# Patient Record
Sex: MALE | Race: WHITE | NOT HISPANIC OR LATINO | Employment: PART TIME | URBAN - NONMETROPOLITAN AREA
[De-identification: names, ages, dates, MRNs, and addresses within clinical notes are randomized per-mention and may not be internally consistent; named-entity substitution may affect disease eponyms.]

---

## 2021-11-28 ENCOUNTER — APPOINTMENT (OUTPATIENT)
Dept: GENERAL RADIOLOGY | Facility: HOSPITAL | Age: 41
End: 2021-11-28

## 2021-11-28 ENCOUNTER — HOSPITAL ENCOUNTER (EMERGENCY)
Facility: HOSPITAL | Age: 41
Discharge: HOME | End: 2021-11-28
Attending: EMERGENCY MEDICINE | Admitting: EMERGENCY MEDICINE

## 2021-11-28 VITALS
SYSTOLIC BLOOD PRESSURE: 134 MMHG | OXYGEN SATURATION: 97 % | TEMPERATURE: 98.1 F | HEART RATE: 78 BPM | RESPIRATION RATE: 20 BRPM | DIASTOLIC BLOOD PRESSURE: 66 MMHG | WEIGHT: 294 LBS | HEIGHT: 75 IN | BODY MASS INDEX: 36.56 KG/M2

## 2021-11-28 DIAGNOSIS — R05.9 COUGH: Primary | ICD-10-CM

## 2021-11-28 DIAGNOSIS — J40 BRONCHITIS: ICD-10-CM

## 2021-11-28 LAB
ANION GAP SERPL CALCULATED.3IONS-SCNC: 13 MMOL/L (ref 5–15)
ARTERIAL PATENCY WRIST A: POSITIVE
ATMOSPHERIC PRESS: 750 MMHG
BASE EXCESS BLDA CALC-SCNC: 2.3 MMOL/L (ref 0–2)
BASOPHILS # BLD AUTO: 0.04 10*3/MM3 (ref 0–0.2)
BASOPHILS NFR BLD AUTO: 0.3 % (ref 0–1.5)
BDY SITE: ABNORMAL
BODY TEMPERATURE: 37 C
BUN SERPL-MCNC: 17 MG/DL (ref 6–20)
BUN/CREAT SERPL: 23 (ref 7–25)
CALCIUM SPEC-SCNC: 9 MG/DL (ref 8.6–10.5)
CHLORIDE SERPL-SCNC: 105 MMOL/L (ref 98–107)
CO2 SERPL-SCNC: 23 MMOL/L (ref 22–29)
CREAT SERPL-MCNC: 0.74 MG/DL (ref 0.76–1.27)
D DIMER PPP FEU-MCNC: <0.22 MG/L (FEU) (ref 0–0.5)
DEPRECATED RDW RBC AUTO: 43.1 FL (ref 37–54)
EOSINOPHIL # BLD AUTO: 0.24 10*3/MM3 (ref 0–0.4)
EOSINOPHIL NFR BLD AUTO: 1.8 % (ref 0.3–6.2)
ERYTHROCYTE [DISTWIDTH] IN BLOOD BY AUTOMATED COUNT: 13.5 % (ref 12.3–15.4)
GFR SERPL CREATININE-BSD FRML MDRD: 117 ML/MIN/1.73
GLUCOSE SERPL-MCNC: 132 MG/DL (ref 65–99)
HCO3 BLDA-SCNC: 27 MMOL/L (ref 20–26)
HCT VFR BLD AUTO: 48.5 % (ref 37.5–51)
HGB BLD-MCNC: 16.2 G/DL (ref 13–17.7)
IMM GRANULOCYTES # BLD AUTO: 0.06 10*3/MM3 (ref 0–0.05)
IMM GRANULOCYTES NFR BLD AUTO: 0.5 % (ref 0–0.5)
LYMPHOCYTES # BLD AUTO: 3.4 10*3/MM3 (ref 0.7–3.1)
LYMPHOCYTES NFR BLD AUTO: 25.6 % (ref 19.6–45.3)
Lab: ABNORMAL
MCH RBC QN AUTO: 29.2 PG (ref 26.6–33)
MCHC RBC AUTO-ENTMCNC: 33.4 G/DL (ref 31.5–35.7)
MCV RBC AUTO: 87.5 FL (ref 79–97)
MODALITY: ABNORMAL
MONOCYTES # BLD AUTO: 1.2 10*3/MM3 (ref 0.1–0.9)
MONOCYTES NFR BLD AUTO: 9 % (ref 5–12)
NEUTROPHILS NFR BLD AUTO: 62.8 % (ref 42.7–76)
NEUTROPHILS NFR BLD AUTO: 8.33 10*3/MM3 (ref 1.7–7)
NRBC BLD AUTO-RTO: 0 /100 WBC (ref 0–0.2)
NT-PROBNP SERPL-MCNC: 22.3 PG/ML (ref 0–450)
PCO2 BLDA: 41 MM HG (ref 35–45)
PCO2 TEMP ADJ BLD: 41 MM HG (ref 35–45)
PH BLDA: 7.43 PH UNITS (ref 7.35–7.45)
PH, TEMP CORRECTED: 7.43 PH UNITS (ref 7.35–7.45)
PLATELET # BLD AUTO: 168 10*3/MM3 (ref 140–450)
PMV BLD AUTO: 13.3 FL (ref 6–12)
PO2 BLDA: 86.4 MM HG (ref 83–108)
PO2 TEMP ADJ BLD: 86.4 MM HG (ref 83–108)
POTASSIUM SERPL-SCNC: 4.2 MMOL/L (ref 3.5–5.2)
RBC # BLD AUTO: 5.54 10*6/MM3 (ref 4.14–5.8)
SAO2 % BLDCOA: 97.7 % (ref 94–99)
SARS-COV-2 RNA PNL SPEC NAA+PROBE: NOT DETECTED
SODIUM SERPL-SCNC: 141 MMOL/L (ref 136–145)
TROPONIN T SERPL-MCNC: <0.01 NG/ML (ref 0–0.03)
VENTILATOR MODE: ABNORMAL
WBC NRBC COR # BLD: 13.27 10*3/MM3 (ref 3.4–10.8)

## 2021-11-28 PROCEDURE — 93010 ELECTROCARDIOGRAM REPORT: CPT | Performed by: INTERNAL MEDICINE

## 2021-11-28 PROCEDURE — 83880 ASSAY OF NATRIURETIC PEPTIDE: CPT | Performed by: EMERGENCY MEDICINE

## 2021-11-28 PROCEDURE — 80048 BASIC METABOLIC PNL TOTAL CA: CPT | Performed by: EMERGENCY MEDICINE

## 2021-11-28 PROCEDURE — 82803 BLOOD GASES ANY COMBINATION: CPT

## 2021-11-28 PROCEDURE — 84484 ASSAY OF TROPONIN QUANT: CPT | Performed by: EMERGENCY MEDICINE

## 2021-11-28 PROCEDURE — 71045 X-RAY EXAM CHEST 1 VIEW: CPT

## 2021-11-28 PROCEDURE — 25010000002 METHYLPREDNISOLONE PER 125 MG: Performed by: EMERGENCY MEDICINE

## 2021-11-28 PROCEDURE — 94640 AIRWAY INHALATION TREATMENT: CPT

## 2021-11-28 PROCEDURE — 93005 ELECTROCARDIOGRAM TRACING: CPT | Performed by: EMERGENCY MEDICINE

## 2021-11-28 PROCEDURE — 99283 EMERGENCY DEPT VISIT LOW MDM: CPT

## 2021-11-28 PROCEDURE — 36600 WITHDRAWAL OF ARTERIAL BLOOD: CPT

## 2021-11-28 PROCEDURE — 96374 THER/PROPH/DIAG INJ IV PUSH: CPT

## 2021-11-28 PROCEDURE — 94799 UNLISTED PULMONARY SVC/PX: CPT

## 2021-11-28 PROCEDURE — 87635 SARS-COV-2 COVID-19 AMP PRB: CPT | Performed by: EMERGENCY MEDICINE

## 2021-11-28 PROCEDURE — 85025 COMPLETE CBC W/AUTO DIFF WBC: CPT | Performed by: EMERGENCY MEDICINE

## 2021-11-28 PROCEDURE — 85379 FIBRIN DEGRADATION QUANT: CPT | Performed by: EMERGENCY MEDICINE

## 2021-11-28 RX ORDER — PSEUDOEPHEDRINE HCL 120 MG/1
120 TABLET, FILM COATED, EXTENDED RELEASE ORAL EVERY 12 HOURS
Qty: 14 TABLET | Refills: 0 | Status: ON HOLD | OUTPATIENT
Start: 2021-11-28 | End: 2022-01-15

## 2021-11-28 RX ORDER — METHYLPREDNISOLONE SODIUM SUCCINATE 125 MG/2ML
125 INJECTION, POWDER, LYOPHILIZED, FOR SOLUTION INTRAMUSCULAR; INTRAVENOUS ONCE
Status: COMPLETED | OUTPATIENT
Start: 2021-11-28 | End: 2021-11-28

## 2021-11-28 RX ORDER — PREDNISONE 50 MG/1
50 TABLET ORAL DAILY
Qty: 5 TABLET | Refills: 0 | Status: ON HOLD | OUTPATIENT
Start: 2021-11-28 | End: 2022-01-15

## 2021-11-28 RX ORDER — BENZONATATE 200 MG/1
200 CAPSULE ORAL 3 TIMES DAILY PRN
Qty: 15 CAPSULE | Refills: 0 | Status: ON HOLD | OUTPATIENT
Start: 2021-11-28 | End: 2022-01-15

## 2021-11-28 RX ORDER — IPRATROPIUM BROMIDE AND ALBUTEROL SULFATE 2.5; .5 MG/3ML; MG/3ML
3 SOLUTION RESPIRATORY (INHALATION) ONCE
Status: COMPLETED | OUTPATIENT
Start: 2021-11-28 | End: 2021-11-28

## 2021-11-28 RX ORDER — ALBUTEROL SULFATE 90 UG/1
2 AEROSOL, METERED RESPIRATORY (INHALATION) EVERY 4 HOURS PRN
Qty: 6.7 G | Refills: 0 | Status: ON HOLD | OUTPATIENT
Start: 2021-11-28 | End: 2022-01-15

## 2021-11-28 RX ADMIN — IPRATROPIUM BROMIDE AND ALBUTEROL SULFATE 3 ML: 2.5; .5 SOLUTION RESPIRATORY (INHALATION) at 01:01

## 2021-11-28 RX ADMIN — METHYLPREDNISOLONE SODIUM SUCCINATE 125 MG: 125 INJECTION, POWDER, FOR SOLUTION INTRAMUSCULAR; INTRAVENOUS at 00:55

## 2021-11-29 LAB
QT INTERVAL: 344 MS
QTC INTERVAL: 416 MS

## 2022-01-14 ENCOUNTER — APPOINTMENT (OUTPATIENT)
Dept: GENERAL RADIOLOGY | Facility: HOSPITAL | Age: 42
End: 2022-01-14

## 2022-01-14 ENCOUNTER — HOSPITAL ENCOUNTER (INPATIENT)
Facility: HOSPITAL | Age: 42
LOS: 1 days | Discharge: HOME | End: 2022-01-16
Attending: INTERNAL MEDICINE

## 2022-01-14 DIAGNOSIS — R09.02 HYPOXIA: ICD-10-CM

## 2022-01-14 DIAGNOSIS — U07.1 COVID-19: Primary | ICD-10-CM

## 2022-01-14 LAB
ALBUMIN SERPL-MCNC: 4.4 G/DL (ref 3.5–5.2)
ALBUMIN/GLOB SERPL: 1.6 G/DL
ALP SERPL-CCNC: 49 U/L (ref 39–117)
ALT SERPL W P-5'-P-CCNC: 39 U/L (ref 1–41)
ANION GAP SERPL CALCULATED.3IONS-SCNC: 11 MMOL/L (ref 5–15)
ARTERIAL PATENCY WRIST A: ABNORMAL
AST SERPL-CCNC: 33 U/L (ref 1–40)
ATMOSPHERIC PRESS: 752 MMHG
BASE EXCESS BLDA CALC-SCNC: 1.9 MMOL/L (ref 0–2)
BASOPHILS # BLD AUTO: 0.03 10*3/MM3 (ref 0–0.2)
BASOPHILS NFR BLD AUTO: 0.3 % (ref 0–1.5)
BDY SITE: ABNORMAL
BILIRUB SERPL-MCNC: 0.5 MG/DL (ref 0–1.2)
BODY TEMPERATURE: 37 C
BUN SERPL-MCNC: 18 MG/DL (ref 6–20)
BUN/CREAT SERPL: 16.4 (ref 7–25)
CALCIUM SPEC-SCNC: 8.6 MG/DL (ref 8.6–10.5)
CHLORIDE SERPL-SCNC: 103 MMOL/L (ref 98–107)
CO2 SERPL-SCNC: 24 MMOL/L (ref 22–29)
CREAT SERPL-MCNC: 1.1 MG/DL (ref 0.76–1.27)
CRP SERPL-MCNC: 0.54 MG/DL (ref 0–0.5)
D DIMER PPP FEU-MCNC: 0.32 MG/L (FEU) (ref 0–0.5)
D-LACTATE SERPL-SCNC: 1.7 MMOL/L (ref 0.5–2)
DEPRECATED RDW RBC AUTO: 43 FL (ref 37–54)
EOSINOPHIL # BLD AUTO: 0.04 10*3/MM3 (ref 0–0.4)
EOSINOPHIL NFR BLD AUTO: 0.4 % (ref 0.3–6.2)
ERYTHROCYTE [DISTWIDTH] IN BLOOD BY AUTOMATED COUNT: 13.3 % (ref 12.3–15.4)
GFR SERPL CREATININE-BSD FRML MDRD: 74 ML/MIN/1.73
GLOBULIN UR ELPH-MCNC: 2.7 GM/DL
GLUCOSE SERPL-MCNC: 117 MG/DL (ref 65–99)
HCO3 BLDA-SCNC: 25.2 MMOL/L (ref 20–26)
HCT VFR BLD AUTO: 47.3 % (ref 37.5–51)
HGB BLD-MCNC: 15.6 G/DL (ref 13–17.7)
IMM GRANULOCYTES # BLD AUTO: 0.08 10*3/MM3 (ref 0–0.05)
IMM GRANULOCYTES NFR BLD AUTO: 0.7 % (ref 0–0.5)
LDH SERPL-CCNC: 273 U/L (ref 135–225)
LYMPHOCYTES # BLD AUTO: 0.32 10*3/MM3 (ref 0.7–3.1)
LYMPHOCYTES NFR BLD AUTO: 2.8 % (ref 19.6–45.3)
Lab: ABNORMAL
MCH RBC QN AUTO: 28.9 PG (ref 26.6–33)
MCHC RBC AUTO-ENTMCNC: 33 G/DL (ref 31.5–35.7)
MCV RBC AUTO: 87.8 FL (ref 79–97)
MODALITY: ABNORMAL
MONOCYTES # BLD AUTO: 1.61 10*3/MM3 (ref 0.1–0.9)
MONOCYTES NFR BLD AUTO: 14.2 % (ref 5–12)
NEUTROPHILS NFR BLD AUTO: 81.6 % (ref 42.7–76)
NEUTROPHILS NFR BLD AUTO: 9.23 10*3/MM3 (ref 1.7–7)
NRBC BLD AUTO-RTO: 0 /100 WBC (ref 0–0.2)
PCO2 BLDA: 35.1 MM HG (ref 35–45)
PCO2 TEMP ADJ BLD: 35.1 MM HG (ref 35–45)
PH BLDA: 7.46 PH UNITS (ref 7.35–7.45)
PH, TEMP CORRECTED: 7.46 PH UNITS (ref 7.35–7.45)
PLATELET # BLD AUTO: 140 10*3/MM3 (ref 140–450)
PMV BLD AUTO: 13.1 FL (ref 6–12)
PO2 BLDA: 63.3 MM HG (ref 83–108)
PO2 TEMP ADJ BLD: 63.3 MM HG (ref 83–108)
POTASSIUM SERPL-SCNC: 4.3 MMOL/L (ref 3.5–5.2)
PROCALCITONIN SERPL-MCNC: 0.14 NG/ML (ref 0–0.25)
PROT SERPL-MCNC: 7.1 G/DL (ref 6–8.5)
RBC # BLD AUTO: 5.39 10*6/MM3 (ref 4.14–5.8)
SAO2 % BLDCOA: 94.8 % (ref 94–99)
SARS-COV-2 RNA PNL SPEC NAA+PROBE: DETECTED
SODIUM SERPL-SCNC: 138 MMOL/L (ref 136–145)
VENTILATOR MODE: ABNORMAL
WBC NRBC COR # BLD: 11.31 10*3/MM3 (ref 3.4–10.8)

## 2022-01-14 PROCEDURE — 87635 SARS-COV-2 COVID-19 AMP PRB: CPT | Performed by: EMERGENCY MEDICINE

## 2022-01-14 PROCEDURE — 36600 WITHDRAWAL OF ARTERIAL BLOOD: CPT

## 2022-01-14 PROCEDURE — 86140 C-REACTIVE PROTEIN: CPT | Performed by: PHYSICIAN ASSISTANT

## 2022-01-14 PROCEDURE — 85025 COMPLETE CBC W/AUTO DIFF WBC: CPT | Performed by: PHYSICIAN ASSISTANT

## 2022-01-14 PROCEDURE — 93005 ELECTROCARDIOGRAM TRACING: CPT | Performed by: INTERNAL MEDICINE

## 2022-01-14 PROCEDURE — 84145 PROCALCITONIN (PCT): CPT | Performed by: PHYSICIAN ASSISTANT

## 2022-01-14 PROCEDURE — 25010000002 ONDANSETRON PER 1 MG: Performed by: PHYSICIAN ASSISTANT

## 2022-01-14 PROCEDURE — 85379 FIBRIN DEGRADATION QUANT: CPT | Performed by: PHYSICIAN ASSISTANT

## 2022-01-14 PROCEDURE — 83615 LACTATE (LD) (LDH) ENZYME: CPT | Performed by: PHYSICIAN ASSISTANT

## 2022-01-14 PROCEDURE — 82803 BLOOD GASES ANY COMBINATION: CPT

## 2022-01-14 PROCEDURE — 80053 COMPREHEN METABOLIC PANEL: CPT | Performed by: PHYSICIAN ASSISTANT

## 2022-01-14 PROCEDURE — 83605 ASSAY OF LACTIC ACID: CPT | Performed by: PHYSICIAN ASSISTANT

## 2022-01-14 PROCEDURE — 71045 X-RAY EXAM CHEST 1 VIEW: CPT

## 2022-01-14 PROCEDURE — 99284 EMERGENCY DEPT VISIT MOD MDM: CPT

## 2022-01-14 PROCEDURE — 25010000002 MAGNESIUM SULFATE 2 GM/50ML SOLUTION: Performed by: PHYSICIAN ASSISTANT

## 2022-01-14 PROCEDURE — 93010 ELECTROCARDIOGRAM REPORT: CPT | Performed by: INTERNAL MEDICINE

## 2022-01-14 PROCEDURE — 25010000002 DIPHENHYDRAMINE PER 50 MG: Performed by: PHYSICIAN ASSISTANT

## 2022-01-14 PROCEDURE — 93005 ELECTROCARDIOGRAM TRACING: CPT

## 2022-01-14 RX ORDER — SODIUM CHLORIDE 0.9 % (FLUSH) 0.9 %
10 SYRINGE (ML) INJECTION AS NEEDED
Status: DISCONTINUED | OUTPATIENT
Start: 2022-01-14 | End: 2022-01-16 | Stop reason: HOSPADM

## 2022-01-14 RX ORDER — MAGNESIUM SULFATE HEPTAHYDRATE 40 MG/ML
2 INJECTION, SOLUTION INTRAVENOUS ONCE
Status: COMPLETED | OUTPATIENT
Start: 2022-01-14 | End: 2022-01-15

## 2022-01-14 RX ORDER — ACETAMINOPHEN 500 MG
1000 TABLET ORAL ONCE
Status: COMPLETED | OUTPATIENT
Start: 2022-01-14 | End: 2022-01-14

## 2022-01-14 RX ORDER — DIPHENHYDRAMINE HYDROCHLORIDE 50 MG/ML
25 INJECTION INTRAMUSCULAR; INTRAVENOUS ONCE
Status: COMPLETED | OUTPATIENT
Start: 2022-01-14 | End: 2022-01-14

## 2022-01-14 RX ORDER — ONDANSETRON 2 MG/ML
4 INJECTION INTRAMUSCULAR; INTRAVENOUS ONCE
Status: COMPLETED | OUTPATIENT
Start: 2022-01-14 | End: 2022-01-14

## 2022-01-14 RX ADMIN — DIPHENHYDRAMINE HYDROCHLORIDE 25 MG: 50 INJECTION, SOLUTION INTRAMUSCULAR; INTRAVENOUS at 21:58

## 2022-01-14 RX ADMIN — MAGNESIUM SULFATE HEPTAHYDRATE 2 G: 2 INJECTION, SOLUTION INTRAVENOUS at 21:58

## 2022-01-14 RX ADMIN — ACETAMINOPHEN 1000 MG: 500 TABLET, FILM COATED ORAL at 23:12

## 2022-01-14 RX ADMIN — ONDANSETRON 4 MG: 2 INJECTION INTRAMUSCULAR; INTRAVENOUS at 21:58

## 2022-01-14 RX ADMIN — SODIUM CHLORIDE, POTASSIUM CHLORIDE, SODIUM LACTATE AND CALCIUM CHLORIDE 1000 ML: 600; 310; 30; 20 INJECTION, SOLUTION INTRAVENOUS at 21:56

## 2022-01-15 PROBLEM — E66.9 OBESITY (BMI 30-39.9): Status: ACTIVE | Noted: 2022-01-15

## 2022-01-15 PROBLEM — R09.02 HYPOXEMIA: Status: ACTIVE | Noted: 2022-01-15

## 2022-01-15 PROBLEM — F17.200 TOBACCO DEPENDENCE: Status: ACTIVE | Noted: 2022-01-15

## 2022-01-15 LAB
ALBUMIN SERPL-MCNC: 4.2 G/DL (ref 3.5–5.2)
ALBUMIN/GLOB SERPL: 1.8 G/DL
ALP SERPL-CCNC: 46 U/L (ref 39–117)
ALT SERPL W P-5'-P-CCNC: 35 U/L (ref 1–41)
ANION GAP SERPL CALCULATED.3IONS-SCNC: 8 MMOL/L (ref 5–15)
AST SERPL-CCNC: 29 U/L (ref 1–40)
BASOPHILS # BLD AUTO: 0.02 10*3/MM3 (ref 0–0.2)
BASOPHILS NFR BLD AUTO: 0.3 % (ref 0–1.5)
BILIRUB SERPL-MCNC: 0.5 MG/DL (ref 0–1.2)
BUN SERPL-MCNC: 17 MG/DL (ref 6–20)
BUN/CREAT SERPL: 17.5 (ref 7–25)
CALCIUM SPEC-SCNC: 8.3 MG/DL (ref 8.6–10.5)
CHLORIDE SERPL-SCNC: 103 MMOL/L (ref 98–107)
CO2 SERPL-SCNC: 27 MMOL/L (ref 22–29)
CREAT SERPL-MCNC: 0.97 MG/DL (ref 0.76–1.27)
DEPRECATED RDW RBC AUTO: 43.5 FL (ref 37–54)
EOSINOPHIL # BLD AUTO: 0.02 10*3/MM3 (ref 0–0.4)
EOSINOPHIL NFR BLD AUTO: 0.3 % (ref 0.3–6.2)
ERYTHROCYTE [DISTWIDTH] IN BLOOD BY AUTOMATED COUNT: 13.3 % (ref 12.3–15.4)
FERRITIN SERPL-MCNC: 199.5 NG/ML (ref 30–400)
GFR SERPL CREATININE-BSD FRML MDRD: 85 ML/MIN/1.73
GLOBULIN UR ELPH-MCNC: 2.4 GM/DL
GLUCOSE SERPL-MCNC: 99 MG/DL (ref 65–99)
HCT VFR BLD AUTO: 45.3 % (ref 37.5–51)
HGB BLD-MCNC: 15.3 G/DL (ref 13–17.7)
IMM GRANULOCYTES # BLD AUTO: 0.04 10*3/MM3 (ref 0–0.05)
IMM GRANULOCYTES NFR BLD AUTO: 0.5 % (ref 0–0.5)
LYMPHOCYTES # BLD AUTO: 0.5 10*3/MM3 (ref 0.7–3.1)
LYMPHOCYTES NFR BLD AUTO: 6.6 % (ref 19.6–45.3)
MCH RBC QN AUTO: 29.8 PG (ref 26.6–33)
MCHC RBC AUTO-ENTMCNC: 33.8 G/DL (ref 31.5–35.7)
MCV RBC AUTO: 88.3 FL (ref 79–97)
MONOCYTES # BLD AUTO: 1.45 10*3/MM3 (ref 0.1–0.9)
MONOCYTES NFR BLD AUTO: 19.1 % (ref 5–12)
NEUTROPHILS NFR BLD AUTO: 5.57 10*3/MM3 (ref 1.7–7)
NEUTROPHILS NFR BLD AUTO: 73.2 % (ref 42.7–76)
NRBC BLD AUTO-RTO: 0 /100 WBC (ref 0–0.2)
PLATELET # BLD AUTO: 125 10*3/MM3 (ref 140–450)
PMV BLD AUTO: 13.3 FL (ref 6–12)
POTASSIUM SERPL-SCNC: 4.2 MMOL/L (ref 3.5–5.2)
PROT SERPL-MCNC: 6.6 G/DL (ref 6–8.5)
RBC # BLD AUTO: 5.13 10*6/MM3 (ref 4.14–5.8)
SODIUM SERPL-SCNC: 138 MMOL/L (ref 136–145)
TROPONIN T SERPL-MCNC: <0.01 NG/ML (ref 0–0.03)
WBC NRBC COR # BLD: 7.6 10*3/MM3 (ref 3.4–10.8)

## 2022-01-15 PROCEDURE — 84484 ASSAY OF TROPONIN QUANT: CPT | Performed by: INTERNAL MEDICINE

## 2022-01-15 PROCEDURE — 25010000002 ENOXAPARIN PER 10 MG: Performed by: INTERNAL MEDICINE

## 2022-01-15 PROCEDURE — 85025 COMPLETE CBC W/AUTO DIFF WBC: CPT | Performed by: INTERNAL MEDICINE

## 2022-01-15 PROCEDURE — 82728 ASSAY OF FERRITIN: CPT | Performed by: INTERNAL MEDICINE

## 2022-01-15 PROCEDURE — 94799 UNLISTED PULMONARY SVC/PX: CPT

## 2022-01-15 PROCEDURE — 25010000002 HYDROMORPHONE PER 4 MG: Performed by: INTERNAL MEDICINE

## 2022-01-15 PROCEDURE — 63710000001 DEXAMETHASONE PER 0.25 MG: Performed by: INTERNAL MEDICINE

## 2022-01-15 PROCEDURE — 94640 AIRWAY INHALATION TREATMENT: CPT

## 2022-01-15 PROCEDURE — 80053 COMPREHEN METABOLIC PANEL: CPT | Performed by: INTERNAL MEDICINE

## 2022-01-15 RX ORDER — HYDROMORPHONE HYDROCHLORIDE 1 MG/ML
0.5 INJECTION, SOLUTION INTRAMUSCULAR; INTRAVENOUS; SUBCUTANEOUS EVERY 4 HOURS PRN
Status: DISCONTINUED | OUTPATIENT
Start: 2022-01-15 | End: 2022-01-16 | Stop reason: HOSPADM

## 2022-01-15 RX ORDER — ALBUTEROL SULFATE 90 UG/1
2 AEROSOL, METERED RESPIRATORY (INHALATION)
Status: DISCONTINUED | OUTPATIENT
Start: 2022-01-15 | End: 2022-01-16 | Stop reason: HOSPADM

## 2022-01-15 RX ORDER — ZINC SULFATE 50(220)MG
220 CAPSULE ORAL DAILY
Status: DISCONTINUED | OUTPATIENT
Start: 2022-01-15 | End: 2022-01-16 | Stop reason: HOSPADM

## 2022-01-15 RX ORDER — ASCORBIC ACID 500 MG
500 TABLET ORAL DAILY
Status: DISCONTINUED | OUTPATIENT
Start: 2022-01-15 | End: 2022-01-16 | Stop reason: HOSPADM

## 2022-01-15 RX ORDER — ACETAMINOPHEN 325 MG/1
650 TABLET ORAL EVERY 6 HOURS PRN
Status: DISCONTINUED | OUTPATIENT
Start: 2022-01-15 | End: 2022-01-15

## 2022-01-15 RX ORDER — FAMOTIDINE 20 MG/1
20 TABLET, FILM COATED ORAL
Status: DISCONTINUED | OUTPATIENT
Start: 2022-01-15 | End: 2022-01-16 | Stop reason: HOSPADM

## 2022-01-15 RX ORDER — ONDANSETRON 2 MG/ML
4 INJECTION INTRAMUSCULAR; INTRAVENOUS EVERY 6 HOURS PRN
Status: DISCONTINUED | OUTPATIENT
Start: 2022-01-15 | End: 2022-01-16 | Stop reason: HOSPADM

## 2022-01-15 RX ORDER — TRAZODONE HYDROCHLORIDE 100 MG/1
100 TABLET ORAL NIGHTLY
Status: DISCONTINUED | OUTPATIENT
Start: 2022-01-15 | End: 2022-01-16 | Stop reason: HOSPADM

## 2022-01-15 RX ORDER — FAMOTIDINE 10 MG/ML
20 INJECTION, SOLUTION INTRAVENOUS EVERY 12 HOURS SCHEDULED
Status: DISCONTINUED | OUTPATIENT
Start: 2022-01-15 | End: 2022-01-15

## 2022-01-15 RX ORDER — SODIUM CHLORIDE 0.9 % (FLUSH) 0.9 %
10 SYRINGE (ML) INJECTION EVERY 12 HOURS SCHEDULED
Status: DISCONTINUED | OUTPATIENT
Start: 2022-01-15 | End: 2022-01-16 | Stop reason: HOSPADM

## 2022-01-15 RX ORDER — ACETAMINOPHEN 325 MG/1
650 TABLET ORAL EVERY 6 HOURS PRN
Status: DISCONTINUED | OUTPATIENT
Start: 2022-01-15 | End: 2022-01-16 | Stop reason: HOSPADM

## 2022-01-15 RX ORDER — ACETAMINOPHEN 160 MG/5ML
650 SOLUTION ORAL EVERY 4 HOURS PRN
Status: DISCONTINUED | OUTPATIENT
Start: 2022-01-15 | End: 2022-01-16 | Stop reason: HOSPADM

## 2022-01-15 RX ORDER — DICLOFENAC SODIUM 75 MG/1
75 TABLET, DELAYED RELEASE ORAL 2 TIMES DAILY
COMMUNITY

## 2022-01-15 RX ORDER — TIZANIDINE 4 MG/1
4 TABLET ORAL 3 TIMES DAILY PRN
COMMUNITY
End: 2022-08-31

## 2022-01-15 RX ORDER — TRAZODONE HYDROCHLORIDE 100 MG/1
100 TABLET ORAL NIGHTLY
COMMUNITY

## 2022-01-15 RX ORDER — SODIUM CHLORIDE 0.9 % (FLUSH) 0.9 %
10 SYRINGE (ML) INJECTION AS NEEDED
Status: DISCONTINUED | OUTPATIENT
Start: 2022-01-15 | End: 2022-01-16 | Stop reason: HOSPADM

## 2022-01-15 RX ADMIN — FAMOTIDINE 20 MG: 20 TABLET, FILM COATED ORAL at 19:28

## 2022-01-15 RX ADMIN — ACETAMINOPHEN 650 MG: 325 TABLET ORAL at 06:23

## 2022-01-15 RX ADMIN — SODIUM CHLORIDE, PRESERVATIVE FREE 10 ML: 5 INJECTION INTRAVENOUS at 02:15

## 2022-01-15 RX ADMIN — THIAMINE HCL TAB 100 MG 100 MG: 100 TAB at 19:28

## 2022-01-15 RX ADMIN — SODIUM CHLORIDE, PRESERVATIVE FREE 10 ML: 5 INJECTION INTRAVENOUS at 09:51

## 2022-01-15 RX ADMIN — ACETAMINOPHEN 650 MG: 160 SOLUTION ORAL at 14:06

## 2022-01-15 RX ADMIN — ALBUTEROL SULFATE 2 PUFF: 90 AEROSOL, METERED RESPIRATORY (INHALATION) at 07:13

## 2022-01-15 RX ADMIN — ENOXAPARIN SODIUM 40 MG: 40 INJECTION SUBCUTANEOUS at 09:51

## 2022-01-15 RX ADMIN — HYDROMORPHONE HYDROCHLORIDE 0.5 MG: 1 INJECTION, SOLUTION INTRAMUSCULAR; INTRAVENOUS; SUBCUTANEOUS at 05:53

## 2022-01-15 RX ADMIN — ZINC SULFATE 220 MG (50 MG) CAPSULE 220 MG: CAPSULE at 09:52

## 2022-01-15 RX ADMIN — SODIUM CHLORIDE, PRESERVATIVE FREE 10 ML: 5 INJECTION INTRAVENOUS at 19:29

## 2022-01-15 RX ADMIN — OXYCODONE HYDROCHLORIDE AND ACETAMINOPHEN 500 MG: 500 TABLET ORAL at 09:51

## 2022-01-15 RX ADMIN — ALBUTEROL SULFATE 2 PUFF: 90 AEROSOL, METERED RESPIRATORY (INHALATION) at 20:35

## 2022-01-15 RX ADMIN — ALBUTEROL SULFATE 2 PUFF: 90 AEROSOL, METERED RESPIRATORY (INHALATION) at 15:00

## 2022-01-15 RX ADMIN — HYDROMORPHONE HYDROCHLORIDE 0.5 MG: 1 INJECTION, SOLUTION INTRAMUSCULAR; INTRAVENOUS; SUBCUTANEOUS at 19:29

## 2022-01-15 RX ADMIN — TRAZODONE HYDROCHLORIDE 100 MG: 100 TABLET ORAL at 19:28

## 2022-01-15 RX ADMIN — FAMOTIDINE 20 MG: 10 INJECTION INTRAVENOUS at 09:58

## 2022-01-15 RX ADMIN — DEXAMETHASONE 6 MG: 4 TABLET ORAL at 09:51

## 2022-01-15 NOTE — PROGRESS NOTES
"Pharmacy Dosing Service  Anticoagulant  Enoxaparin    Assessment/Action/Plan:  Lovenox 30 mg SQ every 24 hours for VTE prophylaxis has been adjusted to 40 mg SQ every 24 hours for patient with CrCl > 30 ml/min. Pharmacy will continue to monitor and adjust dose accordingly.       Subjective:  Miguel Willis is a 41 y.o. male on Enoxaparin 40 mg SQ every 24 hours for indication of VTE prophylaxis.  Objective:  [Ht: 193 cm (76\"); Wt: (!) 139 kg (306 lb); BMI: Body mass index is 37.25 kg/m².]  Estimated Creatinine Clearance: 135 mL/min (by C-G formula based on SCr of 1.1 mg/dL).   Lab Results   Component Value Date    DDIMER 0.32 01/14/2022    No results found for: INR, PROTIME   Lab Results   Component Value Date    HGB 15.6 01/14/2022      Lab Results   Component Value Date     01/14/2022       Aman Akins, PharmD  01/15/22 01:53 CST     "

## 2022-01-15 NOTE — PLAN OF CARE
Problem: Adult Inpatient Plan of Care  Goal: Plan of Care Review  Outcome: Ongoing, Progressing  Flowsheets (Taken 1/15/2022 0644)  Progress: no change  Plan of Care Reviewed With: patient  Outcome Summary: A&Ox4. Temp of 100.9 this am, Tylenol given. Tachypneic. Tachycardic. MD notified. C/o severe headache pain, prn dilaudid given with some relief. No c/o SOB. Covid precautions initiated. Pt expresses concern over how long he will be here. 2L NC, sats running low to mid 90s. Tachy on tele. Call light in reach. Safety maintained.

## 2022-01-15 NOTE — ED PROVIDER NOTES
Subjective   History of Present Illness    Patient is an otherwise healthy 41-year-old male presenting to ED with concern for COVID.  Patient states that at 11 AM he had a sudden onset of a severe generalized headache, severe body aching, as well as generalized weakness and a nonproductive cough.  Patient reports throughout the day has had subjective fevers where he will break out in a sweat and be followed by chills.  Patient reports he has tried no medications and his symptoms have continued to worsen which time he presents for further evaluation.  Patient states that 2 weeks ago his stepdaughter did have COVID-19 however he denies any other known recent sick contact.  Patient does work with the public.  Patient describes some nausea but denies any emesis, abdominal pain, or diarrhea.    Patient received his COVID vaccinations but not a booster.    Records reviewed show patient last seen in the ED on 11/28/2021 for cough and bronchitis.    Review of Systems   Constitutional: Positive for chills, diaphoresis and fever (Subjective).   HENT: Positive for congestion. Negative for sore throat.    Eyes: Negative.    Respiratory: Positive for cough. Negative for chest tightness and shortness of breath.    Cardiovascular: Negative.    Gastrointestinal: Positive for nausea. Negative for abdominal pain, diarrhea and vomiting.   Genitourinary: Negative.    Musculoskeletal: Positive for myalgias.   Skin: Negative.    Neurological: Positive for headaches.   Psychiatric/Behavioral: Negative.    All other systems reviewed and are negative.      History reviewed. No pertinent past medical history.    No Known Allergies    Past Surgical History:   Procedure Laterality Date   • BACK SURGERY         History reviewed. No pertinent family history.    Social History     Socioeconomic History   • Marital status: Legally    Tobacco Use   • Smoking status: Current Every Day Smoker     Packs/day: 1.00     Types: Cigarettes   •  Smokeless tobacco: Never Used           Objective   Physical Exam  Vitals and nursing note reviewed.   Constitutional:       General: He is in acute distress (appears uncomfortable).      Appearance: Normal appearance. He is well-developed, well-groomed and overweight. He is ill-appearing. He is not toxic-appearing or diaphoretic.   HENT:      Head: Normocephalic.      Mouth/Throat:      Mouth: Mucous membranes are moist.      Pharynx: Oropharynx is clear. Posterior oropharyngeal erythema present.   Eyes:      Extraocular Movements: Extraocular movements intact.      Conjunctiva/sclera: Conjunctivae normal.      Pupils: Pupils are equal, round, and reactive to light.   Cardiovascular:      Rate and Rhythm: Tachycardia present.   Pulmonary:      Effort: Pulmonary effort is normal.      Breath sounds: Normal breath sounds.   Abdominal:      General: Bowel sounds are normal.      Palpations: Abdomen is soft.      Tenderness: There is no abdominal tenderness.   Musculoskeletal:         General: Normal range of motion.      Cervical back: Normal range of motion and neck supple.      Right lower leg: No edema.      Left lower leg: No edema.   Skin:     General: Skin is warm and dry.      Findings: No rash.   Neurological:      Mental Status: He is alert and oriented to person, place, and time.      Gait: Gait normal.   Psychiatric:         Mood and Affect: Mood normal.         Behavior: Behavior normal. Behavior is cooperative.         Procedures           ED Course                                                 MDM  Number of Diagnoses or Management Options     Amount and/or Complexity of Data Reviewed  Clinical lab tests: ordered and reviewed  Tests in the radiology section of CPT®: reviewed and ordered  Tests in the medicine section of CPT®: reviewed and ordered  Decide to obtain previous medical records or to obtain history from someone other than the patient: yes  Review and summarize past medical records:  yes  Discuss the patient with other providers: yes (Dr. Aj De Jesus (attending)  Dr. Landrum (hospitalist))    Patient Progress  Patient progress: stable      Patient is an otherwise healthy 41-year-old male presenting to ED with concern for COVID.  CBC with leukocytosis 11.31, elevated relative neutrophils 81.6%, decreased lymphocytes 2.8%.  CMP unremarkable.  Lactic acid, procalcitonin WNL.  LDH elevated at 273, CRP elevated at 0.54.  D-dimer within normal limits at 0.32.  ABG with alkalotic pH 7.465 and decreased PO2 of 63.3.  Patient is COVID-positive.  Patient's initial hypertensive state and tachycardia were improved with a fluid bolus.  Patient was given Tylenol, Benadryl, Zofran, magnesium and had improvement of his headache.  Patient became tachypneic and began satting at 84% on room air for which she was started on 3 to 4 L of nasal cannula oxygen.  Discussed with patient need for admission for further evaluation and monitoring of his symptoms for which he is amenable with no further questions, concerns, or needs.  Case was discussed with Dr. Figueroa, hospitalist, who will kindly accept patient for admission under her services at this time with no further recommendations.    Final diagnoses:   COVID-19   Hypoxia       ED Disposition  ED Disposition     ED Disposition Condition Comment    Decision to Admit  Level of Care: Med/Surg [1]   Diagnosis: COVID-19 [[id number]]   Admitting Physician: JAZZMINE LANDRUM [[id number]]            No follow-up provider specified.       Medication List      No changes were made to your prescriptions during this visit.          Alberto Dalal PA-C  01/15/22 0009

## 2022-01-15 NOTE — PLAN OF CARE
Goal Outcome Evaluation:  Plan of Care Reviewed With: patient      Patient is alert and oriented x4. VSS, O2 @ 2/m. Infrequent pain. Mild cough. Up ad jose. Tele continue. . Continent Bowel and bladder. YO. Continue to monitor.

## 2022-01-15 NOTE — PROGRESS NOTES
[facility] Medicine Services  INPATIENT PROGRESS NOTE    Patient Name: Miguel Willis  Date of Admission: 1/14/2022  Today's Date: 01/15/22  Length of Stay: 0  Primary Care Physician: Provider, No Known    Subjective   Chief Complaint: follow-up COVID  HPI   Patient states that he is doing okay.  He reports that he began having symptoms on Wednesday night (1/12).  He reports that he got the M[employer] vaccine a number of months ago.  He does smoke about a pack of cigarettes per day.  He reports having aches and pains, particularly in his hips.  His headache is getting better.  He has not noticed any significant dyspnea with exertion today, and states that he can get up and go to the bathroom without any shortness of breath.  No significant cough.  He has had ongoing fever and flulike symptoms.  He states that his primary doctor has arranged for him to get a sleep study on an outpatient basis here in the near future for suspected sleep apnea.    Review of Systems     All pertinent negatives and positives are as above. All other systems have been reviewed and are negative unless otherwise stated.     Objective    Temp:  [99.2 °F (37.3 °C)-100.9 °F (38.3 °C)] 100.7 °F (38.2 °C)  Heart Rate:  [] 91  Resp:  [19-30] 21  BP: (109-154)/() 146/83  Physical Exam  Vitals reviewed.   Constitutional:       Appearance: He is not toxic-appearing.   HENT:      Head: Normocephalic.      Mouth/Throat:      Pharynx: No oropharyngeal exudate.   Eyes:      Pupils: Pupils are equal, round, and reactive to light.   Cardiovascular:      Rate and Rhythm: Normal rate.   Pulmonary:      Effort: Pulmonary effort is normal.      Breath sounds: Normal breath sounds.      Comments: Currently on 2L; I turned him down to 1LNC during my assessment  Abdominal:      Palpations: Abdomen is soft.   Musculoskeletal:         General: No deformity.      Cervical back: Neck supple.   Skin:     General:  Skin is warm.      Capillary Refill: Capillary refill takes less than 2 seconds.   Neurological:      General: No focal deficit present.      Mental Status: He is alert.   Psychiatric:         Mood and Affect: Mood normal.         Results Review:  I have reviewed the labs, radiology results, and diagnostic studies.    Laboratory Data:   Results from last 7 days   Lab Units 01/15/22  0441 01/14/22  2155   WBC 10*3/mm3 7.60 11.31*   HEMOGLOBIN g/dL 15.3 15.6   HEMATOCRIT % 45.3 47.3   PLATELETS 10*3/mm3 125* 140        Results from last 7 days   Lab Units 01/15/22  0441 01/14/22  2155   SODIUM mmol/L 138 138   POTASSIUM mmol/L 4.2 4.3   CHLORIDE mmol/L 103 103   CO2 mmol/L 27.0 24.0   BUN mg/dL 17 18   CREATININE mg/dL 0.97 1.10   CALCIUM mg/dL 8.3* 8.6   BILIRUBIN mg/dL 0.5 0.5   ALK PHOS U/L 46 49   ALT (SGPT) U/L 35 39   AST (SGOT) U/L 29 33   GLUCOSE mg/dL 99 117*       Culture Data:   No results found for: BLOODCX, URINECX, WOUNDCX, MRSACX, RESPCX, STOOLCX    Radiology Data:   Imaging Results (Last 24 Hours)     Procedure Component Value Units Date/Time    XR Chest AP [[id number]] Collected: 01/15/22 0741     Updated: 01/15/22 0748    Narrative:      EXAMINATION: Chest 1 view 1/14/2022     HISTORY: Cough and fever. Covid evaluation.     FINDINGS: Upright frontal projection of the chest is compared to prior  exam 11/28/2021. Lungs are hypoventilated causing accentuation of the  bronchovascular markings and cardiac silhouette. There is a granuloma  within the left lung apex. There is no evidence of lobar pneumonia or  effusion.       Impression:      1.. Expiratory chest. No acute disease.  This report was finalized on 01/15/2022 07:45 by Dr. Jovan Braden MD.          I have reviewed the patient's current medications.     Assessment/Plan     Active Hospital Problems    Diagnosis    • Hypoxemia    • Obesity (BMI 30-39.9)    • Tobacco dependence    • COVID-19      Plan:  1.  Continue treatment with  dexamethasone  2.  I turned patient down from 2 L by nasal cannula to 1 L by nasal cannula during my assessment, and he maintained O2 saturations between 94-98%.  We will see if we can continue to wean further to room air.  3.  Add incentive spirometry  4.  We discussed remdesivir; plan to continue to monitor for any evolving symptoms before instituting this treatment at this time.  I did tell patient that if his symptoms began only a few days ago that the earlier we begin treatment the better.  5.  Albuterol HFA  6.  Vitamin C, Pepcid, zinc sulfate, and thiamine  7.  Lovenox prophylaxis  8.  CRP, procalcitonin, ferritin are currently stable.  Other labs reviewed and also stable.  Chest x-ray reviewed with no acute infiltrates.  9.  Patient states that he received the Mata [employer] Mata vaccine a number of months ago.        Electronically signed by Deshaun Paige MD, 01/15/22, 18:02 CST.

## 2022-01-15 NOTE — ED NOTES
Patient desating to 85% on room air, patient placed on 4L nasal cannula. Alberto henson notified.     Cyndi Bunch RN  01/14/22 7347

## 2022-01-15 NOTE — H&P
[facility] Medicine Services  HISTORY AND PHYSICAL    Date of Admission: 1/14/2022  Primary Care Physician: Provider, No Known    Subjective     Chief Complaint: Headache    History of Present Illness  41-year-old male who presents to the emergency department for headache and cough.  The patient's symptoms developed today.  He complains of sore throat, pounding type of migraine that is diffuse, bilateral ear pain, body aches, and cough.  His cough has been nonproductive.  He has no diarrhea.  He has no abdominal pain.  He has no chest pain.  He did not get COVID vaccinations.        Review of Systems   Headache  ST  Ear pain  Cough    Otherwise complete ROS reviewed and negative except as mentioned in the HPI.    Past Medical History: History reviewed. No pertinent past medical history.  Past Surgical History:  Past Surgical History:   Procedure Laterality Date   • BACK SURGERY       Social History:  reports that he has been smoking cigarettes. He has been smoking about 1.00 pack per day. He has never used smokeless tobacco.    Family History: family history is not on file.      Allergies:  No Known Allergies    Medications:  Prior to Admission medications    Medication Sig Start Date End Date Taking? Authorizing Provider   albuterol sulfate  (90 Base) MCG/ACT inhaler Inhale 2 puffs Every 4 (Four) Hours As Needed for Wheezing or Shortness of Air. 11/28/21   Louis Ramirez MD   benzonatate (TESSALON) 200 MG capsule Take 1 capsule by mouth 3 (Three) Times a Day As Needed for Cough. 11/28/21   Louis Ramirez MD   predniSONE (DELTASONE) 50 MG tablet Take 1 tablet by mouth Daily. 11/28/21   Louis Ramirez MD   pseudoephedrine (Sudafed 12 Hour) 120 MG 12 hr tablet Take 1 tablet by mouth Every 12 (Twelve) Hours. 11/28/21   Louis Ramirez MD     I have utilized all available immediate resources to obtain, update, and review the patient's current  "medications.    Objective     Vital Signs: /92   Pulse 109   Temp 100.1 °F (37.8 °C) (Oral)   Resp (!) 30   Ht 193 cm (76\")   Wt (!) 139 kg (306 lb)   SpO2 95%   BMI 37.25 kg/m²   Physical Exam  Vitals reviewed.   Constitutional:       Appearance: He is ill-appearing.   HENT:      Head: Normocephalic and atraumatic.      Nose: Nose normal.   Eyes:      General: No scleral icterus.     Conjunctiva/sclera: Conjunctivae normal.   Cardiovascular:      Rate and Rhythm: Regular rhythm. Tachycardia present.      Heart sounds: Normal heart sounds.   Pulmonary:      Effort: Pulmonary effort is normal. No respiratory distress.   Abdominal:      General: There is no distension.      Palpations: Abdomen is soft.   Musculoskeletal:         General: No swelling or tenderness.      Cervical back: Normal range of motion and neck supple.   Skin:     General: Skin is warm and dry.   Neurological:      General: No focal deficit present.      Mental Status: He is alert.      Cranial Nerves: No cranial nerve deficit.   Psychiatric:         Mood and Affect: Mood normal.         Behavior: Behavior normal.        Results Reviewed:  Lab Results (last 24 hours)     Procedure Component Value Units Date/Time    Comprehensive Metabolic Panel [[id number]]  (Abnormal) Collected: 01/14/22 2155    Specimen: Blood Updated: 01/14/22 2254     Glucose 117 mg/dL      BUN 18 mg/dL      Creatinine 1.10 mg/dL      Sodium 138 mmol/L      Potassium 4.3 mmol/L      Chloride 103 mmol/L      CO2 24.0 mmol/L      Calcium 8.6 mg/dL      Total Protein 7.1 g/dL      Albumin 4.40 g/dL      ALT (SGPT) 39 U/L      AST (SGOT) 33 U/L      Alkaline Phosphatase 49 U/L      Total Bilirubin 0.5 mg/dL      eGFR Non African Amer 74 mL/min/1.73      Globulin 2.7 gm/dL      A/G Ratio 1.6 g/dL      BUN/Creatinine Ratio 16.4     Anion Gap 11.0 mmol/L     Narrative:      GFR Normal >60  Chronic Kidney Disease <60  Kidney Failure <15      C-reactive Protein [[id number]]  " "(Abnormal) Collected: 01/14/22 2155    Specimen: Blood Updated: 01/14/22 2253     C-Reactive Protein 0.54 mg/dL     Lactate Dehydrogenase [[id number]]  (Abnormal) Collected: 01/14/22 2155    Specimen: Blood Updated: 01/14/22 2252      U/L      Comment: Specimen hemolyzed.  Results may be affected.       Procalcitonin [[id number]]  (Normal) Collected: 01/14/22 2155    Specimen: Blood Updated: 01/14/22 2241     Procalcitonin 0.14 ng/mL     Narrative:      As a Marker for Sepsis (Non-Neonates):     1. <0.5 ng/mL represents a low risk of severe sepsis and/or septic shock.  2. >2 ng/mL represents a high risk of severe sepsis and/or septic shock.    As a Marker for Lower Respiratory Tract Infections that require antibiotic therapy:  PCT on Admission     Antibiotic Therapy             6-12 Hrs later  >0.5                          Strongly Recommended            >0.25 - <0.5             Recommended  0.1 - 0.25                  Discouraged                       Remeasure/reassess PCT  <0.1                         Strongly Discouraged         Remeasure/reassess PCT      As 28 day mortality risk marker: \"Change in Procalcitonin Result\" (>80% or <=80%) if Day 0 (or Day 1) and Day 4 values are available. Refer to http://www.Ezetaps-pct-calculator.com/    Change in PCT <=80 %   A decrease of PCT levels below or equal to 80% defines a positive change in PCT test result representing a higher risk for 28-day all-cause mortality of patients diagnosed with severe sepsis or septic shock.    Change in PCT >80 %   A decrease of PCT levels of more than 80% defines a negative change in PCT result representing a lower risk for 28-day all-cause mortality of patients diagnosed with severe sepsis or septic shock.                Lactic Acid, Plasma [[id number]]  (Normal) Collected: 01/14/22 2155    Specimen: Blood Updated: 01/14/22 2233     Lactate 1.7 mmol/L     D-dimer, Quantitative [[id number]]  (Normal) Collected: 01/14/22 2155    " Specimen: Blood Updated: 01/14/22 2225     D-Dimer, Quantitative 0.32 mg/L (FEU)     Narrative:      Reference Range is 0-0.50 mg/L FEU. However, results <0.50 mg/L FEU tends to rule out DVT or PE. Results >0.50 mg/L FEU are not useful in predicting absence or presence of DVT or PE.      CBC & Differential [[id number]]  (Abnormal) Collected: 01/14/22 2155    Specimen: Blood Updated: 01/14/22 2220    Narrative:      The following orders were created for panel order CBC & Differential.  Procedure                               Abnormality         Status                     ---------                               -----------         ------                     CBC Auto Differential[[id number]]        Abnormal            Final result                 Please view results for these tests on the individual orders.    CBC Auto Differential [[id number]]  (Abnormal) Collected: 01/14/22 2155    Specimen: Blood Updated: 01/14/22 2220     WBC 11.31 10*3/mm3      RBC 5.39 10*6/mm3      Hemoglobin 15.6 g/dL      Hematocrit 47.3 %      MCV 87.8 fL      MCH 28.9 pg      MCHC 33.0 g/dL      RDW 13.3 %      RDW-SD 43.0 fl      MPV 13.1 fL      Platelets 140 10*3/mm3      Neutrophil % 81.6 %      Lymphocyte % 2.8 %      Monocyte % 14.2 %      Eosinophil % 0.4 %      Basophil % 0.3 %      Immature Grans % 0.7 %      Neutrophils, Absolute 9.23 10*3/mm3      Lymphocytes, Absolute 0.32 10*3/mm3      Monocytes, Absolute 1.61 10*3/mm3      Eosinophils, Absolute 0.04 10*3/mm3      Basophils, Absolute 0.03 10*3/mm3      Immature Grans, Absolute 0.08 10*3/mm3      nRBC 0.0 /100 WBC     Blood Gas, Arterial - [[id number]]  (Abnormal) Collected: 01/14/22 2150    Specimen: Arterial Blood Updated: 01/14/22 2158     Site Right Brachial     Carlos's Test N/A     pH, Arterial 7.465 pH units      Comment: 83 Value above reference range        pCO2, Arterial 35.1 mm Hg      pO2, Arterial 63.3 mm Hg      Comment: 84 Value below reference range        HCO3,  Arterial 25.2 mmol/L      Base Excess, Arterial 1.9 mmol/L      O2 Saturation, Arterial 94.8 %      Temperature 37.0 C      Barometric Pressure for Blood Gas 752 mmHg      Modality Room Air     Ventilator Mode NA     Collected by [id number]     Comment: Meter: [id number]     :  [email or web address]        pCO2, Temperature Corrected 35.1 mm Hg      pH, Temp Corrected 7.465 pH Units      pO2, Temperature Corrected 63.3 mm Hg     COVID PRE-OP / PRE-PROCEDURE SCREENING ORDER (NO ISOLATION) - Swab, Nasal Cavity [[id number]]  (Abnormal) Collected: 01/14/22 1824    Specimen: Swab from Nasal Cavity Updated: 01/14/22 1924    Narrative:      The following orders were created for panel order COVID PRE-OP / PRE-PROCEDURE SCREENING ORDER (NO ISOLATION) - Swab, Nasal Cavity.  Procedure                               Abnormality         Status                     ---------                               -----------         ------                     COVID-19,[facility]...[[id number]]  Abnormal            Final result                 Please view results for these tests on the individual orders.    COVID-19,[facility] IN-HOUSE,Nasal Swab No Transport Media 3-4 HR TAT - Swab, Nasal Cavity [[id number]]  (Abnormal) Collected: 01/14/22 1824    Specimen: Swab from Nasal Cavity Updated: 01/14/22 1924     COVID19 Detected    Narrative:      Fact sheet for providers: https://www.fda.gov/media/738437/download     Fact sheet for patients: https://www.fda.gov/media/054215/download    Test performed by PCR.    Consider negative results in combination with clinical observations, patient history, and epidemiological information.        Imaging Results (Last 24 Hours)     Procedure Component Value Units Date/Time    XR Chest AP [[id number]] Resulted: 01/14/22 2201     Updated: 01/14/22 2202        I have personally reviewed and interpreted the radiology studies and ECG obtained at time of admission.     Assessment / Plan     Assessment:   Active  Hospital Problems    Diagnosis    • COVID-19      Impression:  1. COVID-19  2. Hypoxia  3. Headache   4. Mild hyperglycemia    Plan:   1. 02 support  2. Zinc and Vitamin C  3. Pepcid and Decadron  4. Labs in the am  5. PRN albuterol MDI  6. Dilaudid for HA PRN       Code Status/Advanced Care Plan: Full full    The patient's surrogate decision maker is family.     I discussed my findings and recommendations with the patient.    Estimated length of stay is 1 to 2 days.     The patient was seen and examined by me on 1/14/2022 at seen before midnight.    Electronically signed by Jacqueline Landrum DO, 01/15/22, 00:16 CST.

## 2022-01-16 ENCOUNTER — READMISSION MANAGEMENT (OUTPATIENT)
Dept: CALL CENTER | Facility: HOSPITAL | Age: 42
End: 2022-01-16

## 2022-01-16 VITALS
TEMPERATURE: 97.2 F | SYSTOLIC BLOOD PRESSURE: 136 MMHG | OXYGEN SATURATION: 94 % | HEIGHT: 75 IN | BODY MASS INDEX: 38.07 KG/M2 | DIASTOLIC BLOOD PRESSURE: 76 MMHG | WEIGHT: 306.2 LBS | RESPIRATION RATE: 16 BRPM | HEART RATE: 72 BPM

## 2022-01-16 PROCEDURE — 25010000002 HYDROMORPHONE PER 4 MG: Performed by: INTERNAL MEDICINE

## 2022-01-16 PROCEDURE — 94799 UNLISTED PULMONARY SVC/PX: CPT

## 2022-01-16 PROCEDURE — 63710000001 DEXAMETHASONE PER 0.25 MG: Performed by: INTERNAL MEDICINE

## 2022-01-16 PROCEDURE — 25010000002 ENOXAPARIN PER 10 MG: Performed by: INTERNAL MEDICINE

## 2022-01-16 PROCEDURE — 94664 DEMO&/EVAL PT USE INHALER: CPT

## 2022-01-16 RX ORDER — ZINC SULFATE 50(220)MG
220 CAPSULE ORAL DAILY
Qty: 15 CAPSULE | Refills: 0 | Status: SHIPPED | OUTPATIENT
Start: 2022-01-17 | End: 2022-08-31

## 2022-01-16 RX ORDER — ALBUTEROL SULFATE 90 UG/1
2 AEROSOL, METERED RESPIRATORY (INHALATION)
Qty: 6.7 G | Refills: 1 | Status: SHIPPED | OUTPATIENT
Start: 2022-01-16 | End: 2022-08-31

## 2022-01-16 RX ORDER — FAMOTIDINE 20 MG/1
20 TABLET, FILM COATED ORAL
Qty: 14 TABLET | Refills: 0 | Status: SHIPPED | OUTPATIENT
Start: 2022-01-16 | End: 2022-01-23

## 2022-01-16 RX ORDER — DEXAMETHASONE 6 MG/1
6 TABLET ORAL
Qty: 7 TABLET | Refills: 0 | Status: SHIPPED | OUTPATIENT
Start: 2022-01-17 | End: 2022-01-24

## 2022-01-16 RX ORDER — ASCORBIC ACID 500 MG
500 TABLET ORAL DAILY
Qty: 15 TABLET | Refills: 0 | Status: SHIPPED | OUTPATIENT
Start: 2022-01-17 | End: 2022-08-31

## 2022-01-16 RX ADMIN — FAMOTIDINE 20 MG: 20 TABLET, FILM COATED ORAL at 17:12

## 2022-01-16 RX ADMIN — ALBUTEROL SULFATE 2 PUFF: 90 AEROSOL, METERED RESPIRATORY (INHALATION) at 11:06

## 2022-01-16 RX ADMIN — THIAMINE HCL TAB 100 MG 100 MG: 100 TAB at 09:56

## 2022-01-16 RX ADMIN — SODIUM CHLORIDE, PRESERVATIVE FREE 10 ML: 5 INJECTION INTRAVENOUS at 09:55

## 2022-01-16 RX ADMIN — OXYCODONE HYDROCHLORIDE AND ACETAMINOPHEN 500 MG: 500 TABLET ORAL at 09:55

## 2022-01-16 RX ADMIN — ALBUTEROL SULFATE 2 PUFF: 90 AEROSOL, METERED RESPIRATORY (INHALATION) at 06:58

## 2022-01-16 RX ADMIN — ENOXAPARIN SODIUM 40 MG: 40 INJECTION SUBCUTANEOUS at 09:55

## 2022-01-16 RX ADMIN — HYDROMORPHONE HYDROCHLORIDE 0.5 MG: 1 INJECTION, SOLUTION INTRAMUSCULAR; INTRAVENOUS; SUBCUTANEOUS at 10:05

## 2022-01-16 RX ADMIN — DEXAMETHASONE 6 MG: 4 TABLET ORAL at 09:56

## 2022-01-16 RX ADMIN — ALBUTEROL SULFATE 2 PUFF: 90 AEROSOL, METERED RESPIRATORY (INHALATION) at 14:42

## 2022-01-16 RX ADMIN — ZINC SULFATE 220 MG (50 MG) CAPSULE 220 MG: CAPSULE at 09:56

## 2022-01-16 RX ADMIN — FAMOTIDINE 20 MG: 20 TABLET, FILM COATED ORAL at 09:55

## 2022-01-16 NOTE — DISCHARGE SUMMARY
[facility] Medicine Services  DISCHARGE SUMMARY       Date of Admission: 1/14/2022  Date of Discharge:  1/16/2022  Primary Care Physician: Provider, No Known    Presenting Problem/History of Present Illness:  COVID-19 [U07.1]     Final Discharge Diagnoses:  Active Hospital Problems    Diagnosis    • Hypoxemia    • Obesity (BMI 30-39.9)    • Tobacco dependence    • COVID-19        Procedures Performed:   Imaging Results (Last 7 Days)     Procedure Component Value Units Date/Time    XR Chest AP [[id number]] Collected: 01/15/22 0741     Updated: 01/15/22 0748    Narrative:      EXAMINATION: Chest 1 view 1/14/2022     HISTORY: Cough and fever. Covid evaluation.     FINDINGS: Upright frontal projection of the chest is compared to prior  exam 11/28/2021. Lungs are hypoventilated causing accentuation of the  bronchovascular markings and cardiac silhouette. There is a granuloma  within the left lung apex. There is no evidence of lobar pneumonia or  effusion.       Impression:      1.. Expiratory chest. No acute disease.  This report was finalized on 01/15/2022 07:45 by Dr. Jovan Braden MD.          Pertinent Test Results:   Lab Results (last 72 hours)     Procedure Component Value Units Date/Time    Comprehensive Metabolic Panel [[id number]]  (Abnormal) Collected: 01/15/22 0441    Specimen: Blood Updated: 01/15/22 0541     Glucose 99 mg/dL      BUN 17 mg/dL      Creatinine 0.97 mg/dL      Sodium 138 mmol/L      Potassium 4.2 mmol/L      Chloride 103 mmol/L      CO2 27.0 mmol/L      Calcium 8.3 mg/dL      Total Protein 6.6 g/dL      Albumin 4.20 g/dL      ALT (SGPT) 35 U/L      AST (SGOT) 29 U/L      Alkaline Phosphatase 46 U/L      Total Bilirubin 0.5 mg/dL      eGFR Non African Amer 85 mL/min/1.73      Globulin 2.4 gm/dL      A/G Ratio 1.8 g/dL      BUN/Creatinine Ratio 17.5     Anion Gap 8.0 mmol/L     Narrative:      GFR Normal >60  Chronic Kidney Disease <60  Kidney Failure <15       Troponin [[id number]]  (Normal) Collected: 01/15/22 0441    Specimen: Blood Updated: 01/15/22 0541     Troponin T <0.010 ng/mL     Narrative:      Troponin T Reference Range:  <= 0.03 ng/mL-   Negative for AMI  >0.03 ng/mL-     Abnormal for myocardial necrosis.  Clinicians would have to utilize clinical acumen, EKG, Troponin and serial changes to determine if it is an Acute Myocardial Infarction or myocardial injury due to an underlying chronic condition.       Results may be falsely decreased if patient taking Biotin.      Ferritin [[id number]]  (Normal) Collected: 01/15/22 0441    Specimen: Blood Updated: 01/15/22 0541     Ferritin 199.50 ng/mL     Narrative:      Results may be falsely decreased if patient taking Biotin.      CBC Auto Differential [[id number]]  (Abnormal) Collected: 01/15/22 0441    Specimen: Blood Updated: 01/15/22 0519     WBC 7.60 10*3/mm3      RBC 5.13 10*6/mm3      Hemoglobin 15.3 g/dL      Hematocrit 45.3 %      MCV 88.3 fL      MCH 29.8 pg      MCHC 33.8 g/dL      RDW 13.3 %      RDW-SD 43.5 fl      MPV 13.3 fL      Platelets 125 10*3/mm3      Neutrophil % 73.2 %      Lymphocyte % 6.6 %      Monocyte % 19.1 %      Eosinophil % 0.3 %      Basophil % 0.3 %      Immature Grans % 0.5 %      Neutrophils, Absolute 5.57 10*3/mm3      Lymphocytes, Absolute 0.50 10*3/mm3      Monocytes, Absolute 1.45 10*3/mm3      Eosinophils, Absolute 0.02 10*3/mm3      Basophils, Absolute 0.02 10*3/mm3      Immature Grans, Absolute 0.04 10*3/mm3      nRBC 0.0 /100 WBC     Comprehensive Metabolic Panel [[id number]]  (Abnormal) Collected: 01/14/22 2155    Specimen: Blood Updated: 01/14/22 2254     Glucose 117 mg/dL      BUN 18 mg/dL      Creatinine 1.10 mg/dL      Sodium 138 mmol/L      Potassium 4.3 mmol/L      Chloride 103 mmol/L      CO2 24.0 mmol/L      Calcium 8.6 mg/dL      Total Protein 7.1 g/dL      Albumin 4.40 g/dL      ALT (SGPT) 39 U/L      AST (SGOT) 33 U/L      Alkaline Phosphatase 49 U/L      Total  "Bilirubin 0.5 mg/dL      eGFR Non African Amer 74 mL/min/1.73      Globulin 2.7 gm/dL      A/G Ratio 1.6 g/dL      BUN/Creatinine Ratio 16.4     Anion Gap 11.0 mmol/L     Narrative:      GFR Normal >60  Chronic Kidney Disease <60  Kidney Failure <15      C-reactive Protein [[id number]]  (Abnormal) Collected: 01/14/22 2155    Specimen: Blood Updated: 01/14/22 2253     C-Reactive Protein 0.54 mg/dL     Lactate Dehydrogenase [[id number]]  (Abnormal) Collected: 01/14/22 2155    Specimen: Blood Updated: 01/14/22 2252      U/L      Comment: Specimen hemolyzed.  Results may be affected.       Procalcitonin [[id number]]  (Normal) Collected: 01/14/22 2155    Specimen: Blood Updated: 01/14/22 2241     Procalcitonin 0.14 ng/mL     Narrative:      As a Marker for Sepsis (Non-Neonates):     1. <0.5 ng/mL represents a low risk of severe sepsis and/or septic shock.  2. >2 ng/mL represents a high risk of severe sepsis and/or septic shock.    As a Marker for Lower Respiratory Tract Infections that require antibiotic therapy:  PCT on Admission     Antibiotic Therapy             6-12 Hrs later  >0.5                          Strongly Recommended            >0.25 - <0.5             Recommended  0.1 - 0.25                  Discouraged                       Remeasure/reassess PCT  <0.1                         Strongly Discouraged         Remeasure/reassess PCT      As 28 day mortality risk marker: \"Change in Procalcitonin Result\" (>80% or <=80%) if Day 0 (or Day 1) and Day 4 values are available. Refer to http://www.[employer]-pct-calculator.com/    Change in PCT <=80 %   A decrease of PCT levels below or equal to 80% defines a positive change in PCT test result representing a higher risk for 28-day all-cause mortality of patients diagnosed with severe sepsis or septic shock.    Change in PCT >80 %   A decrease of PCT levels of more than 80% defines a negative change in PCT result representing a lower risk for 28-day all-cause mortality " of patients diagnosed with severe sepsis or septic shock.                Lactic Acid, Plasma [[id number]]  (Normal) Collected: 01/14/22 2155    Specimen: Blood Updated: 01/14/22 2233     Lactate 1.7 mmol/L     D-dimer, Quantitative [[id number]]  (Normal) Collected: 01/14/22 2155    Specimen: Blood Updated: 01/14/22 2225     D-Dimer, Quantitative 0.32 mg/L (FEU)     Narrative:      Reference Range is 0-0.50 mg/L FEU. However, results <0.50 mg/L FEU tends to rule out DVT or PE. Results >0.50 mg/L FEU are not useful in predicting absence or presence of DVT or PE.      CBC & Differential [[id number]]  (Abnormal) Collected: 01/14/22 2155    Specimen: Blood Updated: 01/14/22 2220    Narrative:      The following orders were created for panel order CBC & Differential.  Procedure                               Abnormality         Status                     ---------                               -----------         ------                     CBC Auto Differential[[id number]]        Abnormal            Final result                 Please view results for these tests on the individual orders.    CBC Auto Differential [[id number]]  (Abnormal) Collected: 01/14/22 2155    Specimen: Blood Updated: 01/14/22 2220     WBC 11.31 10*3/mm3      RBC 5.39 10*6/mm3      Hemoglobin 15.6 g/dL      Hematocrit 47.3 %      MCV 87.8 fL      MCH 28.9 pg      MCHC 33.0 g/dL      RDW 13.3 %      RDW-SD 43.0 fl      MPV 13.1 fL      Platelets 140 10*3/mm3      Neutrophil % 81.6 %      Lymphocyte % 2.8 %      Monocyte % 14.2 %      Eosinophil % 0.4 %      Basophil % 0.3 %      Immature Grans % 0.7 %      Neutrophils, Absolute 9.23 10*3/mm3      Lymphocytes, Absolute 0.32 10*3/mm3      Monocytes, Absolute 1.61 10*3/mm3      Eosinophils, Absolute 0.04 10*3/mm3      Basophils, Absolute 0.03 10*3/mm3      Immature Grans, Absolute 0.08 10*3/mm3      nRBC 0.0 /100 WBC     Blood Gas, Arterial - [[id number]]  (Abnormal) Collected: 01/14/22 2150    Specimen:  Arterial Blood Updated: 01/14/22 2158     Site Right Brachial     Carlos's Test N/A     pH, Arterial 7.465 pH units      Comment: 83 Value above reference range        pCO2, Arterial 35.1 mm Hg      pO2, Arterial 63.3 mm Hg      Comment: 84 Value below reference range        HCO3, Arterial 25.2 mmol/L      Base Excess, Arterial 1.9 mmol/L      O2 Saturation, Arterial 94.8 %      Temperature 37.0 C      Barometric Pressure for Blood Gas 752 mmHg      Modality Room Air     Ventilator Mode NA     Collected by [id number]     Comment: Meter: [id number]     :  [email or web address]        pCO2, Temperature Corrected 35.1 mm Hg      pH, Temp Corrected 7.465 pH Units      pO2, Temperature Corrected 63.3 mm Hg     COVID PRE-OP / PRE-PROCEDURE SCREENING ORDER (NO ISOLATION) - Swab, Nasal Cavity [[id number]]  (Abnormal) Collected: 01/14/22 1824    Specimen: Swab from Nasal Cavity Updated: 01/14/22 1924    Narrative:      The following orders were created for panel order COVID PRE-OP / PRE-PROCEDURE SCREENING ORDER (NO ISOLATION) - Swab, Nasal Cavity.  Procedure                               Abnormality         Status                     ---------                               -----------         ------                     COVID-19,[facility]...[[id number]]  Abnormal            Final result                 Please view results for these tests on the individual orders.    COVID-19,[facility] IN-HOUSE,Nasal Swab No Transport Media 3-4 HR TAT - Swab, Nasal Cavity [[id number]]  (Abnormal) Collected: 01/14/22 1824    Specimen: Swab from Nasal Cavity Updated: 01/14/22 1924     COVID19 Detected    Narrative:      Fact sheet for providers: https://www.fda.gov/media/768074/download     Fact sheet for patients: https://www.fda.gov/media/510152/download    Test performed by PCR.    Consider negative results in combination with clinical observations, patient history, and epidemiological information.            Chief Complaint on Day of  "Discharge: \"I'm ready to go home\" nursing messaged me this afternoon stating the patient was very eager to get home.    Hospital Course:  The patient is a 41 y.o. male who presented to [facility] with COVID symptoms.  Patient stated that he began having symptoms of COVID-19 on Wednesday night (1/12).  His primary system was generalized aches and pains, particularly in his lower extremities and hips, in addition to an ongoing headache.  Patient informed me that he had not noticed any significant dyspnea symptoms, in addition to not having a significant cough.  He did report having fever and flulike symptoms.  In the emergency department he was found to have O2 saturations of 84% on room air.  He was admitted to the hospitalist service for further work-up and management.    Patient informed me that he did receive the [employer] vaccine a number of months ago- he could not give me a specific month.  He also reported to me that he has a sleep study arranged on an outpatient basis in the near future for suspected sleep apnea.    He was started on treatment with dexamethasone.  We discussed treatment with remdesivir however patient wanted to continue to monitor for any evolving symptoms before instituting this treatment.  He was provided an albuterol HFA, incentive spirometer, and started on adjunctive treatments including vitamin C, zinc sulfate, and thiamine.    His CRP, procalcitonin, and ferritin levels have been stable.  His chest x-ray did not reveal any acute infiltrates.    Patient was initially on 2 L by nasal cannula, and when I saw him yesterday I turned him down to 1 L by nasal cannula.  Patient reports this morning he took himself off of oxygen, and states that he has been maintaining O2 saturations of 94% or above throughout the day, and is reporting eagerness and readiness for discharge home.  I am going to prescribe him 7 days of dexamethasone.  I will give him a prescription for an " "albuterol inhaler, in addition to vitamin C and zinc sulfate.    I did recommend that he purchase a pulse oximeter at a local pharmacy to monitor his O2 saturations closely, especially as he states that his symptoms only began approximately 4-5 days ago.  I informed him that if he begins having hypoxemia with O2 saturations of less than 90% to return to our facility for further assessment.  Patient states that he feels much better, denies any new shortness of breath or respiratory symptoms, O2 saturations remained stable on room air throughout the day, and plan will be for discharge home this afternoon and transition to the outpatient setting.    Condition on Discharge:  Medically stable    Physical Exam on Discharge:  /76 (BP Location: Right arm, Patient Position: Sitting)   Pulse 72   Temp 97.2 °F (36.2 °C) (Oral)   Resp 16   Ht 190.5 cm (75\")   Wt (!) 139 kg (306 lb 3.2 oz)   SpO2 94%   BMI 38.27 kg/m²   Physical Exam  Vitals reviewed.   Constitutional:       Appearance: He is not ill-appearing or toxic-appearing.   HENT:      Head: Normocephalic.      Mouth/Throat:      Pharynx: No oropharyngeal exudate.   Eyes:      Pupils: Pupils are equal, round, and reactive to light.   Cardiovascular:      Rate and Rhythm: Normal rate.   Pulmonary:      Effort: Pulmonary effort is normal. No respiratory distress.      Breath sounds: No wheezing or rales.      Comments: On room air; no work of breathing.  No accessory muscle use.  Musculoskeletal:         General: No swelling.      Cervical back: Neck supple.   Neurological:      General: No focal deficit present.      Mental Status: He is alert.   Psychiatric:         Mood and Affect: Mood normal.       Discharge Disposition:  Home or Self Care    Discharge Medications:     Discharge Medications      New Medications      Instructions Start Date   albuterol sulfate  (90 Base) MCG/ACT inhaler  Commonly known as: PROVENTIL HFA;VENTOLIN HFA;PROAIR HFA   2 " puffs, Inhalation, 4 Times Daily - RT      ascorbic acid 500 MG tablet  Commonly known as: VITAMIN C   500 mg, Oral, Daily   Start Date: January 17, 2022     dexamethasone 6 MG tablet  Commonly known as: DECADRON   6 mg, Oral, Daily With Breakfast   Start Date: January 17, 2022     famotidine 20 MG tablet  Commonly known as: PEPCID   20 mg, Oral, 2 Times Daily Before Meals      zinc sulfate 220 (50 Zn) MG capsule  Commonly known as: ZINCATE   220 mg, Oral, Daily   Start Date: January 17, 2022        Continue These Medications      Instructions Start Date   diclofenac 75 MG EC tablet  Commonly known as: VOLTAREN   75 mg, Oral, 2 Times Daily      tiZANidine 4 MG tablet  Commonly known as: ZANAFLEX   4 mg, Oral, 3 Times Daily PRN, Take 1 - 2 tablets       traZODone 100 MG tablet  Commonly known as: DESYREL   100 mg, Oral, Nightly             Discharge Diet: as tolerated    Activity at Discharge: as tolerated    Discharge Care Plan/Instructions:   We discussed obtaining a home pulse oximeter to monitor his O2 saturations on an outpatient basis.  He will take the incentive spirometer home for continued use.  He needs to stop smoking!    Follow-up Appointments:   1 week follow-up with primary care provider, telehealth if possible.    Test Results Pending at Discharge: none    Electronically signed by Deshaun Paige MD, 01/16/22, 16:24 CST.    Time: 25 min

## 2022-01-16 NOTE — PLAN OF CARE
Goal Outcome Evaluation:  Plan of Care Reviewed With: patient         Patient alert and oriented x4. RA. VSS. Up ad jose. Denies pain and discomfort. Discharge instruction reviewed with patient. Verbalized understanding. Wife to . IV removed. Patient was assisted in wheel chair to his ride.

## 2022-01-16 NOTE — PLAN OF CARE
Problem: Adult Inpatient Plan of Care  Goal: Plan of Care Review  Outcome: Ongoing, Progressing  Flowsheets (Taken 1/16/2022 8235)  Progress: improving  Plan of Care Reviewed With: patient  Outcome Summary: A&Ox4. VSS. Up ad jose. C/o bilateral hip pain, PRN dilaudid given as ordered with good relief. Covid precautions maintained. 1L NC, sats running mid 90s. No c/o SOB. Pt took O2 off while sleeping mid shift, sats dropped to 87-89%. 1L was reapplied, sats went back up to 93%. Sinus antonia on tele. Call light in reach. Safety maintained.

## 2022-01-17 ENCOUNTER — READMISSION MANAGEMENT (OUTPATIENT)
Dept: CALL CENTER | Facility: HOSPITAL | Age: 42
End: 2022-01-17

## 2022-01-17 LAB
QT INTERVAL: 290 MS
QTC INTERVAL: 394 MS

## 2022-01-17 NOTE — PAYOR COMM NOTE
"FROM: SHAWNEE SAWYER  PHONE: [phone]  FAX: [phone]    ID: [id number]    Miguel Willis (41 y.o. Male)             Date of Birth Social Security Number Address Home Phone MRN    [id number]  [id number] [phone] [id number]    Yazidi Marital Status             L[employer] Legally        Admission Date Admission Type Admitting Provider Attending Provider Department, Room/Bed    1/14/22 Emergency   [facility] 3A, 352/1    Discharge Date Discharge Disposition Discharge Destination          1/16/2022 Home or Self Care              Attending Provider: (none)   Allergies: No Known Allergies    Isolation: None   Infection: COVID (confirmed) (01/14/22)   Code Status: Prior   Advance Care Planning Activity    Ht: 190.5 cm (75\")   Wt: 139 kg (306 lb 3.2 oz)    Admission Cmt: None   Principal Problem: None                Active Insurance as of 1/14/2022     Primary Coverage     Payor Plan Insurance Group Employer/Plan Group    [facility] [id number]     Payor Plan Address Payor Plan Phone Number Payor Plan Fax Number Effective Dates    [address]   1/1/2021 - None Entered    [facility] [id number]       Subscriber Name Subscriber Birth Date Member ID       MIGUEL WILLIS 1980 [id number]                 Emergency Contacts      (Rel.) Home Phone Work Phone Mobile Phone    Carey Melgar (Friend) [phone] -- [phone]               History & Physical      Jacqueline Landrum DO at 01/15/22 0016              [facility] Medicine Services  HISTORY AND PHYSICAL    Date of Admission: 1/14/2022  Primary Care Physician: Provider, No Known    Subjective     Chief Complaint: Headache    History of Present Illness  41-year-old male who presents to the emergency department for headache and cough.  The patient's symptoms developed today.  He complains of sore throat, pounding type of migraine that is " "diffuse, bilateral ear pain, body aches, and cough.  His cough has been nonproductive.  He has no diarrhea.  He has no abdominal pain.  He has no chest pain.  He did not get COVID vaccinations.        Review of Systems   Headache  ST  Ear pain  Cough    Otherwise complete ROS reviewed and negative except as mentioned in the HPI.    Past Medical History: History reviewed. No pertinent past medical history.  Past Surgical History:  Past Surgical History:   Procedure Laterality Date   • BACK SURGERY       Social History:  reports that he has been smoking cigarettes. He has been smoking about 1.00 pack per day. He has never used smokeless tobacco.    Family History: family history is not on file.      Allergies:  No Known Allergies    Medications:  Prior to Admission medications    Medication Sig Start Date End Date Taking? Authorizing Provider   albuterol sulfate  (90 Base) MCG/ACT inhaler Inhale 2 puffs Every 4 (Four) Hours As Needed for Wheezing or Shortness of Air. 11/28/21   Louis Ramirez MD   benzonatate (TESSALON) 200 MG capsule Take 1 capsule by mouth 3 (Three) Times a Day As Needed for Cough. 11/28/21   Louis Ramirez MD   predniSONE (DELTASONE) 50 MG tablet Take 1 tablet by mouth Daily. 11/28/21   Louis Ramirez MD   pseudoephedrine (Sudafed 12 Hour) 120 MG 12 hr tablet Take 1 tablet by mouth Every 12 (Twelve) Hours. 11/28/21   Louis Ramirez MD     I have utilized all available immediate resources to obtain, update, and review the patient's current medications.    Objective     Vital Signs: /92   Pulse 109   Temp 100.1 °F (37.8 °C) (Oral)   Resp (!) 30   Ht 193 cm (76\")   Wt (!) 139 kg (306 lb)   SpO2 95%   BMI 37.25 kg/m²   Physical Exam  Vitals reviewed.   Constitutional:       Appearance: He is ill-appearing.   HENT:      Head: Normocephalic and atraumatic.      Nose: Nose normal.   Eyes:      General: No scleral icterus.     Conjunctiva/sclera: Conjunctivae " normal.   Cardiovascular:      Rate and Rhythm: Regular rhythm. Tachycardia present.      Heart sounds: Normal heart sounds.   Pulmonary:      Effort: Pulmonary effort is normal. No respiratory distress.   Abdominal:      General: There is no distension.      Palpations: Abdomen is soft.   Musculoskeletal:         General: No swelling or tenderness.      Cervical back: Normal range of motion and neck supple.   Skin:     General: Skin is warm and dry.   Neurological:      General: No focal deficit present.      Mental Status: He is alert.      Cranial Nerves: No cranial nerve deficit.   Psychiatric:         Mood and Affect: Mood normal.         Behavior: Behavior normal.        Results Reviewed:  Lab Results (last 24 hours)     Procedure Component Value Units Date/Time    Comprehensive Metabolic Panel [[id number]]  (Abnormal) Collected: 01/14/22 2155    Specimen: Blood Updated: 01/14/22 2254     Glucose 117 mg/dL      BUN 18 mg/dL      Creatinine 1.10 mg/dL      Sodium 138 mmol/L      Potassium 4.3 mmol/L      Chloride 103 mmol/L      CO2 24.0 mmol/L      Calcium 8.6 mg/dL      Total Protein 7.1 g/dL      Albumin 4.40 g/dL      ALT (SGPT) 39 U/L      AST (SGOT) 33 U/L      Alkaline Phosphatase 49 U/L      Total Bilirubin 0.5 mg/dL      eGFR Non African Amer 74 mL/min/1.73      Globulin 2.7 gm/dL      A/G Ratio 1.6 g/dL      BUN/Creatinine Ratio 16.4     Anion Gap 11.0 mmol/L     Narrative:      GFR Normal >60  Chronic Kidney Disease <60  Kidney Failure <15      C-reactive Protein [[id number]]  (Abnormal) Collected: 01/14/22 2155    Specimen: Blood Updated: 01/14/22 2253     C-Reactive Protein 0.54 mg/dL     Lactate Dehydrogenase [[id number]]  (Abnormal) Collected: 01/14/22 2155    Specimen: Blood Updated: 01/14/22 2252      U/L      Comment: Specimen hemolyzed.  Results may be affected.       Procalcitonin [[id number]]  (Normal) Collected: 01/14/22 2155    Specimen: Blood Updated: 01/14/22 2241     Procalcitonin  "0.14 ng/mL     Narrative:      As a Marker for Sepsis (Non-Neonates):     1. <0.5 ng/mL represents a low risk of severe sepsis and/or septic shock.  2. >2 ng/mL represents a high risk of severe sepsis and/or septic shock.    As a Marker for Lower Respiratory Tract Infections that require antibiotic therapy:  PCT on Admission     Antibiotic Therapy             6-12 Hrs later  >0.5                          Strongly Recommended            >0.25 - <0.5             Recommended  0.1 - 0.25                  Discouraged                       Remeasure/reassess PCT  <0.1                         Strongly Discouraged         Remeasure/reassess PCT      As 28 day mortality risk marker: \"Change in Procalcitonin Result\" (>80% or <=80%) if Day 0 (or Day 1) and Day 4 values are available. Refer to http://www.Taboolapct-calculator.com/    Change in PCT <=80 %   A decrease of PCT levels below or equal to 80% defines a positive change in PCT test result representing a higher risk for 28-day all-cause mortality of patients diagnosed with severe sepsis or septic shock.    Change in PCT >80 %   A decrease of PCT levels of more than 80% defines a negative change in PCT result representing a lower risk for 28-day all-cause mortality of patients diagnosed with severe sepsis or septic shock.                Lactic Acid, Plasma [[id number]]  (Normal) Collected: 01/14/22 2155    Specimen: Blood Updated: 01/14/22 2233     Lactate 1.7 mmol/L     D-dimer, Quantitative [[id number]]  (Normal) Collected: 01/14/22 2155    Specimen: Blood Updated: 01/14/22 2225     D-Dimer, Quantitative 0.32 mg/L (FEU)     Narrative:      Reference Range is 0-0.50 mg/L FEU. However, results <0.50 mg/L FEU tends to rule out DVT or PE. Results >0.50 mg/L FEU are not useful in predicting absence or presence of DVT or PE.      CBC & Differential [[id number]]  (Abnormal) Collected: 01/14/22 2155    Specimen: Blood Updated: 01/14/22 2220    Narrative:      The following orders " were created for panel order CBC & Differential.  Procedure                               Abnormality         Status                     ---------                               -----------         ------                     CBC Auto Differential[[id number]]        Abnormal            Final result                 Please view results for these tests on the individual orders.    CBC Auto Differential [[id number]]  (Abnormal) Collected: 01/14/22 2155    Specimen: Blood Updated: 01/14/22 2220     WBC 11.31 10*3/mm3      RBC 5.39 10*6/mm3      Hemoglobin 15.6 g/dL      Hematocrit 47.3 %      MCV 87.8 fL      MCH 28.9 pg      MCHC 33.0 g/dL      RDW 13.3 %      RDW-SD 43.0 fl      MPV 13.1 fL      Platelets 140 10*3/mm3      Neutrophil % 81.6 %      Lymphocyte % 2.8 %      Monocyte % 14.2 %      Eosinophil % 0.4 %      Basophil % 0.3 %      Immature Grans % 0.7 %      Neutrophils, Absolute 9.23 10*3/mm3      Lymphocytes, Absolute 0.32 10*3/mm3      Monocytes, Absolute 1.61 10*3/mm3      Eosinophils, Absolute 0.04 10*3/mm3      Basophils, Absolute 0.03 10*3/mm3      Immature Grans, Absolute 0.08 10*3/mm3      nRBC 0.0 /100 WBC     Blood Gas, Arterial - [[id number]]  (Abnormal) Collected: 01/14/22 2150    Specimen: Arterial Blood Updated: 01/14/22 2158     Site Right Brachial     Carlos's Test N/A     pH, Arterial 7.465 pH units      Comment: 83 Value above reference range        pCO2, Arterial 35.1 mm Hg      pO2, Arterial 63.3 mm Hg      Comment: 84 Value below reference range        HCO3, Arterial 25.2 mmol/L      Base Excess, Arterial 1.9 mmol/L      O2 Saturation, Arterial 94.8 %      Temperature 37.0 C      Barometric Pressure for Blood Gas 752 mmHg      Modality Room Air     Ventilator Mode NA     Collected by [id number]     Comment: Meter: [id number]     :  [email or web address]        pCO2, Temperature Corrected 35.1 mm Hg      pH, Temp Corrected 7.465 pH Units      pO2, Temperature Corrected 63.3 mm Hg     COVID PRE-OP  / PRE-PROCEDURE SCREENING ORDER (NO ISOLATION) - Swab, Nasal Cavity [[id number]]  (Abnormal) Collected: 01/14/22 1824    Specimen: Swab from Nasal Cavity Updated: 01/14/22 1924    Narrative:      The following orders were created for panel order COVID PRE-OP / PRE-PROCEDURE SCREENING ORDER (NO ISOLATION) - Swab, Nasal Cavity.  Procedure                               Abnormality         Status                     ---------                               -----------         ------                     COVID-19,[facility]...[[id number]]  Abnormal            Final result                 Please view results for these tests on the individual orders.    COVID-19,[facility] IN-HOUSE,Nasal Swab No Transport Media 3-4 HR TAT - Swab, Nasal Cavity [[id number]]  (Abnormal) Collected: 01/14/22 1824    Specimen: Swab from Nasal Cavity Updated: 01/14/22 1924     COVID19 Detected    Narrative:      Fact sheet for providers: https://www.fda.gov/media/487125/download     Fact sheet for patients: https://www.fda.gov/media/486066/download    Test performed by PCR.    Consider negative results in combination with clinical observations, patient history, and epidemiological information.        Imaging Results (Last 24 Hours)     Procedure Component Value Units Date/Time    XR Chest AP [[id number]] Resulted: 01/14/22 2201     Updated: 01/14/22 2202        I have personally reviewed and interpreted the radiology studies and ECG obtained at time of admission.     Assessment / Plan     Assessment:   Active Hospital Problems    Diagnosis    • COVID-19      Impression:  1. COVID-19  2. Hypoxia  3. Headache   4. Mild hyperglycemia    Plan:   1. 02 support  2. Zinc and Vitamin C  3. Pepcid and Decadron  4. Labs in the am  5. PRN albuterol MDI  6. Dilaudid for HA PRN       Code Status/Advanced Care Plan: Full full    The patient's surrogate decision maker is family.     I discussed my findings and recommendations with the patient.    Estimated length  of stay is 1 to 2 days.     The patient was seen and examined by me on 1/14/2022 at seen before midnight.    Electronically signed by Jacqueline Landrum DO, 01/15/22, 00:16 CST.                Electronically signed by Jacqueline Landrum DO at 01/15/22 0025          Emergency Department Notes      Alberto Dalal PA-C at 01/14/22 2120     Attestation signed by Dave De Jesus Jr., MD at 01/15/22 0102          For this patient encounter, I reviewed the NP or PA documentation, treatment plan, and medical decision making. Dave De Jesus Jr, MD 1/15/2022 01:02 CST                  Subjective   History of Present Illness    Patient is an otherwise healthy 41-year-old male presenting to ED with concern for COVID.  Patient states that at 11 AM he had a sudden onset of a severe generalized headache, severe body aching, as well as generalized weakness and a nonproductive cough.  Patient reports throughout the day has had subjective fevers where he will break out in a sweat and be followed by chills.  Patient reports he has tried no medications and his symptoms have continued to worsen which time he presents for further evaluation.  Patient states that 2 weeks ago his stepdaughter did have COVID-19 however he denies any other known recent sick contact.  Patient does work with the public.  Patient describes some nausea but denies any emesis, abdominal pain, or diarrhea.    Patient received his COVID vaccinations but not a booster.    Records reviewed show patient last seen in the ED on 11/28/2021 for cough and bronchitis.    Review of Systems   Constitutional: Positive for chills, diaphoresis and fever (Subjective).   HENT: Positive for congestion. Negative for sore throat.    Eyes: Negative.    Respiratory: Positive for cough. Negative for chest tightness and shortness of breath.    Cardiovascular: Negative.    Gastrointestinal: Positive for nausea. Negative for abdominal pain, diarrhea and vomiting.   Genitourinary:  Negative.    Musculoskeletal: Positive for myalgias.   Skin: Negative.    Neurological: Positive for headaches.   Psychiatric/Behavioral: Negative.    All other systems reviewed and are negative.      History reviewed. No pertinent past medical history.    No Known Allergies    Past Surgical History:   Procedure Laterality Date   • BACK SURGERY         History reviewed. No pertinent family history.    Social History     Socioeconomic History   • Marital status: Legally    Tobacco Use   • Smoking status: Current Every Day Smoker     Packs/day: 1.00     Types: Cigarettes   • Smokeless tobacco: Never Used           Objective   Physical Exam  Vitals and nursing note reviewed.   Constitutional:       General: He is in acute distress (appears uncomfortable).      Appearance: Normal appearance. He is well-developed, well-groomed and overweight. He is ill-appearing. He is not toxic-appearing or diaphoretic.   HENT:      Head: Normocephalic.      Mouth/Throat:      Mouth: Mucous membranes are moist.      Pharynx: Oropharynx is clear. Posterior oropharyngeal erythema present.   Eyes:      Extraocular Movements: Extraocular movements intact.      Conjunctiva/sclera: Conjunctivae normal.      Pupils: Pupils are equal, round, and reactive to light.   Cardiovascular:      Rate and Rhythm: Tachycardia present.   Pulmonary:      Effort: Pulmonary effort is normal.      Breath sounds: Normal breath sounds.   Abdominal:      General: Bowel sounds are normal.      Palpations: Abdomen is soft.      Tenderness: There is no abdominal tenderness.   Musculoskeletal:         General: Normal range of motion.      Cervical back: Normal range of motion and neck supple.      Right lower leg: No edema.      Left lower leg: No edema.   Skin:     General: Skin is warm and dry.      Findings: No rash.   Neurological:      Mental Status: He is alert and oriented to person, place, and time.      Gait: Gait normal.   Psychiatric:         Mood  and Affect: Mood normal.         Behavior: Behavior normal. Behavior is cooperative.         Procedures          ED Course                                                 MDM  Number of Diagnoses or Management Options     Amount and/or Complexity of Data Reviewed  Clinical lab tests: ordered and reviewed  Tests in the radiology section of CPT®: reviewed and ordered  Tests in the medicine section of CPT®: reviewed and ordered  Decide to obtain previous medical records or to obtain history from someone other than the patient: yes  Review and summarize past medical records: yes  Discuss the patient with other providers: yes (Dr. Aj De Jesus (attending)  Dr. Landrum (hospitalist))    Patient Progress  Patient progress: stable      Patient is an otherwise healthy 41-year-old male presenting to ED with concern for COVID.  CBC with leukocytosis 11.31, elevated relative neutrophils 81.6%, decreased lymphocytes 2.8%.  CMP unremarkable.  Lactic acid, procalcitonin WNL.  LDH elevated at 273, CRP elevated at 0.54.  D-dimer within normal limits at 0.32.  ABG with alkalotic pH 7.465 and decreased PO2 of 63.3.  Patient is COVID-positive.  Patient's initial hypertensive state and tachycardia were improved with a fluid bolus.  Patient was given Tylenol, Benadryl, Zofran, magnesium and had improvement of his headache.  Patient became tachypneic and began satting at 84% on room air for which she was started on 3 to 4 L of nasal cannula oxygen.  Discussed with patient need for admission for further evaluation and monitoring of his symptoms for which he is amenable with no further questions, concerns, or needs.  Case was discussed with Dr. Figueroa, hospitalist, who will kindly accept patient for admission under her services at this time with no further recommendations.    Final diagnoses:   COVID-19   Hypoxia       ED Disposition  ED Disposition     ED Disposition Condition Comment    Decision to Admit  Level of Care: Med/Surg [1]    Diagnosis: COVID-19 [[id number]]   Admitting Physician: JAZZMINE BERTRAND [[id number]]            No follow-up provider specified.       Medication List      No changes were made to your prescriptions during this visit.          Alberto Dalal PA-C  01/15/22 0009      Electronically signed by Dave De Jesus Jr., MD at 01/15/22 0102     Cyndi Bunch, RN at 01/14/22 2236        Patient desating to 85% on room air, patient placed on 4L nasal cannula. Alberto dalal notified.     Cyndi Bunch, RN  01/14/22 2237      Electronically signed by Cyndi Bunch, RN at 01/14/22 2237       Vital Signs (last 3 days) before discharge     Date/Time Temp Temp src Pulse Resp BP Patient Position SpO2    01/16/22 1442 -- -- 72 16 -- -- 94    01/16/22 1129 97.2 (36.2) Oral 72 16 136/76 Sitting 95    01/16/22 1106 -- -- -- -- -- -- 94    01/16/22 0756 97.2 (36.2) Oral 70 16 145/78 Lying 97    01/16/22 0710 -- -- 94 -- -- -- --    01/16/22 0658 -- -- 70 16 -- -- 94    01/16/22 0448 97.2 (36.2) Oral 68 16 136/86 Lying 95    01/16/22 0250 -- -- 74 16 -- -- 93    01/16/22 0124 96.7 (35.9) Oral 60 16 110/85 Lying 93    01/15/22 2035 -- -- 83 16 -- -- 94    01/15/22 1933 98.1 (36.7) Oral 66 18 142/80 Lying 96    01/15/22 1500 99.6 (37.6) Oral 93 22 136/73 Sitting 96    01/15/22 1236 100.7 (38.2) Oral 91 21 146/83 Sitting 94    01/15/22 0742 99.2 (37.3) Oral 85 20 119/84 Lying 95    01/15/22 0713 -- -- 100 20 -- -- 97    01/15/22 0554 100.9 (38.3) Oral 96 22 148/92 Lying 95    01/15/22 0218 99.7 (37.6) Oral 87 26 130/90 Lying 100    01/15/22 0120 -- -- -- -- -- -- 96    01/15/22 0118 -- -- -- -- -- -- 95    01/15/22 0001 -- -- 109 -- 128/92 -- --    01/15/22 0000 -- -- -- -- -- -- 95    01/14/22 2301 -- -- 103 -- 109/83 -- 94    01/14/22 2235 -- -- -- -- -- -- 90    01/14/22 2234 100.1 (37.8) Oral 104 30 123/67 -- 89    01/14/22 2229 -- -- 103 -- -- -- 85    01/14/22 2227 -- -- -- -- -- -- 84    01/14/22 1825 -- -- -- -- 133/84 Lying  --    01/14/22 1814 99.8 (37.7) Oral 117 19 154/117  Sitting 98    Comments:   BP: RN notified at 01/14/22 1814           Facility-Administered Medications as of 1/16/2022   Medication Dose Route Frequency Provider Last Rate Last Admin   • [COMPLETED] acetaminophen (TYLENOL) tablet 1,000 mg  1,000 mg Oral Once Alberto Dalal PA-C   1,000 mg at 01/14/22 2312   • [COMPLETED] diphenhydrAMINE (BENADRYL) injection 25 mg  25 mg Intravenous Once Alberto Dalal PA-C   25 mg at 01/14/22 2158   • [COMPLETED] lactated ringers bolus 1,000 mL  1,000 mL Intravenous Once Alberto Dalal PA-C   Stopped at 01/15/22 0006   • [COMPLETED] magnesium sulfate 2g/50 mL (PREMIX) infusion  2 g Intravenous Once Alberto Dalal PA-C   Stopped at 01/15/22 0206   • [COMPLETED] ondansetron (ZOFRAN) injection 4 mg  4 mg Intravenous Once Alberto Dalal PA-C   4 mg at 01/14/22 2158     Lab Results (last 72 hours)     Procedure Component Value Units Date/Time    Comprehensive Metabolic Panel [[id number]]  (Abnormal) Collected: 01/15/22 0441    Specimen: Blood Updated: 01/15/22 0541     Glucose 99 mg/dL      BUN 17 mg/dL      Creatinine 0.97 mg/dL      Sodium 138 mmol/L      Potassium 4.2 mmol/L      Chloride 103 mmol/L      CO2 27.0 mmol/L      Calcium 8.3 mg/dL      Total Protein 6.6 g/dL      Albumin 4.20 g/dL      ALT (SGPT) 35 U/L      AST (SGOT) 29 U/L      Alkaline Phosphatase 46 U/L      Total Bilirubin 0.5 mg/dL      eGFR Non African Amer 85 mL/min/1.73      Globulin 2.4 gm/dL      A/G Ratio 1.8 g/dL      BUN/Creatinine Ratio 17.5     Anion Gap 8.0 mmol/L     Narrative:      GFR Normal >60  Chronic Kidney Disease <60  Kidney Failure <15      Troponin [[id number]]  (Normal) Collected: 01/15/22 0441    Specimen: Blood Updated: 01/15/22 0541     Troponin T <0.010 ng/mL     Narrative:      Troponin T Reference Range:  <= 0.03 ng/mL-   Negative for AMI  >0.03 ng/mL-     Abnormal for myocardial necrosis.  Clinicians would have to utilize  clinical acumen, EKG, Troponin and serial changes to determine if it is an Acute Myocardial Infarction or myocardial injury due to an underlying chronic condition.       Results may be falsely decreased if patient taking Biotin.      Ferritin [[id number]]  (Normal) Collected: 01/15/22 0441    Specimen: Blood Updated: 01/15/22 0541     Ferritin 199.50 ng/mL     Narrative:      Results may be falsely decreased if patient taking Biotin.      CBC Auto Differential [[id number]]  (Abnormal) Collected: 01/15/22 0441    Specimen: Blood Updated: 01/15/22 0519     WBC 7.60 10*3/mm3      RBC 5.13 10*6/mm3      Hemoglobin 15.3 g/dL      Hematocrit 45.3 %      MCV 88.3 fL      MCH 29.8 pg      MCHC 33.8 g/dL      RDW 13.3 %      RDW-SD 43.5 fl      MPV 13.3 fL      Platelets 125 10*3/mm3      Neutrophil % 73.2 %      Lymphocyte % 6.6 %      Monocyte % 19.1 %      Eosinophil % 0.3 %      Basophil % 0.3 %      Immature Grans % 0.5 %      Neutrophils, Absolute 5.57 10*3/mm3      Lymphocytes, Absolute 0.50 10*3/mm3      Monocytes, Absolute 1.45 10*3/mm3      Eosinophils, Absolute 0.02 10*3/mm3      Basophils, Absolute 0.02 10*3/mm3      Immature Grans, Absolute 0.04 10*3/mm3      nRBC 0.0 /100 WBC     Comprehensive Metabolic Panel [[id number]]  (Abnormal) Collected: 01/14/22 2155    Specimen: Blood Updated: 01/14/22 2254     Glucose 117 mg/dL      BUN 18 mg/dL      Creatinine 1.10 mg/dL      Sodium 138 mmol/L      Potassium 4.3 mmol/L      Chloride 103 mmol/L      CO2 24.0 mmol/L      Calcium 8.6 mg/dL      Total Protein 7.1 g/dL      Albumin 4.40 g/dL      ALT (SGPT) 39 U/L      AST (SGOT) 33 U/L      Alkaline Phosphatase 49 U/L      Total Bilirubin 0.5 mg/dL      eGFR Non African Amer 74 mL/min/1.73      Globulin 2.7 gm/dL      A/G Ratio 1.6 g/dL      BUN/Creatinine Ratio 16.4     Anion Gap 11.0 mmol/L     Narrative:      GFR Normal >60  Chronic Kidney Disease <60  Kidney Failure <15      C-reactive Protein [[id number]]  (Abnormal)  "Collected: 01/14/22 2155    Specimen: Blood Updated: 01/14/22 2253     C-Reactive Protein 0.54 mg/dL     Lactate Dehydrogenase [[id number]]  (Abnormal) Collected: 01/14/22 2155    Specimen: Blood Updated: 01/14/22 2252      U/L      Comment: Specimen hemolyzed.  Results may be affected.       Procalcitonin [[id number]]  (Normal) Collected: 01/14/22 2155    Specimen: Blood Updated: 01/14/22 2241     Procalcitonin 0.14 ng/mL     Narrative:      As a Marker for Sepsis (Non-Neonates):     1. <0.5 ng/mL represents a low risk of severe sepsis and/or septic shock.  2. >2 ng/mL represents a high risk of severe sepsis and/or septic shock.    As a Marker for Lower Respiratory Tract Infections that require antibiotic therapy:  PCT on Admission     Antibiotic Therapy             6-12 Hrs later  >0.5                          Strongly Recommended            >0.25 - <0.5             Recommended  0.1 - 0.25                  Discouraged                       Remeasure/reassess PCT  <0.1                         Strongly Discouraged         Remeasure/reassess PCT      As 28 day mortality risk marker: \"Change in Procalcitonin Result\" (>80% or <=80%) if Day 0 (or Day 1) and Day 4 values are available. Refer to http://www.[employer]-pct-calculator.com/    Change in PCT <=80 %   A decrease of PCT levels below or equal to 80% defines a positive change in PCT test result representing a higher risk for 28-day all-cause mortality of patients diagnosed with severe sepsis or septic shock.    Change in PCT >80 %   A decrease of PCT levels of more than 80% defines a negative change in PCT result representing a lower risk for 28-day all-cause mortality of patients diagnosed with severe sepsis or septic shock.                Lactic Acid, Plasma [[id number]]  (Normal) Collected: 01/14/22 2155    Specimen: Blood Updated: 01/14/22 2233     Lactate 1.7 mmol/L     D-dimer, Quantitative [[id number]]  (Normal) Collected: 01/14/22 2155    Specimen: Blood " Updated: 01/14/22 2225     D-Dimer, Quantitative 0.32 mg/L (FEU)     Narrative:      Reference Range is 0-0.50 mg/L FEU. However, results <0.50 mg/L FEU tends to rule out DVT or PE. Results >0.50 mg/L FEU are not useful in predicting absence or presence of DVT or PE.      CBC & Differential [[id number]]  (Abnormal) Collected: 01/14/22 2155    Specimen: Blood Updated: 01/14/22 2220    Narrative:      The following orders were created for panel order CBC & Differential.  Procedure                               Abnormality         Status                     ---------                               -----------         ------                     CBC Auto Differential[[id number]]        Abnormal            Final result                 Please view results for these tests on the individual orders.    CBC Auto Differential [[id number]]  (Abnormal) Collected: 01/14/22 2155    Specimen: Blood Updated: 01/14/22 2220     WBC 11.31 10*3/mm3      RBC 5.39 10*6/mm3      Hemoglobin 15.6 g/dL      Hematocrit 47.3 %      MCV 87.8 fL      MCH 28.9 pg      MCHC 33.0 g/dL      RDW 13.3 %      RDW-SD 43.0 fl      MPV 13.1 fL      Platelets 140 10*3/mm3      Neutrophil % 81.6 %      Lymphocyte % 2.8 %      Monocyte % 14.2 %      Eosinophil % 0.4 %      Basophil % 0.3 %      Immature Grans % 0.7 %      Neutrophils, Absolute 9.23 10*3/mm3      Lymphocytes, Absolute 0.32 10*3/mm3      Monocytes, Absolute 1.61 10*3/mm3      Eosinophils, Absolute 0.04 10*3/mm3      Basophils, Absolute 0.03 10*3/mm3      Immature Grans, Absolute 0.08 10*3/mm3      nRBC 0.0 /100 WBC     Blood Gas, Arterial - [[id number]]  (Abnormal) Collected: 01/14/22 2150    Specimen: Arterial Blood Updated: 01/14/22 2158     Site Right Brachial     Carlos's Test N/A     pH, Arterial 7.465 pH units      Comment: 83 Value above reference range        pCO2, Arterial 35.1 mm Hg      pO2, Arterial 63.3 mm Hg      Comment: 84 Value below reference range        HCO3, Arterial 25.2 mmol/L       Base Excess, Arterial 1.9 mmol/L      O2 Saturation, Arterial 94.8 %      Temperature 37.0 C      Barometric Pressure for Blood Gas 752 mmHg      Modality Room Air     Ventilator Mode NA     Collected by [id number]     Comment: Meter: [id number]     :  [email or web address]        pCO2, Temperature Corrected 35.1 mm Hg      pH, Temp Corrected 7.465 pH Units      pO2, Temperature Corrected 63.3 mm Hg     COVID PRE-OP / PRE-PROCEDURE SCREENING ORDER (NO ISOLATION) - Swab, Nasal Cavity [[id number]]  (Abnormal) Collected: 01/14/22 1824    Specimen: Swab from Nasal Cavity Updated: 01/14/22 1924    Narrative:      The following orders were created for panel order COVID PRE-OP / PRE-PROCEDURE SCREENING ORDER (NO ISOLATION) - Swab, Nasal Cavity.  Procedure                               Abnormality         Status                     ---------                               -----------         ------                     COVID-19,[facility]...[[id number]]  Abnormal            Final result                 Please view results for these tests on the individual orders.    COVID-19,[facility] IN[facility]HOU[facility],Nasal Swab No Transport Media 3-4 HR TAT - Swab, Nasal Cavity [[id number]]  (Abnormal) Collected: 01/14/22 1824    Specimen: Swab from Nasal Cavity Updated: 01/14/22 1924     COVID19 Detected    Narrative:      Fact sheet for providers: https://www.fda.gov/media/358070/download     Fact sheet for patients: https://www.fda.gov/media/662576/download    Test performed by PCR.    Consider negative results in combination with clinical observations, patient history, and epidemiological information.          Imaging Results (Last 72 Hours)     Procedure Component Value Units Date/Time    XR Chest AP [[id number]] Collected: 01/15/22 0741     Updated: 01/15/22 0748    Narrative:      EXAMINATION: Chest 1 view 1/14/2022     HISTORY: Cough and fever. Covid evaluation.     FINDINGS: Upright frontal projection of the chest is compared to  prior  exam 11/28/2021. Lungs are hypoventilated causing accentuation of the  bronchovascular markings and cardiac silhouette. There is a granuloma  within the left lung apex. There is no evidence of lobar pneumonia or  effusion.       Impression:      1.. Expiratory chest. No acute disease.  This report was finalized on 01/15/2022 07:45 by Dr. Jovan Braden MD.        ECG/EMG Results (last 72 hours)     ** No results found for the last 72 hours. **        Orders (last 72 hrs)      Start     Ordered    01/17/22 0000  ascorbic acid (VITAMIN C) 500 MG tablet  Daily         01/16/22 1624    01/17/22 0000  dexamethasone (DECADRON) 6 MG tablet  Daily With Breakfast         01/16/22 1624    01/17/22 0000  zinc sulfate (ZINCATE) 220 (50 Zn) MG capsule  Daily         01/16/22 1624    01/16/22 1623  Discontinue IV  Once,   Status:  Canceled         01/16/22 1624    01/16/22 1622  Discharge patient  Once         01/16/22 1624    01/16/22 0000  albuterol sulfate  (90 Base) MCG/ACT inhaler  4 Times Daily - RT         01/16/22 1624    01/16/22 0000  famotidine (PEPCID) 20 MG tablet  2 Times Daily Before Meals         01/16/22 1624    01/16/22 0000  Discharge Instructions        Comments: We discussed obtaining/purchasing a home pulsoximeter for outpatient use    01/16/22 1624    01/16/22 0000  Discharge Follow-up with PCP         01/16/22 1624    01/15/22 2100  traZODone (DESYREL) tablet 100 mg  Nightly,   Status:  Discontinued         01/15/22 1753    01/15/22 1845  famotidine (PEPCID) tablet 20 mg  2 Times Daily Before Meals,   Status:  Discontinued         01/15/22 1751    01/15/22 1845  thiamine (VITAMIN B-1) tablet 100 mg  Daily,   Status:  Discontinued         01/15/22 1751    01/15/22 1800  Incentive Spirometry  Every 2 Hours While Awake,   Status:  Canceled       01/15/22 1753    01/15/22 1651  Inpatient Admission  Once         01/15/22 1650    01/15/22 0900  zinc sulfate (ZINCATE) capsule 220 mg  Daily,    Status:  Discontinued         01/15/22 0148    01/15/22 0900  ascorbic acid (VITAMIN C) tablet 500 mg  Daily,   Status:  Discontinued         01/15/22 0148    01/15/22 0900  famotidine (PEPCID) injection 20 mg  Every 12 Hours Scheduled,   Status:  Discontinued         01/15/22 0148    01/15/22 0900  enoxaparin (LOVENOX) syringe 40 mg  Every 24 Hours Scheduled,   Status:  Discontinued         01/15/22 0153    01/15/22 0800  dexamethasone (DECADRON) tablet 6 mg  Daily With Breakfast,   Status:  Discontinued         01/15/22 0148    01/15/22 0700  albuterol sulfate HFA (PROVENTIL HFA;VENTOLIN HFA;PROAIR HFA) inhaler 2 puff  4 Times Daily - RT,   Status:  Discontinued         01/15/22 0148    01/15/22 0616  acetaminophen (TYLENOL) tablet 650 mg  Every 6 Hours PRN,   Status:  Discontinued         01/15/22 0617    01/15/22 0615  acetaminophen (TYLENOL) tablet 650 mg  Every 6 Hours PRN,   Status:  Discontinued         01/15/22 0615    01/15/22 0600  CBC Auto Differential  Morning Draw         01/15/22 0148    01/15/22 0600  Comprehensive Metabolic Panel  Morning Draw         01/15/22 0148    01/15/22 0600  Troponin  Morning Draw         01/15/22 0148    01/15/22 0600  Ferritin  Morning Draw         01/15/22 0148    01/15/22 0516  Manual Differential  Once,   Status:  Canceled         01/15/22 0515    01/15/22 0400  Vital Signs  Every 4 Hours,   Status:  Canceled       01/15/22 0148    01/15/22 0248  influenza vac split quad (FLUZONE,FLUARIX,AFLURIA,FLULAVAL) injection 0.5 mL  During Hospitalization,   Status:  Discontinued         01/15/22 0248    01/15/22 0245  sodium chloride 0.9 % flush 10 mL  Every 12 Hours Scheduled,   Status:  Discontinued         01/15/22 0148    01/15/22 0245  enoxaparin (LOVENOX) syringe 30 mg  Every 24 Hours,   Status:  Discontinued         01/15/22 0148    01/15/22 0149  Intake & Output  Every Shift,   Status:  Canceled       01/15/22 0148    01/15/22 0149  Weigh Patient  Once,   Status:   Canceled         01/15/22 0148    01/15/22 0149  Oxygen Therapy- Nasal Cannula; Titrate for SPO2: 90% - 95%  Continuous,   Status:  Canceled         01/15/22 0148    01/15/22 0149  Insert Peripheral IV  Once,   Status:  Canceled         01/15/22 0148    01/15/22 0149  Saline Lock & Maintain IV Access  Continuous,   Status:  Canceled         01/15/22 0148    01/15/22 0149  Cardiac Monitoring  Continuous,   Status:  Canceled         01/15/22 0148    01/15/22 0149  Pulse Oximetry, Continuous  Continuous,   Status:  Canceled         01/15/22 0148    01/15/22 0149  Activity - Ad Narcisa  Until Discontinued,   Status:  Canceled         01/15/22 0148    01/15/22 0149  Oral Care  Once         01/15/22 0148    01/15/22 0149  Diet Regular  Diet Effective Now,   Status:  Canceled         01/15/22 0148    01/15/22 0148  sodium chloride 0.9 % flush 10 mL  As Needed,   Status:  Discontinued         01/15/22 0148    01/15/22 0148  ondansetron (ZOFRAN) injection 4 mg  Every 6 Hours PRN,   Status:  Discontinued         01/15/22 0148    01/15/22 0148  HYDROmorphone (DILAUDID) injection 0.5 mg  Every 4 Hours PRN,   Status:  Discontinued         01/15/22 0148    01/15/22 0148  acetaminophen (TYLENOL) 160 MG/5ML solution 650 mg  Every 4 Hours PRN,   Status:  Discontinued         01/15/22 0148    01/15/22 0015  Code Status and Medical Interventions:  Continuous,   Status:  Canceled         01/15/22 0016    01/14/22 2354  Initiate Observation Status  Once         01/14/22 2353    01/14/22 2239  acetaminophen (TYLENOL) tablet 1,000 mg  Once         01/14/22 2237 01/14/22 2237  Check temperature  Once         01/14/22 2237 01/14/22 2237  Vital Signs  Per Hospital Policy         01/14/22 2237 01/14/22 2218  Manual Differential  Once,   Status:  Canceled         01/14/22 2217 01/14/22 2159  Blood Gas, Arterial -  PROCEDURE ONCE         01/14/22 2150 01/14/22 2132  lactated ringers bolus 1,000 mL  Once         01/14/22 2130     "01/14/22 2132  magnesium sulfate 2g/50 mL (PREMIX) infusion  Once         01/14/22 2130 01/14/22 2132  diphenhydrAMINE (BENADRYL) injection 25 mg  Once         01/14/22 2130 01/14/22 2132  ondansetron (ZOFRAN) injection 4 mg  Once         01/14/22 2130 01/14/22 2130  CBC & Differential  Once         01/14/22 2130 01/14/22 2130  Comprehensive Metabolic Panel  Once         01/14/22 2130 01/14/22 2130  XR Chest AP  1 Time Imaging         01/14/22 2130 01/14/22 2130  Blood Gas, Arterial -  STAT         01/14/22 2130 01/14/22 2130  Lactic Acid, Plasma  STAT         01/14/22 2130 01/14/22 2130  C-reactive Protein  STAT         01/14/22 2130 01/14/22 2130  D-dimer, Quantitative  STAT         01/14/22 2130 01/14/22 2130  Procalcitonin  STAT         01/14/22 2130 01/14/22 2130  Lactate Dehydrogenase  STAT         01/14/22 2130 01/14/22 2130  Insert peripheral IV  Once,   Status:  Canceled        \"And\" Linked Group Details    01/14/22 2130 01/14/22 2130  CBC Auto Differential  PROCEDURE ONCE         01/14/22 2130 01/14/22 2129  sodium chloride 0.9 % flush 10 mL  As Needed,   Status:  Discontinued        \"And\" Linked Group Details    01/14/22 2130 01/14/22 1819  ECG 12 Lead  STAT         01/14/22 1818 01/14/22 1815  COVID PRE-OP / PRE-PROCEDURE SCREENING ORDER (NO ISOLATION) - Swab, Nasal Cavity  Once         01/14/22 1814 01/14/22 1815  COVID-19,[facility] IN-HOUSE,Nasal Swab No Transport Media 3-4 HR TAT - Swab, Nasal Cavity  PROCEDURE ONCE         01/14/22 1814    --  traZODone (DESYREL) 100 MG tablet  Nightly         01/15/22 0017    --  SCANNED - TELEMETRY           01/14/22 0000    --  tiZANidine (ZANAFLEX) 4 MG tablet  3 Times Daily PRN         01/15/22 1004    --  diclofenac (VOLTAREN) 75 MG EC tablet  2 Times Daily         01/15/22 1004                   Physician Progress Notes (last 72 hours)      Deshaun Paige MD at 01/15/22 0883              [facility] " [facility] Medicine Services  INPATIENT PROGRESS NOTE    Patient Name: Miguel Willis  Date of Admission: 1/14/2022  Today's Date: 01/15/22  Length of Stay: 0  Primary Care Physician: Provider, No Known    Subjective   Chief Complaint: follow-up COVID  HPI   Patient states that he is doing okay.  He reports that he began having symptoms on Wednesday night (1/12).  He reports that he got the [employer] vaccine a number of months ago.  He does smoke about a pack of cigarettes per day.  He reports having aches and pains, particularly in his hips.  His headache is getting better.  He has not noticed any significant dyspnea with exertion today, and states that he can get up and go to the bathroom without any shortness of breath.  No significant cough.  He has had ongoing fever and flulike symptoms.  He states that his primary doctor has arranged for him to get a sleep study on an outpatient basis here in the near future for suspected sleep apnea.    Review of Systems     All pertinent negatives and positives are as above. All other systems have been reviewed and are negative unless otherwise stated.     Objective    Temp:  [99.2 °F (37.3 °C)-100.9 °F (38.3 °C)] 100.7 °F (38.2 °C)  Heart Rate:  [] 91  Resp:  [19-30] 21  BP: (109-154)/() 146/83  Physical Exam  Vitals reviewed.   Constitutional:       Appearance: He is not toxic-appearing.   HENT:      Head: Normocephalic.      Mouth/Throat:      Pharynx: No oropharyngeal exudate.   Eyes:      Pupils: Pupils are equal, round, and reactive to light.   Cardiovascular:      Rate and Rhythm: Normal rate.   Pulmonary:      Effort: Pulmonary effort is normal.      Breath sounds: Normal breath sounds.      Comments: Currently on 2L; I turned him down to 1LNC during my assessment  Abdominal:      Palpations: Abdomen is soft.   Musculoskeletal:         General: No deformity.      Cervical back: Neck supple.   Skin:     General: Skin is warm.       Capillary Refill: Capillary refill takes less than 2 seconds.   Neurological:      General: No focal deficit present.      Mental Status: He is alert.   Psychiatric:         Mood and Affect: Mood normal.         Results Review:  I have reviewed the labs, radiology results, and diagnostic studies.    Laboratory Data:   Results from last 7 days   Lab Units 01/15/22  0441 01/14/22  2155   WBC 10*3/mm3 7.60 11.31*   HEMOGLOBIN g/dL 15.3 15.6   HEMATOCRIT % 45.3 47.3   PLATELETS 10*3/mm3 125* 140        Results from last 7 days   Lab Units 01/15/22  0441 01/14/22  2155   SODIUM mmol/L 138 138   POTASSIUM mmol/L 4.2 4.3   CHLORIDE mmol/L 103 103   CO2 mmol/L 27.0 24.0   BUN mg/dL 17 18   CREATININE mg/dL 0.97 1.10   CALCIUM mg/dL 8.3* 8.6   BILIRUBIN mg/dL 0.5 0.5   ALK PHOS U/L 46 49   ALT (SGPT) U/L 35 39   AST (SGOT) U/L 29 33   GLUCOSE mg/dL 99 117*       Culture Data:   No results found for: BLOODCX, URINECX, WOUNDCX, MRSACX, RESPCX, STOOLCX    Radiology Data:   Imaging Results (Last 24 Hours)     Procedure Component Value Units Date/Time    XR Chest AP [[id number]] Collected: 01/15/22 0741     Updated: 01/15/22 0748    Narrative:      EXAMINATION: Chest 1 view 1/14/2022     HISTORY: Cough and fever. Covid evaluation.     FINDINGS: Upright frontal projection of the chest is compared to prior  exam 11/28/2021. Lungs are hypoventilated causing accentuation of the  bronchovascular markings and cardiac silhouette. There is a granuloma  within the left lung apex. There is no evidence of lobar pneumonia or  effusion.       Impression:      1.. Expiratory chest. No acute disease.  This report was finalized on 01/15/2022 07:45 by Dr. Jovan Braden MD.          I have reviewed the patient's current medications.     Assessment/Plan     Active Hospital Problems    Diagnosis    • Hypoxemia    • Obesity (BMI 30-39.9)    • Tobacco dependence    • COVID-19      Plan:  1.  Continue treatment with dexamethasone  2.  I turned  patient down from 2 L by nasal cannula to 1 L by nasal cannula during my assessment, and he maintained O2 saturations between 94-98%.  We will see if we can continue to wean further to room air.  3.  Add incentive spirometry  4.  We discussed remdesivir; plan to continue to monitor for any evolving symptoms before instituting this treatment at this time.  I did tell patient that if his symptoms began only a few days ago that the earlier we begin treatment the better.  5.  Albuterol HFA  6.  Vitamin C, Pepcid, zinc sulfate, and thiamine  7.  Lovenox prophylaxis  8.  CRP, procalcitonin, ferritin are currently stable.  Other labs reviewed and also stable.  Chest x-ray reviewed with no acute infiltrates.  9.  Patient states that he received the M[employer] M[employer] vaccine a number of months ago.        Electronically signed by Deshaun GRAHAM. MD [place], 01/15/22, 18:02 CST.    Electronically signed by Deshaun Paige MD at 01/15/22 1807       Consult Notes (last 72 hours)  Notes from 01/14/22 1251 through 01/17/22 1251   No notes of this type exist for this encounter.            Discharge Summary      Deshaun Paige MD at 01/16/22 1624              [facility] Medicine Services  DISCHARGE SUMMARY       Date of Admission: 1/14/2022  Date of Discharge:  1/16/2022  Primary Care Physician: Provider, No Known    Presenting Problem/History of Present Illness:  COVID-19 [U07.1]     Final Discharge Diagnoses:  Active Hospital Problems    Diagnosis    • Hypoxemia    • Obesity (BMI 30-39.9)    • Tobacco dependence    • COVID-19        Procedures Performed:   Imaging Results (Last 7 Days)     Procedure Component Value Units Date/Time    XR Chest AP [[id number]] Collected: 01/15/22 0741     Updated: 01/15/22 0748    Narrative:      EXAMINATION: Chest 1 view 1/14/2022     HISTORY: Cough and fever. Covid evaluation.     FINDINGS: Upright frontal projection of the chest is compared to  prior  exam 11/28/2021. Lungs are hypoventilated causing accentuation of the  bronchovascular markings and cardiac silhouette. There is a granuloma  within the left lung apex. There is no evidence of lobar pneumonia or  effusion.       Impression:      1.. Expiratory chest. No acute disease.  This report was finalized on 01/15/2022 07:45 by Dr. Jovan Braden MD.          Pertinent Test Results:   Lab Results (last 72 hours)     Procedure Component Value Units Date/Time    Comprehensive Metabolic Panel [[id number]]  (Abnormal) Collected: 01/15/22 0441    Specimen: Blood Updated: 01/15/22 0541     Glucose 99 mg/dL      BUN 17 mg/dL      Creatinine 0.97 mg/dL      Sodium 138 mmol/L      Potassium 4.2 mmol/L      Chloride 103 mmol/L      CO2 27.0 mmol/L      Calcium 8.3 mg/dL      Total Protein 6.6 g/dL      Albumin 4.20 g/dL      ALT (SGPT) 35 U/L      AST (SGOT) 29 U/L      Alkaline Phosphatase 46 U/L      Total Bilirubin 0.5 mg/dL      eGFR Non African Amer 85 mL/min/1.73      Globulin 2.4 gm/dL      A/G Ratio 1.8 g/dL      BUN/Creatinine Ratio 17.5     Anion Gap 8.0 mmol/L     Narrative:      GFR Normal >60  Chronic Kidney Disease <60  Kidney Failure <15      Troponin [[id number]]  (Normal) Collected: 01/15/22 0441    Specimen: Blood Updated: 01/15/22 0541     Troponin T <0.010 ng/mL     Narrative:      Troponin T Reference Range:  <= 0.03 ng/mL-   Negative for AMI  >0.03 ng/mL-     Abnormal for myocardial necrosis.  Clinicians would have to utilize clinical acumen, EKG, Troponin and serial changes to determine if it is an Acute Myocardial Infarction or myocardial injury due to an underlying chronic condition.       Results may be falsely decreased if patient taking Biotin.      Ferritin [[id number]]  (Normal) Collected: 01/15/22 0441    Specimen: Blood Updated: 01/15/22 0541     Ferritin 199.50 ng/mL     Narrative:      Results may be falsely decreased if patient taking Biotin.      CBC Auto Differential  [[id number]]  (Abnormal) Collected: 01/15/22 0441    Specimen: Blood Updated: 01/15/22 0519     WBC 7.60 10*3/mm3      RBC 5.13 10*6/mm3      Hemoglobin 15.3 g/dL      Hematocrit 45.3 %      MCV 88.3 fL      MCH 29.8 pg      MCHC 33.8 g/dL      RDW 13.3 %      RDW-SD 43.5 fl      MPV 13.3 fL      Platelets 125 10*3/mm3      Neutrophil % 73.2 %      Lymphocyte % 6.6 %      Monocyte % 19.1 %      Eosinophil % 0.3 %      Basophil % 0.3 %      Immature Grans % 0.5 %      Neutrophils, Absolute 5.57 10*3/mm3      Lymphocytes, Absolute 0.50 10*3/mm3      Monocytes, Absolute 1.45 10*3/mm3      Eosinophils, Absolute 0.02 10*3/mm3      Basophils, Absolute 0.02 10*3/mm3      Immature Grans, Absolute 0.04 10*3/mm3      nRBC 0.0 /100 WBC     Comprehensive Metabolic Panel [[id number]]  (Abnormal) Collected: 01/14/22 2155    Specimen: Blood Updated: 01/14/22 2254     Glucose 117 mg/dL      BUN 18 mg/dL      Creatinine 1.10 mg/dL      Sodium 138 mmol/L      Potassium 4.3 mmol/L      Chloride 103 mmol/L      CO2 24.0 mmol/L      Calcium 8.6 mg/dL      Total Protein 7.1 g/dL      Albumin 4.40 g/dL      ALT (SGPT) 39 U/L      AST (SGOT) 33 U/L      Alkaline Phosphatase 49 U/L      Total Bilirubin 0.5 mg/dL      eGFR Non African Amer 74 mL/min/1.73      Globulin 2.7 gm/dL      A/G Ratio 1.6 g/dL      BUN/Creatinine Ratio 16.4     Anion Gap 11.0 mmol/L     Narrative:      GFR Normal >60  Chronic Kidney Disease <60  Kidney Failure <15      C-reactive Protein [[id number]]  (Abnormal) Collected: 01/14/22 2155    Specimen: Blood Updated: 01/14/22 2253     C-Reactive Protein 0.54 mg/dL     Lactate Dehydrogenase [[id number]]  (Abnormal) Collected: 01/14/22 2155    Specimen: Blood Updated: 01/14/22 2252      U/L      Comment: Specimen hemolyzed.  Results may be affected.       Procalcitonin [[id number]]  (Normal) Collected: 01/14/22 2155    Specimen: Blood Updated: 01/14/22 2241     Procalcitonin 0.14 ng/mL     Narrative:      As a Marker  "for Sepsis (Non-Neonates):     1. <0.5 ng/mL represents a low risk of severe sepsis and/or septic shock.  2. >2 ng/mL represents a high risk of severe sepsis and/or septic shock.    As a Marker for Lower Respiratory Tract Infections that require antibiotic therapy:  PCT on Admission     Antibiotic Therapy             6-12 Hrs later  >0.5                          Strongly Recommended            >0.25 - <0.5             Recommended  0.1 - 0.25                  Discouraged                       Remeasure/reassess PCT  <0.1                         Strongly Discouraged         Remeasure/reassess PCT      As 28 day mortality risk marker: \"Change in Procalcitonin Result\" (>80% or <=80%) if Day 0 (or Day 1) and Day 4 values are available. Refer to http://www.[facility]ah[facility] Systemspct-calculator.com/    Change in PCT <=80 %   A decrease of PCT levels below or equal to 80% defines a positive change in PCT test result representing a higher risk for 28-day all-cause mortality of patients diagnosed with severe sepsis or septic shock.    Change in PCT >80 %   A decrease of PCT levels of more than 80% defines a negative change in PCT result representing a lower risk for 28-day all-cause mortality of patients diagnosed with severe sepsis or septic shock.                Lactic Acid, Plasma [[id number]]  (Normal) Collected: 01/14/22 2155    Specimen: Blood Updated: 01/14/22 2233     Lactate 1.7 mmol/L     D-dimer, Quantitative [[id number]]  (Normal) Collected: 01/14/22 2155    Specimen: Blood Updated: 01/14/22 2225     D-Dimer, Quantitative 0.32 mg/L (FEU)     Narrative:      Reference Range is 0-0.50 mg/L FEU. However, results <0.50 mg/L FEU tends to rule out DVT or PE. Results >0.50 mg/L FEU are not useful in predicting absence or presence of DVT or PE.      CBC & Differential [[id number]]  (Abnormal) Collected: 01/14/22 2155    Specimen: Blood Updated: 01/14/22 2220    Narrative:      The following orders were created for panel order CBC & " Differential.  Procedure                               Abnormality         Status                     ---------                               -----------         ------                     CBC Auto Differential[[id number]]        Abnormal            Final result                 Please view results for these tests on the individual orders.    CBC Auto Differential [[id number]]  (Abnormal) Collected: 01/14/22 2155    Specimen: Blood Updated: 01/14/22 2220     WBC 11.31 10*3/mm3      RBC 5.39 10*6/mm3      Hemoglobin 15.6 g/dL      Hematocrit 47.3 %      MCV 87.8 fL      MCH 28.9 pg      MCHC 33.0 g/dL      RDW 13.3 %      RDW-SD 43.0 fl      MPV 13.1 fL      Platelets 140 10*3/mm3      Neutrophil % 81.6 %      Lymphocyte % 2.8 %      Monocyte % 14.2 %      Eosinophil % 0.4 %      Basophil % 0.3 %      Immature Grans % 0.7 %      Neutrophils, Absolute 9.23 10*3/mm3      Lymphocytes, Absolute 0.32 10*3/mm3      Monocytes, Absolute 1.61 10*3/mm3      Eosinophils, Absolute 0.04 10*3/mm3      Basophils, Absolute 0.03 10*3/mm3      Immature Grans, Absolute 0.08 10*3/mm3      nRBC 0.0 /100 WBC     Blood Gas, Arterial - [[id number]]  (Abnormal) Collected: 01/14/22 2150    Specimen: Arterial Blood Updated: 01/14/22 2158     Site Right Brachial     Carlos's Test N/A     pH, Arterial 7.465 pH units      Comment: 83 Value above reference range        pCO2, Arterial 35.1 mm Hg      pO2, Arterial 63.3 mm Hg      Comment: 84 Value below reference range        HCO3, Arterial 25.2 mmol/L      Base Excess, Arterial 1.9 mmol/L      O2 Saturation, Arterial 94.8 %      Temperature 37.0 C      Barometric Pressure for Blood Gas 752 mmHg      Modality Room Air     Ventilator Mode NA     Collected by [id number]     Comment: Meter: [id number]     :  [email or web address]        pCO2, Temperature Corrected 35.1 mm Hg      pH, Temp Corrected 7.465 pH Units      pO2, Temperature Corrected 63.3 mm Hg     COVID PRE-OP / PRE-PROCEDURE SCREENING ORDER (NO  "ISOLATION) - Swab, Nasal Cavity [[id number]]  (Abnormal) Collected: 01/14/22 1824    Specimen: Swab from Nasal Cavity Updated: 01/14/22 1924    Narrative:      The following orders were created for panel order COVID PRE-OP / PRE-PROCEDURE SCREENING ORDER (NO ISOLATION) - Swab, Nasal Cavity.  Procedure                               Abnormality         Status                     ---------                               -----------         ------                     COVID-19,[facility]...[[id number]]  Abnormal            Final result                 Please view results for these tests on the individual orders.    COVID-19,[facility] IN-HOUSE,Nasal Swab No Transport Media 3-4 HR TAT - Swab, Nasal Cavity [[id number]]  (Abnormal) Collected: 01/14/22 1824    Specimen: Swab from Nasal Cavity Updated: 01/14/22 1924     COVID19 Detected    Narrative:      Fact sheet for providers: https://www.fda.gov/media/186135/download     Fact sheet for patients: https://www.fda.gov/media/276220/download    Test performed by PCR.    Consider negative results in combination with clinical observations, patient history, and epidemiological information.            Chief Complaint on Day of Discharge: \"I'm ready to go home\" nursing messaged me this afternoon stating the patient was very eager to get home.    Hospital Course:  The patient is a 41 y.o. male who presented to [facility] with COVID symptoms.  Patient stated that he began having symptoms of COVID-19 on Wednesday night (1/12).  His primary system was generalized aches and pains, particularly in his lower extremities and hips, in addition to an ongoing headache.  Patient informed me that he had not noticed any significant dyspnea symptoms, in addition to not having a significant cough.  He did report having fever and flulike symptoms.  In the emergency department he was found to have O2 saturations of 84% on room air.  He was admitted to the hospitalist service for further " work-up and management.    Patient informed me that he did receive the [employer] vaccine a number of months ago- he could not give me a specific month.  He also reported to me that he has a sleep study arranged on an outpatient basis in the near future for suspected sleep apnea.    He was started on treatment with dexamethasone.  We discussed treatment with remdesivir however patient wanted to continue to monitor for any evolving symptoms before instituting this treatment.  He was provided an albuterol HFA, incentive spirometer, and started on adjunctive treatments including vitamin C, zinc sulfate, and thiamine.    His CRP, procalcitonin, and ferritin levels have been stable.  His chest x-ray did not reveal any acute infiltrates.    Patient was initially on 2 L by nasal cannula, and when I saw him yesterday I turned him down to 1 L by nasal cannula.  Patient reports this morning he took himself off of oxygen, and states that he has been maintaining O2 saturations of 94% or above throughout the day, and is reporting eagerness and readiness for discharge home.  I am going to prescribe him 7 days of dexamethasone.  I will give him a prescription for an albuterol inhaler, in addition to vitamin C and zinc sulfate.    I did recommend that he purchase a pulse oximeter at a local pharmacy to monitor his O2 saturations closely, especially as he states that his symptoms only began approximately 4-5 days ago.  I informed him that if he begins having hypoxemia with O2 saturations of less than 90% to return to our facility for further assessment.  Patient states that he feels much better, denies any new shortness of breath or respiratory symptoms, O2 saturations remained stable on room air throughout the day, and plan will be for discharge home this afternoon and transition to the outpatient setting.    Condition on Discharge:  Medically stable    Physical Exam on Discharge:  /76 (BP Location: Right arm, Patient  "Position: Sitting)   Pulse 72   Temp 97.2 °F (36.2 °C) (Oral)   Resp 16   Ht 190.5 cm (75\")   Wt (!) 139 kg (306 lb 3.2 oz)   SpO2 94%   BMI 38.27 kg/m²   Physical Exam  Vitals reviewed.   Constitutional:       Appearance: He is not ill-appearing or toxic-appearing.   HENT:      Head: Normocephalic.      Mouth/Throat:      Pharynx: No oropharyngeal exudate.   Eyes:      Pupils: Pupils are equal, round, and reactive to light.   Cardiovascular:      Rate and Rhythm: Normal rate.   Pulmonary:      Effort: Pulmonary effort is normal. No respiratory distress.      Breath sounds: No wheezing or rales.      Comments: On room air; no work of breathing.  No accessory muscle use.  Musculoskeletal:         General: No swelling.      Cervical back: Neck supple.   Neurological:      General: No focal deficit present.      Mental Status: He is alert.   Psychiatric:         Mood and Affect: Mood normal.       Discharge Disposition:  Home or Self Care    Discharge Medications:     Discharge Medications      New Medications      Instructions Start Date   albuterol sulfate  (90 Base) MCG/ACT inhaler  Commonly known as: PROVENTIL HFA;VENTOLIN HFA;PROAIR HFA   2 puffs, Inhalation, 4 Times Daily - RT      ascorbic acid 500 MG tablet  Commonly known as: VITAMIN C   500 mg, Oral, Daily   Start Date: January 17, 2022     dexamethasone 6 MG tablet  Commonly known as: DECADRON   6 mg, Oral, Daily With Breakfast   Start Date: January 17, 2022     famotidine 20 MG tablet  Commonly known as: PEPCID   20 mg, Oral, 2 Times Daily Before Meals      zinc sulfate 220 (50 Zn) MG capsule  Commonly known as: ZINCATE   220 mg, Oral, Daily   Start Date: January 17, 2022        Continue These Medications      Instructions Start Date   diclofenac 75 MG EC tablet  Commonly known as: VOLTAREN   75 mg, Oral, 2 Times Daily      tiZANidine 4 MG tablet  Commonly known as: ZANAFLEX   4 mg, Oral, 3 Times Daily PRN, Take 1 - 2 tablets       traZODone " 100 MG tablet  Commonly known as: DESYREL   100 mg, Oral, Nightly             Discharge Diet: as tolerated    Activity at Discharge: as tolerated    Discharge Care Plan/Instructions:   We discussed obtaining a home pulse oximeter to monitor his O2 saturations on an outpatient basis.  He will take the incentive spirometer home for continued use.  He needs to stop smoking!    Follow-up Appointments:   1 week follow-up with primary care provider, telehealth if possible.    Test Results Pending at Discharge: none    Electronically signed by Deshaun Paige MD, 01/16/22, 16:24 CST.    Time: 25 min        Electronically signed by Deshaun Paige MD at 01/16/22 5432

## 2022-01-17 NOTE — OUTREACH NOTE
COVID-19 Week 1 Survey      Responses   [facility] patient discharged from? [place]   Does the patient have one of the following disease processes/diagnoses(primary or secondary)? COVID-19   COVID-19 underlying condition? None   Call Number Call 1   Week 1 Call successful? Yes   Call start time 1346   Call end time 1355   Is patient permission given to speak with other caregiver? Yes   List who call center can speak with [place]   Meds reviewed with patient/caregiver? Yes   Is the patient having any side effects they believe may be caused by any medication additions or changes? No   Does the patient have all medications ordered at discharge? Yes   Is the patient taking all medications as directed (includes completed medication regime)? Yes   Comments regarding appointments PCP appt 01/21/22.   Does the patient have a primary care provider?  Yes   Comments regarding PCP [facility]   Does the patient have an appointment with their PCP or specialist within 7 days of discharge? Yes   Has the patient kept scheduled appointments due by today? N/A   Has home health visited the patient within 72 hours of discharge? N/A   Psychosocial issues? No   Did the patient receive a copy of their discharge instructions? Yes   Did the patient receive a copy of COVID-19 specific instructions? Yes   Nursing interventions Reviewed instructions with patient   What is the patient's perception of their health status since discharge? Improving   Does the patient have any of the following symptoms? Cough,  Shortness of breath  [Body aches.]   Nursing Interventions Nurse provided patient education   Pulse Ox monitoring Intermittent   Pulse Ox device source Patient   O2 Sat comments 94-98% on RA.   O2 Sat: education provided Sat levels,  Monitoring frequency,  When to seek care   O2 Sat education comments States was advised to return to ER if O2 sats remain below 90%.   Is the patient/caregiver able to teach back steps to  recovery at home? Set small, achievable goals for return to baseline health,  Eat a well-balance diet,  Rest and rebuild strength, gradually increase activity   If the patient is a current smoker, are they able to teach back resources for cessation? Smoking cessation medications,  Smoking cessation classes,  Smoking cessation support groups,  7-529-[place]   Is the patient/caregiver able to teach back the hierarchy of who to call/visit for symptoms/problems? PCP, Specialist, Home health nurse, Urgent Care, ED, 911 Yes   COVID-19 call completed? Yes   Wrap up additional comments States is improving. States continues with cough, SOA on exertion, body aches. States will quarantine until 01/20/22. Encouraged to monitor temp, do deep breathing exercises, replace toothbrush/paste/razor. Denies any needs today.          Barbara Saleh, RN

## 2022-01-17 NOTE — OUTREACH NOTE
Prep Survey      Responses   [facility] patient discharged from? [place]   Is LACE score < 7 ? Yes   Emergency Room discharge w/ pulse ox? No   Eligibility Readm Mgmt   Discharge diagnosis Hypoxia, COVID-19, Tobacco dependence   Does the patient have one of the following disease processes/diagnoses(primary or secondary)? COVID-19   Does the patient have Home health ordered? No   Is there a DME ordered? No   Prep survey completed? Yes          Veena Hernandez RN

## 2022-01-18 ENCOUNTER — READMISSION MANAGEMENT (OUTPATIENT)
Dept: CALL CENTER | Facility: HOSPITAL | Age: 42
End: 2022-01-18

## 2022-01-18 NOTE — OUTREACH NOTE
COVID-19 Week 1 Survey      Responses   [facility] patient discharged from? [place]   Does the patient have one of the following disease processes/diagnoses(primary or secondary)? COVID-19   COVID-19 underlying condition? None   Call Number Call 2   Call end time 1241   Discharge diagnosis COVID-19   What is the patient's perception of their health status since discharge? --  [started  he is doing good]   Pulse Ox monitoring Intermittent   O2 Sat comments 96-98% pulse ox on room air   COVID-19 call completed? Yes   Wrap up additional comments stated i am doing good          Haylie Acosta RN

## 2022-01-19 ENCOUNTER — READMISSION MANAGEMENT (OUTPATIENT)
Dept: CALL CENTER | Facility: HOSPITAL | Age: 42
End: 2022-01-19

## 2022-01-19 NOTE — OUTREACH NOTE
COVID-19 Week 1 Survey      Responses   [facility] patient discharged from? [place]   Does the patient have one of the following disease processes/diagnoses(primary or secondary)? COVID-19   COVID-19 underlying condition? None   Call Number Call 3   Week 1 Call successful? Yes   Call start time 1449   Call end time 1453   Discharge diagnosis COVID-19   Meds reviewed with patient/caregiver? Yes   Is the patient having any side effects they believe may be caused by any medication additions or changes? No   Does the patient have all medications ordered at discharge? Yes   Is the patient taking all medications as directed (includes completed medication regime)? Yes   Comments regarding appointments PCP appt rescheduled for 1/28/22   Does the patient have a primary care provider?  Yes   Does the patient have an appointment with their PCP or specialist within 7 days of discharge? Yes   Has the patient kept scheduled appointments due by today? N/A   Has home health visited the patient within 72 hours of discharge? N/A   Psychosocial issues? No   Did the patient receive a copy of their discharge instructions? Yes   Did the patient receive a copy of COVID-19 specific instructions? Yes   Nursing interventions Reviewed instructions with patient   What is the patient's perception of their health status since discharge? Improving   Does the patient have any of the following symptoms? Cough   Nursing Interventions Nurse provided patient education   Pulse Ox monitoring Intermittent   Pulse Ox device source Patient   O2 Sat comments 96-98% on RA   O2 Sat: education provided Sat levels,  Monitoring frequency,  When to seek care   Is the patient/caregiver able to teach back steps to recovery at home? Set small, achievable goals for return to baseline health,  Rest and rebuild strength, gradually increase activity,  Eat a well-balance diet   Is the patient/caregiver able to teach back the hierarchy of who to call/visit for  symptoms/problems? PCP, Specialist, Home health nurse, Urgent Care, ED, 911 Yes   COVID-19 call completed? Yes          Vianney Edwards RN

## 2022-01-24 ENCOUNTER — READMISSION MANAGEMENT (OUTPATIENT)
Dept: CALL CENTER | Facility: HOSPITAL | Age: 42
End: 2022-01-24

## 2022-01-24 NOTE — OUTREACH NOTE
"COVID-19 Week 2 Survey      Responses   [facility] patient discharged from? [place]   Does the patient have one of the following disease processes/diagnoses(primary or secondary)? COVID-19   COVID-19 underlying condition? None   Call Number Call 1   COVID-19 Week 2: Call 1 attempt successful? Yes   Call start time 1327   Call end time 1329   Discharge diagnosis COVID-19   Person spoke with today (if not patient) and relationship Carey-friend    Meds reviewed with patient/caregiver? Yes   Is the patient taking all medications as directed (includes completed medication regime)? Yes   Comments regarding PCP [facility]   Has the patient kept scheduled appointments due by today? N/A   What is the patient's perception of their health status since discharge? Returned to baseline/stable   Does the patient have any of the following symptoms? Cough  [Pt gets tired easily]   Pulse Ox monitoring Intermittent   O2 Sat comments unsure   Is the patient/caregiver able to teach back steps to recovery at home? Rest and rebuild strength, gradually increase activity,  Eat a well-balance diet   COVID-19 call completed? Yes   Revoked No further contact(revokes)-requires comment   Is the patient interested in additional calls from an ambulatory ?  NOTE:  applies to high risk patients requiring additional follow-up. No   Graduated/Revoked comments Carey reports, \"I think he's doing fine now\" when asked about another call          Alena Vann RN  "

## 2022-01-28 ENCOUNTER — HOSPITAL ENCOUNTER (OUTPATIENT)
Dept: SLEEP CENTER | Age: 42
Discharge: HOME | End: 2022-01-30
Payer: COMMERCIAL

## 2022-01-28 PROCEDURE — G0399 HOME SLEEP TEST/TYPE 3 PORTA: HCPCS

## 2022-01-29 PROCEDURE — G0399 HOME SLEEP TEST/TYPE 3 PORTA: HCPCS

## 2022-02-13 DIAGNOSIS — G47.33 OSA (OBSTRUCTIVE SLEEP APNEA): Primary | ICD-10-CM

## 2022-02-13 PROCEDURE — 95806 SLEEP STUDY UNATT&RESP EFFT: CPT | Performed by: INTERNAL MEDICINE

## 2022-02-14 NOTE — PROCEDURES
Patient Name Heydi Braswell Account Number [de-identified]    1980 Referring Provider CHARI Garces   Age/ Gender 39 years/M Interpreting physician Irina Cantor M.D., [facility]   Neck circumference Unavailable Device Stardust II   Mallampati classification Unavailable Scoring Technician Yesy De La Rosa, CRT, RPSGT   [place] score  Indications for the test excessive daytime somnolence   Height 75.0 in Test Home Sleep Apnea Test   Weight 290.0 lbs Date of test 2022   BMI 36.2 Time in Bed (TIB) 267.8 minutes     Events   Index (#/hr) Total # Events Mean Duration (sec) Max Duration (sec) Supine                # Index   Central Apneas 0.0 0 0.0 0.0 0 0.0   Obstructive Apneas 6.7 30 16.1 32.5 24 18.3   Mixed Apneas 0.0 0 0.0 0.0 0 0.0   Hypopneas 10.3 46 20.5 54.0 26 19.8   Estimated AHI  #events/TIB (hrs) 17.0 76 18.7 54.0 38.1   Time in Position (minutes) 78.8     Snoring  Snoring Rating (loudness 0-4) 1   Snoring Amount (% of total sleep time with snoring) 57.4     Oximetry distribution  <90 %  (minutes) 110.6   <85 %  (minutes) 56.2   <80 %  (minutes) 10.1   <75 %  (minutes) 5.1   <70 %  (minutes) 0.0   <60 %  (minutes) 0.0   <50 %  (minutes) 0.0   Average (%) 89   Desat Index (#/hour) 23.0   Desat Max (%) 14   Desat Max dur (sec) 102.5   Lowest SpO2 (³ 2 sec) (%) 72     Heart Rate  Mean HR (BPM) 77.1   # of LHR 8   LHR min (BPM) 57   # of HHR 1   HHR max (BPM) 86       Interpretation:     1. Moderate obstructive sleep apnea. 2. Obesity. 3. Subjective hypersomnia. 4. Hypoxia during sleep. Recommendations:    1. Consider Auto CPAP to titrate between 8cm water pressure and 20cm water pressure. 2. Weight loss and exercise. 3. Avoid risky activity such as driving if sleepy. 4. Discuss sleep hygiene with the patient. 5. Monitor PAP use and effectiveness.        Meka Rosas MD, [facility], [facility]

## 2022-02-14 NOTE — PROCEDURES
Patient Name Candice Freeman Account Number [de-identified]    1980 Referring Provider CHARI Caldera   Age/ Gender 39 years/M Interpreting physician Denis Rivera M.D., [facility]   Neck circumference Unavailable Device Stardust II   Mallampati classification Unavailable Scoring Technician Deuce Weldon, CRT, RPSGT   [place] score  Indications for the test excessive daytime somnolence   Height 75.0 in Test Home Sleep Apnea Test   Weight 290.0 lbs Date of test 2022   BMI 36.2 Time in Bed (TIB) 143.9 minutes     Events   Index (#/hr) Total # Events Mean Duration (sec) Max Duration (sec) Supine                # Index   Central Apneas 0.0 0 0.0 0.0 0 0.0   Obstructive Apneas 25.0 60 19.7 37.0 59 37.2   Mixed Apneas 0.0 0 0.0 0.0 0 0.0   Hypopneas 15.0 36 15.5 31.5 32 20.2   Estimated AHI  #events/TIB (hrs) 40.0 96 18.1 37.0 57.4   Time in Position (minutes) 95.1     Snoring  Snoring Rating (loudness 0-4) 1   Snoring Amount (% of total sleep time with snoring) 70.1     Oximetry distribution  <90 %  (minutes) 68.4   <85 %  (minutes) 4.5   <80 %  (minutes) 0.1   <75 %  (minutes) 0.0   <70 %  (minutes) 0.0   <60 %  (minutes) 0.0   <50 %  (minutes) 0.0   Average (%) 90   Desat Index (#/hour) 55.1   Desat Max (%) 16   Desat Max dur (sec) 40.0   Lowest SpO2 (³ 2 sec) (%) 75     Heart Rate  Mean HR (BPM) 82.0   # of LHR 3   LHR min (BPM) 65   # of HHR 3   HHR max (BPM) 105     Interpretation:     1. Severe obstructive sleep apnea. 2. Obesity. 3. Subjective hypersomnia. 4. Hypoxia during sleep. Recommendations:    1. Consider Auto CPAP to titrate between 8cm water pressure and 20cm water pressure. 2. Weight loss and exercise. 3. Avoid risky activity such as driving if sleepy. 4. Discuss sleep hygiene with the patient. 5. Monitor PAP use and effectiveness.        Meka Rosas MD, [facility], [facility]

## 2022-04-10 ENCOUNTER — HOSPITAL ENCOUNTER (EMERGENCY)
Facility: HOSPITAL | Age: 42
Discharge: HOME | End: 2022-04-10
Attending: EMERGENCY MEDICINE | Admitting: EMERGENCY MEDICINE

## 2022-04-10 VITALS
HEIGHT: 76 IN | RESPIRATION RATE: 18 BRPM | WEIGHT: 315 LBS | HEART RATE: 101 BPM | DIASTOLIC BLOOD PRESSURE: 93 MMHG | OXYGEN SATURATION: 97 % | BODY MASS INDEX: 38.36 KG/M2 | TEMPERATURE: 98 F | SYSTOLIC BLOOD PRESSURE: 140 MMHG

## 2022-04-10 DIAGNOSIS — R20.8 DYSESTHESIA: ICD-10-CM

## 2022-04-10 DIAGNOSIS — M79.89 SWELLING OF BOTH HANDS: Primary | ICD-10-CM

## 2022-04-10 LAB
ALBUMIN SERPL-MCNC: 4.1 G/DL (ref 3.5–5.2)
ALBUMIN/GLOB SERPL: 1.5 G/DL
ALP SERPL-CCNC: 52 U/L (ref 39–117)
ALT SERPL W P-5'-P-CCNC: 32 U/L (ref 1–41)
AMPHET+METHAMPHET UR QL: NEGATIVE
AMPHETAMINES UR QL: NEGATIVE
ANION GAP SERPL CALCULATED.3IONS-SCNC: 11 MMOL/L (ref 5–15)
AST SERPL-CCNC: 27 U/L (ref 1–40)
BARBITURATES UR QL SCN: NEGATIVE
BENZODIAZ UR QL SCN: NEGATIVE
BILIRUB SERPL-MCNC: 0.2 MG/DL (ref 0–1.2)
BILIRUB UR QL STRIP: NEGATIVE
BUN SERPL-MCNC: 15 MG/DL (ref 6–20)
BUN/CREAT SERPL: 17.6 (ref 7–25)
BUPRENORPHINE SERPL-MCNC: NEGATIVE NG/ML
CALCIUM SPEC-SCNC: 8.7 MG/DL (ref 8.6–10.5)
CANNABINOIDS SERPL QL: NEGATIVE
CHLORIDE SERPL-SCNC: 105 MMOL/L (ref 98–107)
CLARITY UR: CLEAR
CO2 SERPL-SCNC: 23 MMOL/L (ref 22–29)
COCAINE UR QL: NEGATIVE
COLOR UR: YELLOW
CREAT SERPL-MCNC: 0.85 MG/DL (ref 0.76–1.27)
DEPRECATED RDW RBC AUTO: 42.8 FL (ref 37–54)
EGFRCR SERPLBLD CKD-EPI 2021: 112 ML/MIN/1.73
EOSINOPHIL # BLD MANUAL: 0.34 10*3/MM3 (ref 0–0.4)
EOSINOPHIL NFR BLD MANUAL: 3 % (ref 0.3–6.2)
ERYTHROCYTE [DISTWIDTH] IN BLOOD BY AUTOMATED COUNT: 13.3 % (ref 12.3–15.4)
GIANT PLATELETS: ABNORMAL
GLOBULIN UR ELPH-MCNC: 2.7 GM/DL
GLUCOSE SERPL-MCNC: 104 MG/DL (ref 65–99)
GLUCOSE UR STRIP-MCNC: NEGATIVE MG/DL
HCT VFR BLD AUTO: 46 % (ref 37.5–51)
HGB BLD-MCNC: 16 G/DL (ref 13–17.7)
HGB UR QL STRIP.AUTO: NEGATIVE
KETONES UR QL STRIP: NEGATIVE
LEUKOCYTE ESTERASE UR QL STRIP.AUTO: NEGATIVE
LYMPHOCYTES # BLD MANUAL: 2.15 10*3/MM3 (ref 0.7–3.1)
LYMPHOCYTES NFR BLD MANUAL: 8 % (ref 5–12)
MCH RBC QN AUTO: 30.3 PG (ref 26.6–33)
MCHC RBC AUTO-ENTMCNC: 34.8 G/DL (ref 31.5–35.7)
MCV RBC AUTO: 87.1 FL (ref 79–97)
METHADONE UR QL SCN: NEGATIVE
MONOCYTES # BLD: 0.9 10*3/MM3 (ref 0.1–0.9)
NEUTROPHILS # BLD AUTO: 7.92 10*3/MM3 (ref 1.7–7)
NEUTROPHILS NFR BLD MANUAL: 70 % (ref 42.7–76)
NEUTS VAC BLD QL SMEAR: ABNORMAL
NITRITE UR QL STRIP: NEGATIVE
NRBC BLD AUTO-RTO: 0 /100 WBC (ref 0–0.2)
OPIATES UR QL: NEGATIVE
OXYCODONE UR QL SCN: NEGATIVE
PCP UR QL SCN: NEGATIVE
PH UR STRIP.AUTO: 6.5 [PH] (ref 5–8)
PLATELET # BLD AUTO: 150 10*3/MM3 (ref 140–450)
PMV BLD AUTO: 12.7 FL (ref 6–12)
POTASSIUM SERPL-SCNC: 4.2 MMOL/L (ref 3.5–5.2)
PROPOXYPH UR QL: NEGATIVE
PROT SERPL-MCNC: 6.8 G/DL (ref 6–8.5)
PROT UR QL STRIP: NEGATIVE
RBC # BLD AUTO: 5.28 10*6/MM3 (ref 4.14–5.8)
RBC MORPH BLD: NORMAL
SODIUM SERPL-SCNC: 139 MMOL/L (ref 136–145)
SP GR UR STRIP: 1.02 (ref 1–1.03)
TRICYCLICS UR QL SCN: NEGATIVE
UROBILINOGEN UR QL STRIP: NORMAL
VARIANT LYMPHS NFR BLD MANUAL: 16 % (ref 19.6–45.3)
VARIANT LYMPHS NFR BLD MANUAL: 3 % (ref 0–5)
WBC NRBC COR # BLD: 11.31 10*3/MM3 (ref 3.4–10.8)

## 2022-04-10 PROCEDURE — 85025 COMPLETE CBC W/AUTO DIFF WBC: CPT | Performed by: EMERGENCY MEDICINE

## 2022-04-10 PROCEDURE — 80306 DRUG TEST PRSMV INSTRMNT: CPT | Performed by: EMERGENCY MEDICINE

## 2022-04-10 PROCEDURE — 36415 COLL VENOUS BLD VENIPUNCTURE: CPT

## 2022-04-10 PROCEDURE — 99283 EMERGENCY DEPT VISIT LOW MDM: CPT

## 2022-04-10 PROCEDURE — 81003 URINALYSIS AUTO W/O SCOPE: CPT | Performed by: EMERGENCY MEDICINE

## 2022-04-10 PROCEDURE — 85007 BL SMEAR W/DIFF WBC COUNT: CPT | Performed by: EMERGENCY MEDICINE

## 2022-04-10 PROCEDURE — 80053 COMPREHEN METABOLIC PANEL: CPT | Performed by: EMERGENCY MEDICINE

## 2022-04-10 RX ORDER — SODIUM CHLORIDE 0.9 % (FLUSH) 0.9 %
10 SYRINGE (ML) INJECTION AS NEEDED
Status: DISCONTINUED | OUTPATIENT
Start: 2022-04-10 | End: 2022-04-10 | Stop reason: HOSPADM

## 2022-04-11 NOTE — ED PROVIDER NOTES
Subjective   Patient presents to the ER with complaints of bilateral hand and lower extremity swelling and tingling in the left hand along the dorsum/back part of the hand to the tips of the fingers.  Patient denies chest pain, shortness of breath, nausea, vomiting, diarrhea, fever, abdominal pain and review of systems other than noted above is noncontributory.  Patient denies any injury.          Review of Systems   All other systems reviewed and are negative.      History reviewed. No pertinent past medical history.    No Known Allergies    Past Surgical History:   Procedure Laterality Date   • BACK SURGERY         History reviewed. No pertinent family history.    Social History     Socioeconomic History   • Marital status: Legally    Tobacco Use   • Smoking status: Current Every Day Smoker     Packs/day: 1.00     Types: Cigarettes   • Smokeless tobacco: Never Used           Objective   Physical Exam  Vitals and nursing note reviewed.   Constitutional:       Appearance: Normal appearance. He is obese.   HENT:      Head: Normocephalic and atraumatic.      Nose: Nose normal.   Eyes:      General:         Right eye: No discharge.         Left eye: No discharge.      Extraocular Movements: Extraocular movements intact.      Conjunctiva/sclera: Conjunctivae normal.   Cardiovascular:      Rate and Rhythm: Normal rate and regular rhythm.      Heart sounds: Normal heart sounds.   Pulmonary:      Effort: Pulmonary effort is normal.      Breath sounds: Normal breath sounds.   Abdominal:      General: Abdomen is flat. There is no distension.      Palpations: Abdomen is soft. There is no mass.      Tenderness: There is no abdominal tenderness. There is no guarding.   Musculoskeletal:         General: No swelling, tenderness, deformity or signs of injury. Normal range of motion.      Cervical back: Normal range of motion and neck supple.      Right lower leg: No edema.      Left lower leg: No edema.      Comments:  Personally do not appreciate any swelling in the hands or feet.  Patient has equal and symmetric pulses in the radial and dorsalis pedis pulses.  Cap refill is less than 2 seconds.   Skin:     General: Skin is warm and dry.      Capillary Refill: Capillary refill takes less than 2 seconds.   Neurological:      General: No focal deficit present.      Mental Status: He is alert and oriented to person, place, and time. Mental status is at baseline.      Motor: No weakness.   Psychiatric:         Mood and Affect: Mood normal.         Behavior: Behavior normal.         Thought Content: Thought content normal.         Judgment: Judgment normal.         Procedures           ED Course           Discussed test results and findings with patient and need for follow-up.  Patient was told and given instructions for return to the emergency department concerns.  Patient understood and agreed with all instructions agreed to follow-up as directed.                                      MDM  Number of Diagnoses or Management Options  Dysesthesia: new and requires workup  Swelling of both hands: new and requires workup     Amount and/or Complexity of Data Reviewed  Clinical lab tests: reviewed and ordered    Risk of Complications, Morbidity, and/or Mortality  Presenting problems: minimal  Diagnostic procedures: minimal  Management options: minimal    Patient Progress  Patient progress: improved      Final diagnoses:   Swelling of both hands   Dysesthesia       ED Disposition  ED Disposition     ED Disposition   Discharge    Condition   Good    Comment   --             Provider, No Known  [facility] [address]  [phone]    Schedule an appointment as soon as possible for a visit in 1 day      [facility] Emergency Department  [address] [place] [id number] [phone]    If symptoms worsen         Medication List      No changes were made to your prescriptions during this visit.           Danny Hinkle,   04/10/22 2039

## 2022-04-19 ENCOUNTER — TRANSCRIBE ORDERS (OUTPATIENT)
Dept: ADMINISTRATIVE | Facility: HOSPITAL | Age: 42
End: 2022-04-19

## 2022-04-19 DIAGNOSIS — R06.00 DYSPNEA, UNSPECIFIED TYPE: Primary | ICD-10-CM

## 2022-05-05 ENCOUNTER — NURSE TRIAGE (OUTPATIENT)
Dept: OTHER | Facility: CLINIC | Age: 42
End: 2022-05-05

## 2022-05-09 ENCOUNTER — OFFICE VISIT (OUTPATIENT)
Dept: PRIMARY CARE CLINIC | Age: 42
End: 2022-05-09
Payer: COMMERCIAL

## 2022-05-09 VITALS
OXYGEN SATURATION: 97 % | DIASTOLIC BLOOD PRESSURE: 78 MMHG | BODY MASS INDEX: 38.36 KG/M2 | HEART RATE: 100 BPM | SYSTOLIC BLOOD PRESSURE: 132 MMHG | TEMPERATURE: 97.2 F | WEIGHT: 315 LBS | HEIGHT: 76 IN

## 2022-05-09 DIAGNOSIS — Z76.89 ENCOUNTER TO ESTABLISH CARE: Primary | ICD-10-CM

## 2022-05-09 DIAGNOSIS — Z76.0 MEDICATION REFILL: ICD-10-CM

## 2022-05-09 DIAGNOSIS — M65.4 DE QUERVAIN'S DISEASE (RADIAL STYLOID TENOSYNOVITIS): ICD-10-CM

## 2022-05-09 PROCEDURE — 99203 OFFICE O/P NEW LOW 30 MIN: CPT | Performed by: FAMILY MEDICINE

## 2022-05-09 RX ORDER — METHYLPREDNISOLONE 4 MG/1
TABLET ORAL
Qty: 1 KIT | Refills: 0 | Status: SHIPPED | OUTPATIENT
Start: 2022-05-09 | End: 2022-05-23 | Stop reason: ALTCHOICE

## 2022-05-09 RX ORDER — NAPROXEN 500 MG/1
500 TABLET ORAL 2 TIMES DAILY PRN
Qty: 60 TABLET | Refills: 0 | Status: SHIPPED | OUTPATIENT
Start: 2022-05-09

## 2022-05-09 RX ORDER — TRAMADOL HYDROCHLORIDE 50 MG/1
50 TABLET ORAL EVERY 6 HOURS PRN
COMMUNITY

## 2022-05-09 RX ORDER — TRAZODONE HYDROCHLORIDE 150 MG/1
150 TABLET ORAL NIGHTLY
COMMUNITY

## 2022-05-09 ASSESSMENT — ENCOUNTER SYMPTOMS
EYE PAIN: 0
TROUBLE SWALLOWING: 0
COUGH: 0
CONSTIPATION: 0
SHORTNESS OF BREATH: 0
WHEEZING: 0
DIARRHEA: 0
CHEST TIGHTNESS: 0
BACK PAIN: 0
NAUSEA: 0
SORE THROAT: 0
ABDOMINAL PAIN: 0
VOMITING: 0

## 2022-05-09 ASSESSMENT — PATIENT HEALTH QUESTIONNAIRE - PHQ9
SUM OF ALL RESPONSES TO PHQ9 QUESTIONS 1 & 2: 0
SUM OF ALL RESPONSES TO PHQ QUESTIONS 1-9: 0
1. LITTLE INTEREST OR PLEASURE IN DOING THINGS: 0
2. FEELING DOWN, DEPRESSED OR HOPELESS: 0

## 2022-05-09 NOTE — PROGRESS NOTES
[address] [facility]  [address] [address]  [address] [address]  Dept: [phone]  Dept Fax: [phone]  Loc: [phone]      Subjective:     Chief Complaint   Patient presents with   [address]     needs a new PCP    Hand Pain     right hand and arm hurts up to elbow started 3 weeks        HPI:  Juana Stewart is a 39 y.o. male presents today to get establish as a new patient. He presents with pain in his R arm x 4 weeks. 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 ROS:   Review of Systems   Constitutional: Negative for appetite change, chills, fatigue and fever. HENT: Negative for congestion, ear pain, hearing loss, nosebleeds, sore throat and trouble swallowing. Eyes: Negative for pain and visual disturbance. Respiratory: Negative for cough, chest tightness, shortness of breath and wheezing. Cardiovascular: Negative for chest pain, palpitations and leg swelling. Gastrointestinal: Negative for abdominal pain, constipation, diarrhea, nausea and vomiting. Endocrine: Negative for cold intolerance, heat intolerance, polydipsia, polyphagia and polyuria. Genitourinary: Negative for difficulty urinating, dysuria, frequency, hematuria and urgency. Musculoskeletal: Negative for arthralgias, back pain, gait problem, joint swelling, myalgias, neck pain and neck stiffness. R arm/wrist pain , + swelling   Skin: Negative for pallor and rash. Allergic/Immunologic: Negative for environmental allergies and food allergies. Neurological: Negative for dizziness, weakness and numbness. Hematological: Negative for adenopathy. Does not bruise/bleed easily. Psychiatric/Behavioral: Negative for agitation, behavioral problems, decreased concentration and sleep disturbance. The patient is not nervous/anxious. PMHx:  History reviewed. No pertinent past medical history.   Patient Active Problem List   Diagnosis    YARED (obstructive sleep apnea) PSHx:  Past Surgical History:   Procedure Laterality Date    BACK SURGERY         PFHx:  History reviewed. No pertinent family history. SocialHx:  Social History     Tobacco Use    Smoking status: Current Every Day Smoker     Packs/day: 1.00     Types: Cigarettes     Start date: 2/9/1993    Smokeless tobacco: Current User   Substance Use Topics    Alcohol use: Never       Allergies:  No Known Allergies    Medications:  Current Outpatient Medications   Medication Sig Dispense Refill    traMADol (ULTRAM) 50 MG tablet Take 50 mg by mouth every 6 hours as needed for Pain.  traZODone (DESYREL) 150 MG tablet Take 150 mg by mouth nightly      methylPREDNISolone (MEDROL DOSEPACK) 4 MG tablet Take by mouth. 1 kit 0    naproxen (NAPROSYN) 500 MG tablet Take 1 tablet by mouth 2 times daily as needed for Pain 60 tablet 0     No current facility-administered medications for this visit. Objective:   PE:  /78   Pulse 100   Temp 97.2 °F (36.2 °C) (Temporal)   Ht 6' 4\" (1.93 m)   Wt (!) 318 lb 9.6 oz (144.5 kg)   SpO2 97%   BMI 38.78 kg/m²   Physical Exam  Vitals reviewed. Constitutional:       Appearance: Normal appearance. HENT:      Head: Normocephalic and atraumatic. Nose: Nose normal.      Mouth/Throat:      Mouth: Mucous membranes are moist.   Eyes:      Extraocular Movements: Extraocular movements intact. Pupils: Pupils are equal, round, and reactive to light. Cardiovascular:      Rate and Rhythm: Normal rate and regular rhythm. Pulses: Normal pulses. Heart sounds: Normal heart sounds. Pulmonary:      Breath sounds: Normal breath sounds. Abdominal:      General: Bowel sounds are normal.      Palpations: Abdomen is soft. Musculoskeletal:      Right forearm: Tenderness present. Right wrist: Swelling and tenderness present. Decreased range of motion. Left wrist: Normal.      Cervical back: Normal range of motion and neck supple.       Comments: unable to make a fist on R   Skin:     General: Skin is warm and dry. Neurological:      General: No focal deficit present. Mental Status: He is alert and oriented to person, place, and time. Psychiatric:         Mood and Affect: Mood normal.            Assessment & Plan   Roosevelt Hamman was seen today for establish care and hand pain. Diagnoses and all orders for this visit:    Encounter to establish care    De Quervain's disease (radial styloid tenosynovitis)  -     methylPREDNISolone (MEDROL DOSEPACK) 4 MG tablet; Take by mouth.  -     naproxen (NAPROSYN) 500 MG tablet; Take 1 tablet by mouth 2 times daily as needed for Pain  -     SPLINT VELCRO WRIST    Medication refill    Alternate cold and warm compress  Advised to give R wrist a rest - pt states that would be hard to do because of the kind of work he does    Return in about 2 weeks (around 5/23/2022) for follow-up recent visit. All questions were answered. Medications, including possible adverse effects, and instructions were reviewed and  understanding was confirmed. Follow-up recommendations, including when to contact or return to office (ie; if symptoms worsen or fail to improve), were discussed and acknowledged.     Electronically signed by Imelda Alejandra MD on 5/9/22 at 4:43 PM CDT

## 2022-05-23 ENCOUNTER — OFFICE VISIT (OUTPATIENT)
Dept: PRIMARY CARE CLINIC | Age: 42
End: 2022-05-23
Payer: COMMERCIAL

## 2022-05-23 VITALS
BODY MASS INDEX: 38.36 KG/M2 | HEART RATE: 95 BPM | HEIGHT: 76 IN | WEIGHT: 315 LBS | OXYGEN SATURATION: 98 % | DIASTOLIC BLOOD PRESSURE: 80 MMHG | TEMPERATURE: 98.4 F | SYSTOLIC BLOOD PRESSURE: 130 MMHG

## 2022-05-23 DIAGNOSIS — M79.601 RIGHT ARM PAIN: Primary | ICD-10-CM

## 2022-05-23 DIAGNOSIS — M67.921 TENDINOPATHY OF ELBOW, RIGHT: ICD-10-CM

## 2022-05-23 PROCEDURE — 99214 OFFICE O/P EST MOD 30 MIN: CPT | Performed by: FAMILY MEDICINE

## 2022-05-23 RX ORDER — PREDNISONE 1 MG/1
5 TABLET ORAL 2 TIMES DAILY
Qty: 20 TABLET | Refills: 0 | Status: SHIPPED | OUTPATIENT
Start: 2022-05-23 | End: 2022-06-02

## 2022-05-23 SDOH — ECONOMIC STABILITY: FOOD INSECURITY: WITHIN THE PAST 12 MONTHS, THE FOOD YOU BOUGHT JUST DIDN'T LAST AND YOU DIDN'T HAVE MONEY TO GET MORE.: NEVER TRUE

## 2022-05-23 SDOH — ECONOMIC STABILITY: FOOD INSECURITY: WITHIN THE PAST 12 MONTHS, YOU WORRIED THAT YOUR FOOD WOULD RUN OUT BEFORE YOU GOT MONEY TO BUY MORE.: NEVER TRUE

## 2022-05-23 ASSESSMENT — ENCOUNTER SYMPTOMS
RESPIRATORY NEGATIVE: 1
EYES NEGATIVE: 1
GASTROINTESTINAL NEGATIVE: 1

## 2022-05-23 ASSESSMENT — SOCIAL DETERMINANTS OF HEALTH (SDOH): HOW HARD IS IT FOR YOU TO PAY FOR THE VERY BASICS LIKE FOOD, HOUSING, MEDICAL CARE, AND HEATING?: NOT HARD AT ALL

## 2022-05-23 NOTE — PROGRESS NOTES
[address] [facility]  [address] [address] [address] [address] [address]  Dept: [phone]  Dept Fax: [phone]  Loc: [phone]      Subjective:     Chief Complaint   Patient presents with    Arm Pain     pt states right arm from elbow to hand he has no strength and lose      2 Week Follow-Up       HPI:  Brenda Medellin is a 39 y.o. male presents for follow-up of pain his R arm . 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 He got some relief from the dose pack but when he completed the dose, the pain came back. He states pain and discomfort is worse at night when he is ready to go to bed. ROS:   Review of Systems   Constitutional: Negative for chills and fever. HENT: Negative. Eyes: Negative. Respiratory: Negative. Cardiovascular: Negative. Gastrointestinal: Negative. Endocrine: Negative for polyuria. Genitourinary: Negative. Musculoskeletal: Positive for arthralgias, joint swelling and myalgias. Neck pain: R forearm. R arm/wrist pain , + swelling   Skin: Negative for pallor and rash. Allergic/Immunologic: Negative for environmental allergies and food allergies. Neurological: Positive for weakness. Negative for dizziness. Hematological: Negative. Adenopathy: some in R forearm. Psychiatric/Behavioral: Positive for sleep disturbance. PMHx:  No past medical history on file. Patient Active Problem List   Diagnosis    YARED (obstructive sleep apnea)       PSHx:  Past Surgical History:   Procedure Laterality Date    BACK SURGERY         PFHx:  No family history on file.     SocialHx:  Social History     Tobacco Use    Smoking status: Current Every Day Smoker     Packs/day: 1.00     Types: Cigarettes     Start date: 2/9/1993    Smokeless tobacco: Current User   Substance Use Topics    Alcohol use: Never       Allergies:  No Known Allergies    Medications:  Current Outpatient Medications   Medication Sig Dispense Refill    predniSONE (DELTASONE) 5 MG tablet Take 1 tablet by mouth 2 times daily for 10 days 20 tablet 0    traMADol (ULTRAM) 50 MG tablet Take 50 mg by mouth every 6 hours as needed for Pain.  traZODone (DESYREL) 150 MG tablet Take 150 mg by mouth nightly      naproxen (NAPROSYN) 500 MG tablet Take 1 tablet by mouth 2 times daily as needed for Pain 60 tablet 0     No current facility-administered medications for this visit. Objective:   PE:  /80 (Site: Left Lower Arm, Position: Sitting)   Pulse 95   Temp 98.4 °F (36.9 °C) (Temporal)   Ht 6' 4\" (1.93 m)   Wt (!) 315 lb (142.9 kg)   SpO2 98%   BMI 38.34 kg/m²   Physical Exam  Vitals reviewed. Constitutional:       Appearance: Normal appearance. HENT:      Head: Normocephalic and atraumatic. Nose: Nose normal.      Mouth/Throat:      Mouth: Mucous membranes are moist.   Eyes:      Extraocular Movements: Extraocular movements intact. Pupils: Pupils are equal, round, and reactive to light. Cardiovascular:      Rate and Rhythm: Normal rate and regular rhythm. Pulses: Normal pulses. Heart sounds: Normal heart sounds. Pulmonary:      Breath sounds: Normal breath sounds. Abdominal:      General: Bowel sounds are normal.      Palpations: Abdomen is soft. Musculoskeletal:      Right forearm: Swelling and tenderness present. Right wrist: Tenderness present. Decreased range of motion. Left wrist: Normal.      Cervical back: Normal range of motion and neck supple. Skin:     General: Skin is warm and dry. Comments: tattoos all over both arms   Neurological:      General: No focal deficit present. Mental Status: He is alert and oriented to person, place, and time. Psychiatric:         Mood and Affect: Mood normal.            Assessment & Plan   Maribel Modi was seen today for arm pain and 2 week follow-up.     Diagnoses and all orders for this visit:    Right arm pain  -     External Referral To Orthopedic Surgery  -     [facility]    Tendinopathy of elbow, right  -     predniSONE (DELTASONE) 5 MG tablet; Take 1 tablet by mouth 2 times daily for 10 days    Avoid excessive use of arm  May need to reduce work hours  Start PT    Return for follow up as needed. All questions were answered. Medications, including possible adverse effects, and instructions were reviewed and  understanding was confirmed. Follow-up recommendations, including when to contact or return to office (ie; if symptoms worsen or fail to improve), were discussed and acknowledged.     Electronically signed by Ace Anderson MD on 5/23/22 at 4:25 PM CDT

## 2022-06-06 ENCOUNTER — TRANSCRIBE ORDERS (OUTPATIENT)
Dept: ADMINISTRATIVE | Facility: HOSPITAL | Age: 42
End: 2022-06-06

## 2022-06-06 DIAGNOSIS — S44.01XA INJURY OF RIGHT ULNAR NERVE AT UPPER ARM LEVEL, INITIAL ENCOUNTER: Primary | ICD-10-CM

## 2022-06-09 ENCOUNTER — HOSPITAL ENCOUNTER (OUTPATIENT)
Dept: CARDIOLOGY | Facility: HOSPITAL | Age: 42
Discharge: HOME | End: 2022-06-09
Admitting: NURSE PRACTITIONER

## 2022-06-09 VITALS
WEIGHT: 315 LBS | HEIGHT: 76 IN | BODY MASS INDEX: 38.36 KG/M2 | SYSTOLIC BLOOD PRESSURE: 140 MMHG | DIASTOLIC BLOOD PRESSURE: 93 MMHG

## 2022-06-09 LAB
BH CV ECHO MEAS - AO MAX PG: 8.1 MMHG
BH CV ECHO MEAS - AO MEAN PG: 5 MMHG
BH CV ECHO MEAS - AO ROOT DIAM: 3.4 CM
BH CV ECHO MEAS - AO V2 MAX: 142 CM/SEC
BH CV ECHO MEAS - AO V2 VTI: 28.3 CM
BH CV ECHO MEAS - AVA(I,D): 4.4 CM2
BH CV ECHO MEAS - EDV(MOD-SP4): 88.9 ML
BH CV ECHO MEAS - EF(MOD-SP4): 59.7 %
BH CV ECHO MEAS - ESV(MOD-SP4): 35.8 ML
BH CV ECHO MEAS - LA DIMENSION: 4.3 CM
BH CV ECHO MEAS - LAT PEAK E' VEL: 8.7 CM/SEC
BH CV ECHO MEAS - LV DIASTOLIC VOL/BSA (35-75): 32.9 CM2
BH CV ECHO MEAS - LV MAX PG: 6.8 MMHG
BH CV ECHO MEAS - LV MEAN PG: 4 MMHG
BH CV ECHO MEAS - LV SYSTOLIC VOL/BSA (12-30): 13.2 CM2
BH CV ECHO MEAS - LV V1 MAX: 130 CM/SEC
BH CV ECHO MEAS - LV V1 VTI: 27.6 CM
BH CV ECHO MEAS - LVOT AREA: 4.5 CM2
BH CV ECHO MEAS - LVOT DIAM: 2.4 CM
BH CV ECHO MEAS - MED PEAK E' VEL: 7.9 CM/SEC
BH CV ECHO MEAS - MV A MAX VEL: 70.7 CM/SEC
BH CV ECHO MEAS - MV DEC SLOPE: 374 CM/SEC2
BH CV ECHO MEAS - MV E MAX VEL: 78 CM/SEC
BH CV ECHO MEAS - MV E/A: 1.1
BH CV ECHO MEAS - MV P1/2T: 69.6 MSEC
BH CV ECHO MEAS - MVA(P1/2T): 3.2 CM2
BH CV ECHO MEAS - PA V2 MAX: 81.4 CM/SEC
BH CV ECHO MEAS - RAP SYSTOLE: 5 MMHG
BH CV ECHO MEAS - SI(MOD-SP4): 19.6 ML/M2
BH CV ECHO MEAS - SV(LVOT): 124.9 ML
BH CV ECHO MEAS - SV(MOD-SP4): 53.1 ML
BH CV ECHO MEAS - TAPSE (>1.6): 1.93 CM
BH CV ECHO MEASUREMENTS AVERAGE E/E' RATIO: 9.4
BH CV XLRA - RV BASE: 2.8 CM
LEFT ATRIUM VOLUME INDEX: 21.5 ML/M2
LEFT ATRIUM VOLUME: 55.1 ML
MAXIMAL PREDICTED HEART RATE: 179 BPM
STRESS TARGET HR: 152 BPM

## 2022-06-09 PROCEDURE — 93306 TTE W/DOPPLER COMPLETE: CPT | Performed by: EMERGENCY MEDICINE

## 2022-06-09 PROCEDURE — 93306 TTE W/DOPPLER COMPLETE: CPT

## 2022-07-18 ENCOUNTER — TRANSCRIBE ORDERS (OUTPATIENT)
Dept: ADMINISTRATIVE | Facility: HOSPITAL | Age: 42
End: 2022-07-18

## 2022-07-18 ENCOUNTER — HOSPITAL ENCOUNTER (OUTPATIENT)
Dept: NEUROLOGY | Facility: HOSPITAL | Age: 42
Discharge: HOME | End: 2022-07-18
Admitting: NURSE PRACTITIONER

## 2022-07-18 DIAGNOSIS — S44.01XA INJURY OF RIGHT ULNAR NERVE AT UPPER ARM LEVEL, INITIAL ENCOUNTER: ICD-10-CM

## 2022-07-18 DIAGNOSIS — S44.01XA INJURY OF RIGHT ULNAR NERVE AT UPPER ARM LEVEL, INITIAL ENCOUNTER: Primary | ICD-10-CM

## 2022-07-18 PROCEDURE — 95909 NRV CNDJ TST 5-6 STUDIES: CPT

## 2022-07-18 PROCEDURE — 95886 MUSC TEST DONE W/N TEST COMP: CPT

## 2022-08-01 ENCOUNTER — HOSPITAL ENCOUNTER (EMERGENCY)
Facility: HOSPITAL | Age: 42
Discharge: HOME | End: 2022-08-01
Attending: STUDENT IN AN ORGANIZED HEALTH CARE EDUCATION/TRAINING PROGRAM | Admitting: STUDENT IN AN ORGANIZED HEALTH CARE EDUCATION/TRAINING PROGRAM

## 2022-08-01 ENCOUNTER — APPOINTMENT (OUTPATIENT)
Dept: GENERAL RADIOLOGY | Facility: HOSPITAL | Age: 42
End: 2022-08-01

## 2022-08-01 ENCOUNTER — APPOINTMENT (OUTPATIENT)
Dept: CARDIOLOGY | Facility: HOSPITAL | Age: 42
End: 2022-08-01

## 2022-08-01 VITALS
HEIGHT: 76 IN | TEMPERATURE: 98 F | OXYGEN SATURATION: 95 % | DIASTOLIC BLOOD PRESSURE: 84 MMHG | RESPIRATION RATE: 18 BRPM | BODY MASS INDEX: 38.36 KG/M2 | HEART RATE: 91 BPM | SYSTOLIC BLOOD PRESSURE: 122 MMHG | WEIGHT: 315 LBS

## 2022-08-01 DIAGNOSIS — R07.9 CHEST PAIN, UNSPECIFIED TYPE: Primary | ICD-10-CM

## 2022-08-01 LAB
ALBUMIN SERPL-MCNC: 4.8 G/DL (ref 3.5–5.2)
ALBUMIN/GLOB SERPL: 1.7 G/DL
ALP SERPL-CCNC: 58 U/L (ref 39–117)
ALT SERPL W P-5'-P-CCNC: 45 U/L (ref 1–41)
ANION GAP SERPL CALCULATED.3IONS-SCNC: 12 MMOL/L (ref 5–15)
ANISOCYTOSIS BLD QL: ABNORMAL
AST SERPL-CCNC: 34 U/L (ref 1–40)
BASOPHILS # BLD MANUAL: 0.15 10*3/MM3 (ref 0–0.2)
BASOPHILS NFR BLD MANUAL: 1 % (ref 0–1.5)
BH CV STRESS BP STAGE 1: NORMAL
BH CV STRESS BP STAGE 2: NORMAL
BH CV STRESS DURATION MIN STAGE 1: 3
BH CV STRESS DURATION MIN STAGE 2: 3
BH CV STRESS DURATION MIN STAGE 3: 0
BH CV STRESS DURATION SEC STAGE 1: 0
BH CV STRESS DURATION SEC STAGE 2: 0
BH CV STRESS DURATION SEC STAGE 3: 46
BH CV STRESS GRADE STAGE 1: 10
BH CV STRESS GRADE STAGE 2: 12
BH CV STRESS GRADE STAGE 3: 14
BH CV STRESS HR STAGE 1: 117
BH CV STRESS HR STAGE 2: 144
BH CV STRESS HR STAGE 3: 160
BH CV STRESS METS STAGE 1: 5
BH CV STRESS METS STAGE 2: 7.5
BH CV STRESS METS STAGE 3: 10
BH CV STRESS PROTOCOL 1: NORMAL
BH CV STRESS RECOVERY BP: NORMAL MMHG
BH CV STRESS RECOVERY HR: 108 BPM
BH CV STRESS SPEED STAGE 1: 1.7
BH CV STRESS SPEED STAGE 2: 2.5
BH CV STRESS SPEED STAGE 3: 3.4
BH CV STRESS STAGE 1: 1
BH CV STRESS STAGE 2: 2
BH CV STRESS STAGE 3: 3
BILIRUB SERPL-MCNC: 0.3 MG/DL (ref 0–1.2)
BUN SERPL-MCNC: 16 MG/DL (ref 6–20)
BUN/CREAT SERPL: 16.8 (ref 7–25)
CALCIUM SPEC-SCNC: 9.4 MG/DL (ref 8.6–10.5)
CHLORIDE SERPL-SCNC: 102 MMOL/L (ref 98–107)
CO2 SERPL-SCNC: 26 MMOL/L (ref 22–29)
CREAT SERPL-MCNC: 0.95 MG/DL (ref 0.76–1.27)
D DIMER PPP FEU-MCNC: 0.33 MCGFEU/ML (ref 0–0.5)
DEPRECATED RDW RBC AUTO: 44.6 FL (ref 37–54)
EGFRCR SERPLBLD CKD-EPI 2021: 102.5 ML/MIN/1.73
EOSINOPHIL # BLD MANUAL: 0.15 10*3/MM3 (ref 0–0.4)
EOSINOPHIL NFR BLD MANUAL: 1 % (ref 0.3–6.2)
ERYTHROCYTE [DISTWIDTH] IN BLOOD BY AUTOMATED COUNT: 14 % (ref 12.3–15.4)
GIANT PLATELETS: ABNORMAL
GLOBULIN UR ELPH-MCNC: 2.9 GM/DL
GLUCOSE SERPL-MCNC: 176 MG/DL (ref 65–99)
HCT VFR BLD AUTO: 49.2 % (ref 37.5–51)
HGB BLD-MCNC: 16.9 G/DL (ref 13–17.7)
HOLD SPECIMEN: NORMAL
LYMPHOCYTES # BLD MANUAL: 1.82 10*3/MM3 (ref 0.7–3.1)
LYMPHOCYTES NFR BLD MANUAL: 3 % (ref 5–12)
MAGNESIUM SERPL-MCNC: 2.2 MG/DL (ref 1.6–2.6)
MAXIMAL PREDICTED HEART RATE: 178 BPM
MCH RBC QN AUTO: 29.9 PG (ref 26.6–33)
MCHC RBC AUTO-ENTMCNC: 34.3 G/DL (ref 31.5–35.7)
MCV RBC AUTO: 87.1 FL (ref 79–97)
MONOCYTES # BLD: 0.45 10*3/MM3 (ref 0.1–0.9)
NEUTROPHILS # BLD AUTO: 12.44 10*3/MM3 (ref 1.7–7)
NEUTROPHILS NFR BLD MANUAL: 82.8 % (ref 42.7–76)
PERCENT MAX PREDICTED HR: 89.89 %
PLATELET # BLD AUTO: 177 10*3/MM3 (ref 140–450)
PMV BLD AUTO: 13 FL (ref 6–12)
POTASSIUM SERPL-SCNC: 4.5 MMOL/L (ref 3.5–5.2)
PROT SERPL-MCNC: 7.7 G/DL (ref 6–8.5)
RBC # BLD AUTO: 5.65 10*6/MM3 (ref 4.14–5.8)
SARS-COV-2 RNA PNL SPEC NAA+PROBE: NOT DETECTED
SODIUM SERPL-SCNC: 140 MMOL/L (ref 136–145)
STRESS BASELINE BP: NORMAL MMHG
STRESS BASELINE HR: 67 BPM
STRESS PERCENT HR: 106 %
STRESS POST ESTIMATED WORKLOAD: 10 METS
STRESS POST EXERCISE DUR MIN: 6 MIN
STRESS POST EXERCISE DUR SEC: 46 SEC
STRESS POST PEAK BP: NORMAL MMHG
STRESS POST PEAK HR: 160 BPM
STRESS TARGET HR: 151 BPM
TROPONIN T SERPL-MCNC: <0.01 NG/ML (ref 0–0.03)
TROPONIN T SERPL-MCNC: <0.01 NG/ML (ref 0–0.03)
VARIANT LYMPHS NFR BLD MANUAL: 12.1 % (ref 19.6–45.3)
WBC MORPH BLD: NORMAL
WBC NRBC COR # BLD: 15.03 10*3/MM3 (ref 3.4–10.8)
WHOLE BLOOD HOLD COAG: NORMAL
WHOLE BLOOD HOLD SPECIMEN: NORMAL

## 2022-08-01 PROCEDURE — 84484 ASSAY OF TROPONIN QUANT: CPT

## 2022-08-01 PROCEDURE — 93320 DOPPLER ECHO COMPLETE: CPT

## 2022-08-01 PROCEDURE — 71045 X-RAY EXAM CHEST 1 VIEW: CPT

## 2022-08-01 PROCEDURE — 99284 EMERGENCY DEPT VISIT MOD MDM: CPT

## 2022-08-01 PROCEDURE — 85379 FIBRIN DEGRADATION QUANT: CPT | Performed by: STUDENT IN AN ORGANIZED HEALTH CARE EDUCATION/TRAINING PROGRAM

## 2022-08-01 PROCEDURE — 93018 CV STRESS TEST I&R ONLY: CPT | Performed by: EMERGENCY MEDICINE

## 2022-08-01 PROCEDURE — 93325 DOPPLER ECHO COLOR FLOW MAPG: CPT

## 2022-08-01 PROCEDURE — 80053 COMPREHEN METABOLIC PANEL: CPT

## 2022-08-01 PROCEDURE — 85025 COMPLETE CBC W/AUTO DIFF WBC: CPT

## 2022-08-01 PROCEDURE — 93352 ADMIN ECG CONTRAST AGENT: CPT | Performed by: EMERGENCY MEDICINE

## 2022-08-01 PROCEDURE — 84484 ASSAY OF TROPONIN QUANT: CPT | Performed by: STUDENT IN AN ORGANIZED HEALTH CARE EDUCATION/TRAINING PROGRAM

## 2022-08-01 PROCEDURE — 85007 BL SMEAR W/DIFF WBC COUNT: CPT

## 2022-08-01 PROCEDURE — 93350 STRESS TTE ONLY: CPT

## 2022-08-01 PROCEDURE — 99283 EMERGENCY DEPT VISIT LOW MDM: CPT

## 2022-08-01 PROCEDURE — 93017 CV STRESS TEST TRACING ONLY: CPT

## 2022-08-01 PROCEDURE — 93005 ELECTROCARDIOGRAM TRACING: CPT

## 2022-08-01 PROCEDURE — 83735 ASSAY OF MAGNESIUM: CPT | Performed by: STUDENT IN AN ORGANIZED HEALTH CARE EDUCATION/TRAINING PROGRAM

## 2022-08-01 PROCEDURE — 93350 STRESS TTE ONLY: CPT | Performed by: EMERGENCY MEDICINE

## 2022-08-01 PROCEDURE — 25010000002 PERFLUTREN 6.52 MG/ML SUSPENSION: Performed by: STUDENT IN AN ORGANIZED HEALTH CARE EDUCATION/TRAINING PROGRAM

## 2022-08-01 PROCEDURE — 87635 SARS-COV-2 COVID-19 AMP PRB: CPT | Performed by: STUDENT IN AN ORGANIZED HEALTH CARE EDUCATION/TRAINING PROGRAM

## 2022-08-01 PROCEDURE — 36415 COLL VENOUS BLD VENIPUNCTURE: CPT

## 2022-08-01 PROCEDURE — 93010 ELECTROCARDIOGRAM REPORT: CPT | Performed by: INTERNAL MEDICINE

## 2022-08-01 PROCEDURE — 93005 ELECTROCARDIOGRAM TRACING: CPT | Performed by: STUDENT IN AN ORGANIZED HEALTH CARE EDUCATION/TRAINING PROGRAM

## 2022-08-01 RX ORDER — FUROSEMIDE 40 MG/1
40 TABLET ORAL DAILY
COMMUNITY

## 2022-08-01 RX ORDER — ASPIRIN 81 MG/1
324 TABLET, CHEWABLE ORAL ONCE
Status: COMPLETED | OUTPATIENT
Start: 2022-08-01 | End: 2022-08-01

## 2022-08-01 RX ORDER — SODIUM CHLORIDE 0.9 % (FLUSH) 0.9 %
10 SYRINGE (ML) INJECTION AS NEEDED
Status: DISCONTINUED | OUTPATIENT
Start: 2022-08-01 | End: 2022-08-01 | Stop reason: HOSPADM

## 2022-08-01 RX ORDER — CLONIDINE HYDROCHLORIDE 0.1 MG/1
0.1 TABLET ORAL DAILY
COMMUNITY

## 2022-08-01 RX ADMIN — ASPIRIN 324 MG: 81 TABLET, CHEWABLE ORAL at 13:16

## 2022-08-01 RX ADMIN — SODIUM CHLORIDE, POTASSIUM CHLORIDE, SODIUM LACTATE AND CALCIUM CHLORIDE 500 ML: 600; 310; 30; 20 INJECTION, SOLUTION INTRAVENOUS at 13:15

## 2022-08-01 RX ADMIN — PERFLUTREN 8.48 MG: 6.52 INJECTION, SUSPENSION INTRAVENOUS at 16:06

## 2022-08-01 NOTE — DISCHARGE INSTRUCTIONS
It was very nice to meet you, Miguel. Thank you for allowing us to take care of you today at [facility].    Your stress test was low risk for ischemia. Your work-up today did not show any emergent findings or emergent indications for admission to the hospital. While it is unclear what exactly is the cause of your symptoms, please understand that an ER evaluation is considered to be just the start of your evaluation. We will do what we can in one visit, but we are often unable to fully figure out what is causing your symptoms from one evaluation. Thus our primary goal is to determine whether you need to be evaluated in the hospital or if it is safe for you to go home and see other doctors such as a primary care physician or a specialist on an outpatient basis. A copy of your results should be included in your paperwork.     It is VERY IMPORTANT that you follow up (call them to set up an appointment) with your primary care doctor* within the next few days or as soon as possible so that you can be re-evaluated for improvement in your symptoms or for any other questions. If you were prescribed any medications, please take them as directed or call us back with any questions.     Please return to the emergency room within 12-48 hours if you experience fever, chills, chest pain or shortness of breath, pain with inspiration/expiration, pain that travels to your arms, neck or back, nausea, vomiting, severe headache, tearing pain in your chest, dizziness, feel as though you are about to pass out, have any worsening symptoms, or any other concerns.

## 2022-08-02 LAB
QT INTERVAL: 316 MS
QTC INTERVAL: 421 MS

## 2022-08-08 NOTE — ED PROVIDER NOTES
Subjective   Patient states that he has been having some chest pain and weakness and chills that started a few days ago.  He states that he has had a slight headache and some blurry vision as well.  Denies any recent falls, fevers, neck stiffness, rashes or any exposure to anybody that sick.  States that he only has a history of high blood pressure.  States that he has never had a cardiac stress test in the past.  States that the tightness is constant and is not exacerbated by anything or alleviated by anything.  Denies any numbness or tingling or any weakness or sensory deficits.          Review of Systems   All other systems reviewed and are negative.      Past Medical History:   Diagnosis Date   • GERD (gastroesophageal reflux disease)    • Hypertension        No Known Allergies    Past Surgical History:   Procedure Laterality Date   • BACK SURGERY         History reviewed. No pertinent family history.    Social History     Socioeconomic History   • Marital status: Significant Other   Tobacco Use   • Smoking status: Current Every Day Smoker     Packs/day: 1.00     Types: Cigarettes   • Smokeless tobacco: Never Used           Objective   Physical Exam  Vitals and nursing note reviewed.   Constitutional:       General: He is not in acute distress.     Appearance: Normal appearance. He is not toxic-appearing or diaphoretic.   HENT:      Head: Normocephalic and atraumatic.      Nose: Nose normal.   Eyes:      General:         Right eye: No discharge.         Left eye: No discharge.      Extraocular Movements: Extraocular movements intact.      Conjunctiva/sclera: Conjunctivae normal.      Pupils: Pupils are equal, round, and reactive to light.   Cardiovascular:      Rate and Rhythm: Normal rate.   Pulmonary:      Effort: Pulmonary effort is normal. No respiratory distress.      Breath sounds: No rhonchi.   Abdominal:      General: Abdomen is flat.   Musculoskeletal:         General: Normal range of motion.       Cervical back: Normal range of motion and neck supple. No rigidity or tenderness.   Lymphadenopathy:      Cervical: No cervical adenopathy.   Skin:     General: Skin is warm.   Neurological:      General: No focal deficit present.      Mental Status: He is alert and oriented to person, place, and time. Mental status is at baseline.      Cranial Nerves: No cranial nerve deficit.      Sensory: No sensory deficit.      Motor: No weakness.      Coordination: Coordination normal.      Gait: Gait normal.   Psychiatric:         Mood and Affect: Mood normal.         Behavior: Behavior normal.         Thought Content: Thought content normal.         Judgment: Judgment normal.         Procedures           ED Course                                           MDM  Number of Diagnoses or Management Options  Chest pain, unspecified type  Diagnosis management comments: This is a 42yoM presenting with chest pain and fatigue and headache. Patient arrived hemodynamically stable and was afebrile. Patient was placed on the monitor and IV access was established.     Patient has received a total 324mg of aspirin since the onset of chest pain.     Differentials include, but are not limited to ACS, PE, musculoskeletal pain, infection, migraine. HEART score is 4. Plan to obtain cbc, cmp, ekg, troponin x 2, control pain, and reassess. EKG was obtained and did not reveal any malignant/unstable dysrhythmia or any acute ST elevations.     Presentation not consistent with other acute, emergent causes of chest pain at this time. Low suspicion for pneumothorax as the patient is saturating well and has no radiographic evidence of a pneumothorax. Low suspicion for dissection there is no widened mediastinum, hypotension, pulse deficits, and no tearing back/abdominal pain. Low suspicion for tamponade as there is no JVD, muffled heart sounds, electrical alternans on EKG, and no hypotension. Low suspicion for pericarditis as there is no diffuse ST  elevation or MA depression and the patient is afebrile. Low suspicion for myocarditis as there is no persistent tachycardia, recent viral illness, hypotension, or evidence of LVH. Low suspicion for acute PE as low risk per WELLS criteria and no evidence of DVT such as calf swelling, tenderness, palpable tortuous lower extremity vein, or alena's sign.     The workup was reviewed and found to have evidence of chest pain with an elevated heart score. Headache improved. Will send him to stress test. Stress test reviewed and low risk for myocardial ischemia. I reassessed the patient and discussed the findings of the work up so far. I told him that it is important that he follows up with a PCP soon. I answered all his questions regarding the emergency department evaluation, diagnosis, and treatment plan in plain and simple language that he was able to understand.     He voiced agreement with the plan of care so far and had no further questions. I told him that there is always some diagnostic uncertainty in the ER and that his work up, physical exam, and even his current presentation may not always reveal other underlying conditions. I also went over the fact that his condition may change or show itself after being discharged. He expressed understanding and agreed that there are reasonable limitations with the practice of emergency medicine.    I gave him return precautions and told him to return to the emergency department within 24 - 48hrs if he has any new, worsening, or concerning symptoms.     I told him that it is VERY IMPORTANT that he follows up (by calling to set up an appointment) with his primary care doctor within the next few days or as soon as reasonably possible so that he can be re-evaluated for improvement in his symptoms or for any other questions. He verbalized understanding of these instructions.     He was discharged in stable condition and was observed ambulating out of the ER.            Amount  and/or Complexity of Data Reviewed  Clinical lab tests: reviewed and ordered  Tests in the radiology section of CPT®: reviewed and ordered  Tests in the medicine section of CPT®: reviewed        Final diagnoses:   Chest pain, unspecified type       ED Disposition  ED Disposition     ED Disposition   Discharge    Condition   Stable    Comment   --             Edie Newman, APRN  [address] [address]  [phone]    Call in 1 day  As needed, If symptoms worsen         Medication List      No changes were made to your prescriptions during this visit.          Tyler Conley MD  08/07/22 9722

## 2022-08-31 ENCOUNTER — HOSPITAL ENCOUNTER (OUTPATIENT)
Dept: GENERAL RADIOLOGY | Facility: HOSPITAL | Age: 42
Discharge: HOME | End: 2022-08-31
Admitting: NURSE PRACTITIONER

## 2022-08-31 ENCOUNTER — OFFICE VISIT (OUTPATIENT)
Dept: NEUROSURGERY | Facility: CLINIC | Age: 42
End: 2022-08-31

## 2022-08-31 VITALS — HEIGHT: 76 IN | BODY MASS INDEX: 38.36 KG/M2 | WEIGHT: 315 LBS

## 2022-08-31 DIAGNOSIS — M79.601 PAIN OF RIGHT UPPER EXTREMITY: ICD-10-CM

## 2022-08-31 DIAGNOSIS — G56.01 CARPAL TUNNEL SYNDROME OF RIGHT WRIST: ICD-10-CM

## 2022-08-31 DIAGNOSIS — Z72.0 TOBACCO ABUSE: ICD-10-CM

## 2022-08-31 DIAGNOSIS — E66.09 CLASS 2 OBESITY DUE TO EXCESS CALORIES WITH BODY MASS INDEX (BMI) OF 39.0 TO 39.9 IN ADULT, UNSPECIFIED WHETHER SERIOUS COMORBIDITY PRESENT: ICD-10-CM

## 2022-08-31 DIAGNOSIS — R20.0 NUMBNESS AND TINGLING OF RIGHT UPPER EXTREMITY: ICD-10-CM

## 2022-08-31 DIAGNOSIS — R20.2 NUMBNESS AND TINGLING OF RIGHT UPPER EXTREMITY: ICD-10-CM

## 2022-08-31 DIAGNOSIS — R29.898 WEAKNESS OF RIGHT ARM: ICD-10-CM

## 2022-08-31 DIAGNOSIS — R29.898 WEAKNESS OF RIGHT ARM: Primary | ICD-10-CM

## 2022-08-31 PROCEDURE — 72052 X-RAY EXAM NECK SPINE 6/>VWS: CPT

## 2022-08-31 PROCEDURE — 99204 OFFICE O/P NEW MOD 45 MIN: CPT | Performed by: NURSE PRACTITIONER

## 2022-08-31 RX ORDER — HYDROXYZINE PAMOATE 25 MG/1
CAPSULE ORAL
COMMUNITY
Start: 2022-08-04

## 2022-08-31 RX ORDER — HYDROCODONE BITARTRATE AND ACETAMINOPHEN 7.5; 325 MG/1; MG/1
TABLET ORAL
COMMUNITY
Start: 2022-08-30 | End: 2022-12-05

## 2022-08-31 RX ORDER — OMEPRAZOLE 40 MG/1
40 CAPSULE, DELAYED RELEASE ORAL DAILY
COMMUNITY
Start: 2022-08-04

## 2022-08-31 RX ORDER — FLUOXETINE 10 MG/1
10 CAPSULE ORAL DAILY
COMMUNITY
Start: 2022-08-04

## 2022-08-31 RX ORDER — LOSARTAN POTASSIUM AND HYDROCHLOROTHIAZIDE 12.5; 5 MG/1; MG/1
1 TABLET ORAL DAILY
COMMUNITY
Start: 2022-08-04

## 2022-08-31 NOTE — PROGRESS NOTES
Primary Care Provider: Edie Newman APRN  Requesting Provider: Edie Newman APRN    Chief Complaint:   Chief Complaint   Patient presents with   • Arm Pain     Pt is here with complaints of rt arm and hand pain, numbness and tingling.     History of Present Illness  Consultation at the request of FELICITAS Grover.    HISTORY/ HPI:  Miguel Willis is a 42 y.o.  male who present today with a complaint of right arm pain, numbness, and tingling.      Onset of his symptoms began 2-3 months ago.  He denies injury.  He additionally denies neck pain, however reports nondermatomal right arm pain and dysesthesias that occur in an ascending manner and extend to the elbow, as well as a decline in right hand dexterity, right upper extremity weakness, and frequent dropped items.  Symptoms of worsen with repetitive movement.  Despite use of diclofenac and hydrocodone he denies alleviating factors.  He additionally denies fevers, chills, night sweats, gait or balance abnormalities, falls, saddle anesthesia, or bowel or bladder dysfunction.  He currently rates the severity of his symptoms 7/10.  No additional concerns at this time.    Mr. Willis has not recently completed nor participated in a dedicated course of physician directed physical therapy, massage care, chiropractic care, nor been evaluated by pain management.    Oswestry Disability Index = 20%   Score   Pain Intensity No pain - 0   Personal Care Look after myself normally without causing extra pain-0   Lifting Lift heavy weights without extra pain-0   Reading Read with slight pain-1   Headaches Moderate infrequent headaches-2   Concentration Concentrate Fully-0   Work Cannot do usual work-3   Driving I can drive-0   Sleeping 2 to 3 hours of sleepiness-3   Recreation All recreational activities with some pain-1     SCORE INTERPRETATION OF THE OSWESTRY NECK DISABILITY QUESTIONNAIRE  10-28: Mild disability   (Windom et al, 1980)    Modified Japanese  Orthopedic Association (mJOA) score  CATEGORY SCORE   Upper extremity Motor Subscore Mild difficulty buttoning shirt-4   Lower Extremity Subscore Mild imbalance when standing or walking-6   Upper Extremity Sensory Score Severe loss of hand sensation or pain-1   Urinary Function Subscore Normal urination-3   TOTAL = 14/18    The mJOA is an 18 point score of functional disability specific to cervical myelopathy.   12-14: Moderate Myelopathy  Natasha et al. (1991). Shavon et al.     The mJOA is an 18 point score of functional disability specific to cervical myelopathy. Natasha et al. (1991).[2]    ROS:  Review of Systems   Eyes: Negative.    Respiratory: Negative.    Cardiovascular: Negative.    Gastrointestinal: Negative.    Endocrine: Negative.    Genitourinary: Negative.    Musculoskeletal: Negative.    Skin: Negative.    Allergic/Immunologic: Negative.    Neurological: Positive for weakness and numbness.   Hematological: Negative.    Psychiatric/Behavioral: Negative.    All other systems reviewed and are negative.    Past Medical History:   Diagnosis Date   • GERD (gastroesophageal reflux disease)    • Hypertension    • Low back pain      Past Surgical History:   Procedure Laterality Date   • BACK SURGERY       Family History: family history includes Cancer in his mother; Diabetes in his father; Hypertension in his father.    Social History:  reports that he has been smoking cigarettes. He has been smoking about 1.00 pack per day. He has never used smokeless tobacco.    Medications:    Current Outpatient Medications:   •  cloNIDine (CATAPRES) 0.1 MG tablet, Take 0.1 mg by mouth Daily., Disp: , Rfl:   •  diclofenac (VOLTAREN) 75 MG EC tablet, Take 75 mg by mouth 2 (Two) Times a Day., Disp: , Rfl:   •  furosemide (LASIX) 40 MG tablet, Take 40 mg by mouth Daily., Disp: , Rfl:   •  traZODone (DESYREL) 100 MG tablet, Take 100 mg by mouth Every Night., Disp: , Rfl:   •  FLUoxetine (PROzac) 10 MG capsule, Take 10 mg by  "mouth Daily., Disp: , Rfl:   •  HYDROcodone-acetaminophen (NORCO) 7.5-325 MG per tablet, , Disp: , Rfl:   •  hydrOXYzine pamoate (VISTARIL) 25 MG capsule, TAKE 1 CAPSULE BY MOUTH THREE TIMES A DAY AS NEEDED ANXIETY, Disp: , Rfl:   •  losartan-hydrochlorothiazide (HYZAAR) 50-12.5 MG per tablet, Take 1 tablet by mouth Daily., Disp: , Rfl:   •  omeprazole (priLOSEC) 40 MG capsule, Take 40 mg by mouth Daily., Disp: , Rfl:     Allergies:  Patient has no known allergies.    OBJECTIVE:  Objective   Ht 193 cm (76\")   Wt (!) 148 kg (327 lb)   BMI 39.80 kg/m²   Physical Exam  Vitals and nursing note reviewed.   Constitutional:       General: He is not in acute distress.     Appearance: Normal appearance. He is well-developed and well-groomed. He is obese. He is not ill-appearing, toxic-appearing or diaphoretic.      Comments: BMI 39.80   HENT:      Head: Normocephalic and atraumatic.      Right Ear: Hearing normal.      Left Ear: Hearing normal.   Eyes:      Extraocular Movements: EOM normal.      Conjunctiva/sclera: Conjunctivae normal.      Pupils: Pupils are equal, round, and reactive to light.   Neck:      Trachea: Trachea normal.   Cardiovascular:      Rate and Rhythm: Normal rate and regular rhythm.   Pulmonary:      Effort: Pulmonary effort is normal. No tachypnea, bradypnea, accessory muscle usage or respiratory distress.   Abdominal:      Palpations: Abdomen is soft.   Musculoskeletal:      Cervical back: Full passive range of motion without pain and neck supple.   Skin:     General: Skin is warm and dry.   Neurological:      Mental Status: He is alert and oriented to person, place, and time.      GCS: GCS eye subscore is 4. GCS verbal subscore is 5. GCS motor subscore is 6.      Gait: Gait is intact.      Deep Tendon Reflexes:      Reflex Scores:       Tricep reflexes are 3+ on the right side and 3+ on the left side.       Bicep reflexes are 3+ on the right side and 3+ on the left side.       Brachioradialis " reflexes are 3+ on the right side and 3+ on the left side.       Patellar reflexes are 3+ on the right side and 3+ on the left side.       Achilles reflexes are 1+ on the right side and 1+ on the left side.  Psychiatric:         Speech: Speech normal.         Behavior: Behavior normal. Behavior is cooperative.       Neurologic Exam     Mental Status   Oriented to person, place, and time.   Attention: normal. Concentration: normal.   Speech: speech is normal   Level of consciousness: alert    Cranial Nerves     CN II   Visual fields full to confrontation.     CN III, IV, VI   Pupils are equal, round, and reactive to light.  Extraocular motions are normal.     CN V   Facial sensation intact.     CN VII   Facial expression full, symmetric.     CN VIII   CN VIII normal.     CN IX, X   CN IX normal.     CN XI   CN XI normal.     Motor Exam   Right arm tone: normal  Left arm tone: normal  Right leg tone: normal  Left leg tone: normal    Strength   Right deltoid: 4/5  Left deltoid: 5/5  Right biceps: 4/5  Left biceps: 5/5  Right triceps: 5/5  Left triceps: 5/5  Right wrist extension: 5/5  Left wrist extension: 5/5  Right iliopsoas: 5/5  Left iliopsoas: 5/5  Right quadriceps: 5/5  Left quadriceps: 5/5  Right anterior tibial: 5/5  Left anterior tibial: 5/5  Right gastroc: 5/5  Left gastroc: 5/5  Right EHL 5/5  Left EHL 5/5       Sensory Exam   Right arm light touch: decreased from elbow  Left arm light touch: normal  Right leg light touch: normal  Left leg light touch: normal  Sensory deficit distribution on right: non-dermatomal distribution    Gait, Coordination, and Reflexes     Gait  Gait: normal    Tremor   Resting tremor: absent  Intention tremor: absent  Action tremor: absent    Reflexes   Right brachioradialis: 3+  Left brachioradialis: 3+  Right biceps: 3+  Left biceps: 3+  Right triceps: 3+  Left triceps: 3+  Right patellar: 3+  Left patellar: 3+  Right achilles: 1+  Left achilles: 1+  Right plantar: upgoing  Left  plantar: upgoing  Right Reese: absent  Left Reese: absent  Right ankle clonus: absent  Left ankle clonus: absent  Right pendular knee jerk: absent  Left pendular knee jerk: absent    Male  strength (pounds)  AGE Right Hand RH Norms Left Hand LH Norms   20-24  121+20.6  104+21.8   25-29  120+23.0  110+16.2   30-34  121+22.4  110+21.7   35-39  119+24  113+21.7   40-44 *50 117+20.7 100 112+18.7   45-49  110+23.0  101+22.8   50-54  113+18.1  102+17   55-59  101+26.7  83+23.4   60-64  90+20.4  77+20.3   65-69  91+20.6  76.8+19.8   70-74  75+21.5  65+18.1   75+  66+21.0  55+17.0   (LOUISE Terry et al; Hand Dynometer: Effects of trials and sessions.  Perpetual and Motor Skills 61:195-8, 1985)  * = Dominant hand  > = Intervention    Imaging: (independent review and interpretation)  7/18/2022      ASSESSMENT/ PLAN:  Miguel Willis is a 42 y.o. male with a significant medical history of lumbar fusion 20 years prior by Dr. Garcia, tobacco abuse, and obesity.  He presents with a new problem of right arm pain and dysesthesias. YOGESH: 20.  mJOA: 14.  Physical exam findings of right  strength is approximately 3 standard deviations below age-matched controls, left  strength is slightly less than 1 standard deviation below age-matched controls, dexterity is fairly well-maintained, +4/5 right deltoid and bicep weakness, and mild gross hyperreflexia without Reese's, or clonus, and bilateral Babinski.   No recent imaging dedicated to the cervical spine.  EMG and nerve conduction study obtained on 7/19/2022 shows mild right median nerve compromise at or near the wrist.    RECOMMENDATIONS ...  Hyperreflexia/myelopathy  DDX:  Congenital: Chiari malformation, tethered cord, syringomyelia, hereditary spastic paraplegia  Acquired: Cervical or thoracic spinal stenosis, traumatic, herniated disc, kyphosis, extra medullary hematopoiesis, epidural lipomatosis, OPLL, Paget's disease  Neoplastic: Spinal cord tumors intra or extra  medullary, carcinomatosis, paraneoplastic syndromes  Vascular: Epidural hematoma, spinal cord infarction, vascular malformation such as AVM  Iatrogenic: Radiation myelopathy  Infectious: Post viral or autoimmune.  Often seen in HIV, syphilis, cytomegalovirus, herpes simplex 2 and CJD  Demyelinating: Acute transverse myelitis, multiple sclerosis, Devic's syndrome  Metabolic: Subacute combined systemic disease due to vitamin B-12 deficiency  Motor neuron disease: Amyotrophic lateral sclerosis, primary lateral sclerosis    Mr. Willis presents today for evaluation of mild right carpal tunnel, however complains of nondermatomal right arm pain and dysesthesias in a stocking glove distribution from the wrist and upon physical exam was found to have bilateral  strength weakness, right deltoid and bicep weakness, and mild gross hyperreflexia with bilateral Babinski's.  Given his complaints and physical exam findings, I find it prudent to proceed today by obtaining x-rays of the cervical spine complete with flexion and extension.  We will also send him for a dedicated course of physician directed physical therapy as a means of conservative management for his overall complaints, Rx provided.  We will have him return for reassessment after completion of physical therapy.  I advised the patient to call to return sooner for new or worsening complaints of weakness, paresthesias, gait disturbances, or any additional concerns.  Treatment options discussed in detail with Miguel and he voiced understanding.  Mr. Willis agrees with this plan of care.    Mild right Carpal Tunnel Syndrome   Differential diagnosis: Carpal tunnel syndrome, diabetic neuropathy, thoracic outlet syndrome, complex regional pain syndrome, cervical stenosis with radiculopathy.    I would first like Miguel to completed a rigorous course of conservative therapy including rest and bracing.  Rx provided for a right cock-up wrist splint which he should wear nightly.   We will see him back in the office for re-evaluation in 6-8 weeks after completion of physical therapy..    Tobacco abuse  The patient understands the many dangers of continuing to use tobacco.  If quitting becomes an increased priority Miguel knows to contact us for help with quitting.       Class 2 obesity (BMI 35.0-39.9) due to excessive calories with serious comorbidities BMI of 39.0-39.9 in adult  Body mass index is 39.8 kg/m². Information on the DASH diet provided in the AVS.  We will continue to provided diet and exercise information with the goal of weight loss at each scheduled appointment.     Diagnoses and all orders for this visit:    1. Weakness of right arm (Primary)  -     XR Spine Cervical Complete With Flex Ext; Future    2. Pain of right upper extremity  -     XR Spine Cervical Complete With Flex Ext; Future  -     Ambulatory Referral to Physical Therapy Evaluate and treat (2 to 3 times weekly for 6 weeks)    3. Numbness and tingling of right upper extremity  -     XR Spine Cervical Complete With Flex Ext; Future  -     Ambulatory Referral to Physical Therapy Evaluate and treat (2 to 3 times weekly for 6 weeks)    4. Carpal tunnel syndrome of right wrist  -     Miscellaneous DME    5. Tobacco abuse    6. Class 2 obesity due to excess calories with body mass index (BMI) of 39.0 to 39.9 in adult, unspecified whether serious comorbidity present      Return for FOLLOW WITH CARIE AFTER COMPLETION OF PT.    Thank you for this Consultation and the opportunity to participate in Miguel's care.    Sincerely,  Carie Mcpherson, FELICITAS    Level of Risk: Moderate due to: undiagnosed new problem  MDM: Moderate  (Mod = 69324, High = 57341)

## 2022-08-31 NOTE — PATIENT INSTRUCTIONS
"https://www.nhlbi.nih.gov/files/docs/public/heart/dash_brief.pdf\">   DASH Eating Plan  DASH stands for Dietary Approaches to Stop Hypertension. The DASH eating plan is a healthy eating plan that has been shown to:  Reduce high blood pressure (hypertension).  Reduce your risk for type 2 diabetes, heart disease, and stroke.  Help with weight loss.  What are tips for following this plan?  Reading food labels  Check food labels for the amount of salt (sodium) per serving. Choose foods with less than 5 percent of the Daily Value of sodium. Generally, foods with less than 300 milligrams (mg) of sodium per serving fit into this eating plan.  To find whole grains, look for the word \"whole\" as the first word in the ingredient list.  Shopping  Buy products labeled as \"low-sodium\" or \"no salt added.\"  Buy fresh foods. Avoid canned foods and pre-made or frozen meals.  Cooking  Avoid adding salt when cooking. Use salt-free seasonings or herbs instead of table salt or sea salt. Check with your health care provider or pharmacist before using salt substitutes.  Do not bañuelos foods. Cook foods using healthy methods such as baking, boiling, grilling, roasting, and broiling instead.  Cook with heart-healthy oils, such as olive, canola, avocado, soybean, or sunflower oil.  Meal planning    Eat a balanced diet that includes:  4 or more servings of fruits and 4 or more servings of vegetables each day. Try to fill one-half of your plate with fruits and vegetables.  6-8 servings of whole grains each day.  Less than 6 oz (170 g) of lean meat, poultry, or fish each day. A 3-oz (85-g) serving of meat is about the same size as a deck of cards. One egg equals 1 oz (28 g).  2-3 servings of low-fat dairy each day. One serving is 1 cup (237 mL).  1 serving of nuts, seeds, or beans 5 times each week.  2-3 servings of heart-healthy fats. Healthy fats called omega-3 fatty acids are found in foods such as walnuts, flaxseeds, fortified milks, and eggs. " These fats are also found in cold-water fish, such as sardines, salmon, and mackerel.  Limit how much you eat of:  Canned or prepackaged foods.  Food that is high in trans fat, such as some fried foods.  Food that is high in saturated fat, such as fatty meat.  Desserts and other sweets, sugary drinks, and other foods with added sugar.  Full-fat dairy products.  Do not salt foods before eating.  Do not eat more than 4 egg yolks a week.  Try to eat at least 2 vegetarian meals a week.  Eat more home-cooked food and less restaurant, buffet, and fast food.    Lifestyle  When eating at a restaurant, ask that your food be prepared with less salt or no salt, if possible.  If you drink alcohol:  Limit how much you use to:  0-1 drink a day for women who are not pregnant.  0-2 drinks a day for men.  Be aware of how much alcohol is in your drink. In the [place] one drink equals one 12 oz bottle of beer (355 mL), one 5 oz glass of wine (148 mL), or one 1½ oz glass of hard liquor (44 mL).  General information  Avoid eating more than 2,300 mg of salt a day. If you have hypertension, you may need to reduce your sodium intake to 1,500 mg a day.  Work with your health care provider to maintain a healthy body weight or to lose weight. Ask what an ideal weight is for you.  Get at least 30 minutes of exercise that causes your heart to beat faster (aerobic exercise) most days of the week. Activities may include walking, swimming, or biking.  Work with your health care provider or dietitian to adjust your eating plan to your individual calorie needs.  What foods should I eat?  Fruits  All fresh, dried, or frozen fruit. Canned fruit in natural juice (without added sugar).  Vegetables  Fresh or frozen vegetables (raw, steamed, roasted, or grilled). Low-sodium or reduced-sodium tomato and vegetable juice. Low-sodium or reduced-sodium tomato sauce and tomato paste. Low-sodium or reduced-sodium canned vegetables.  Grains  Whole-grain or  whole-wheat bread. Whole-grain or whole-wheat pasta. Brown rice. Oatmeal. Quinoa. Bulgur. Whole-grain and low-sodium cereals. Suzy bread. Low-fat, low-sodium crackers. Whole-wheat flour tortillas.  Meats and other proteins  Skinless chicken or turkey. Ground chicken or turkey. Pork with fat trimmed off. Fish and seafood. Egg whites. Dried beans, peas, or lentils. Unsalted nuts, nut butters, and seeds. Unsalted canned beans. Lean cuts of beef with fat trimmed off. Low-sodium, lean precooked or cured meat, such as sausages or meat loaves.  Dairy  Low-fat (1%) or fat-free (skim) milk. Reduced-fat, low-fat, or fat-free cheeses. Nonfat, low-sodium ricotta or cottage cheese. Low-fat or nonfat yogurt. Low-fat, low-sodium cheese.  Fats and oils  Soft margarine without trans fats. Vegetable oil. Reduced-fat, low-fat, or light mayonnaise and salad dressings (reduced-sodium). Canola, safflower, olive, avocado, soybean, and sunflower oils. Avocado.  Seasonings and condiments  Herbs. Spices. Seasoning mixes without salt.  Other foods  Unsalted popcorn and pretzels. Fat-free sweets.  The items listed above may not be a complete list of foods and beverages you can eat. Contact a dietitian for more information.  What foods should I avoid?  Fruits  Canned fruit in a light or heavy syrup. Fried fruit. Fruit in cream or butter sauce.  Vegetables  Creamed or fried vegetables. Vegetables in a cheese sauce. Regular canned vegetables (not low-sodium or reduced-sodium). Regular canned tomato sauce and paste (not low-sodium or reduced-sodium). Regular tomato and vegetable juice (not low-sodium or reduced-sodium). Pickles. Olives.  Grains  Baked goods made with fat, such as croissants, muffins, or some breads. Dry pasta or rice meal packs.  Meats and other proteins  Fatty cuts of meat. Ribs. Fried meat. Neville. Bologna, salami, and other precooked or cured meats, such as sausages or meat loaves. Fat from the back of a pig (fatback).  Bratwurst. Salted nuts and seeds. Canned beans with added salt. Canned or smoked fish. Whole eggs or egg yolks. Chicken or turkey with skin.  Dairy  Whole or 2% milk, cream, and half-and-half. Whole or full-fat cream cheese. Whole-fat or sweetened yogurt. Full-fat cheese. Nondairy creamers. Whipped toppings. Processed cheese and cheese spreads.  Fats and oils  Butter. Stick margarine. Lard. Shortening. Ghee. Neville fat. Tropical oils, such as coconut, palm kernel, or palm oil.  Seasonings and condiments  Onion salt, garlic salt, seasoned salt, table salt, and sea salt. Worcestershire sauce. Tartar sauce. Barbecue sauce. Teriyaki sauce. Soy sauce, including reduced-sodium. Steak sauce. Canned and packaged gravies. Fish sauce. Oyster sauce. Cocktail sauce. Store-bought horseradish. Ketchup. Mustard. Meat flavorings and tenderizers. Bouillon cubes. Hot sauces. Pre-made or packaged marinades. Pre-made or packaged taco seasonings. Relishes. Regular salad dressings.  Other foods  Salted popcorn and pretzels.  The items listed above may not be a complete list of foods and beverages you should avoid. Contact a dietitian for more information.  Where to find more information  National Heart, Lung, and Blood El Portal: www.nhlbi.nih.gov  American Heart Association: www.heart.org  Academy of Nutrition and Dietetics: www.eatright.org  National Kidney Foundation: www.kidney.org  Summary  The DASH eating plan is a healthy eating plan that has been shown to reduce high blood pressure (hypertension). It may also reduce your risk for type 2 diabetes, heart disease, and stroke.  When on the DASH eating plan, aim to eat more fresh fruits and vegetables, whole grains, lean proteins, low-fat dairy, and heart-healthy fats.  With the DASH eating plan, you should limit salt (sodium) intake to 2,300 mg a day. If you have hypertension, you may need to reduce your sodium intake to 1,500 mg a day.  Work with your health care provider or  dietitian to adjust your eating plan to your individual calorie needs.  This information is not intended to replace advice given to you by your health care provider. Make sure you discuss any questions you have with your health care provider.  Document Revised: 11/20/2020 Document Reviewed: 11/20/2020  Elsevier Patient Education © 2021 Elsevier Inc.

## 2022-10-06 ENCOUNTER — TELEPHONE (OUTPATIENT)
Dept: NEUROSURGERY | Age: 42
End: 2022-10-06

## 2022-10-06 DIAGNOSIS — G56.20 LESION OF ULNAR NERVE, UNSPECIFIED LATERALITY: Primary | ICD-10-CM

## 2022-10-11 NOTE — TELEPHONE ENCOUNTER
1st attempt to contact patient.  I WAS UNABLE TO LEAVE a voicemail instructing patient to call back at [phone] to schedule their new patient appointment     SECOND OPINION

## 2022-10-12 ENCOUNTER — TRANSCRIBE ORDERS (OUTPATIENT)
Dept: ADMINISTRATIVE | Facility: HOSPITAL | Age: 42
End: 2022-10-12

## 2022-10-12 DIAGNOSIS — S44.01XA INJURY OF RIGHT ULNAR NERVE AT UPPER ARM LEVEL, INITIAL ENCOUNTER: Primary | ICD-10-CM

## 2022-10-18 NOTE — TELEPHONE ENCOUNTER
[facility] Neurosurgery New Patient Questionnaire    Diagnosis/Reason for Referral?    Lesion of ulnar nerve, unspecified laterality    2. Who is completing questionnaire? Patient  Caregiver Family      3. Has the patient had any previous spinal/brain surgeries? BACK SURGERY 20 YEARS AGO      A. If yes, what is the name of the facility in which the surgery was performed? [facility]     B. Procedure/Surgery performed? C. Who was the surgeon? MADALYN COLBERT. When was the surgery? 21 YEARS AGO       E. Did the patient improve after the surgery? YES      4. Is this a second opinion? If yes, Dr. Lizandro Rutledge would like to review patient first before making the appointment. NO    5. Have MRI Images been obtain within the last year? Yes  No      XR  CT     If yes, where was the imaging performed? [facility]   If yes, what part of the body? Lumbar  Cervical  Thoracic  Brain     If yes, when was it obtained? 8/31/22    Note: if the scan was performed at a facility other than [facility], the disc will need to be brought to the appointment or we need to reach out to obtain the disc. A. Was the patient instructed to provide the disc? Yes   No      8. Has the patient had a NCV/EMG within the last year? Yes  No     If yes, where was it performed and date? 7/26/22    [facility] Location:      9. Has the patient been to Physical Therapy? Yes  No     If yes, what location, how long attended, and last visit? Location:        Therapy Lasted:    Date of Last Visit:      10. Has the patient been to Pain Management? Yes  No     If yes, what location and last visit     Location:   Last Visit:   Is it helping?

## 2022-11-28 ENCOUNTER — APPOINTMENT (OUTPATIENT)
Dept: CT IMAGING | Facility: HOSPITAL | Age: 42
End: 2022-11-28

## 2022-11-28 ENCOUNTER — HOSPITAL ENCOUNTER (EMERGENCY)
Facility: HOSPITAL | Age: 42
Discharge: HOME | End: 2022-11-28
Admitting: EMERGENCY MEDICINE

## 2022-11-28 VITALS
HEART RATE: 81 BPM | WEIGHT: 315 LBS | BODY MASS INDEX: 38.36 KG/M2 | SYSTOLIC BLOOD PRESSURE: 147 MMHG | TEMPERATURE: 97.9 F | DIASTOLIC BLOOD PRESSURE: 102 MMHG | RESPIRATION RATE: 18 BRPM | HEIGHT: 76 IN | OXYGEN SATURATION: 99 %

## 2022-11-28 DIAGNOSIS — K76.0 HEPATIC STEATOSIS: ICD-10-CM

## 2022-11-28 DIAGNOSIS — R91.8 PULMONARY NODULES: ICD-10-CM

## 2022-11-28 DIAGNOSIS — R10.9 LEFT FLANK PAIN: Primary | ICD-10-CM

## 2022-11-28 LAB
ALBUMIN SERPL-MCNC: 4.6 G/DL (ref 3.5–5.2)
ALBUMIN/GLOB SERPL: 1.6 G/DL
ALP SERPL-CCNC: 62 U/L (ref 39–117)
ALT SERPL W P-5'-P-CCNC: 44 U/L (ref 1–41)
ANION GAP SERPL CALCULATED.3IONS-SCNC: 11 MMOL/L (ref 5–15)
AST SERPL-CCNC: 24 U/L (ref 1–40)
BILIRUB SERPL-MCNC: 0.4 MG/DL (ref 0–1.2)
BILIRUB UR QL STRIP: NEGATIVE
BUN SERPL-MCNC: 15 MG/DL (ref 6–20)
BUN/CREAT SERPL: 15.5 (ref 7–25)
CALCIUM SPEC-SCNC: 9.4 MG/DL (ref 8.6–10.5)
CHLORIDE SERPL-SCNC: 103 MMOL/L (ref 98–107)
CLARITY UR: CLEAR
CO2 SERPL-SCNC: 25 MMOL/L (ref 22–29)
COLOR UR: ABNORMAL
CREAT SERPL-MCNC: 0.97 MG/DL (ref 0.76–1.27)
CRP SERPL-MCNC: 0.9 MG/DL (ref 0–0.5)
D-LACTATE SERPL-SCNC: 0.8 MMOL/L (ref 0.5–2)
DEPRECATED RDW RBC AUTO: 43.5 FL (ref 37–54)
EGFRCR SERPLBLD CKD-EPI 2021: 100 ML/MIN/1.73
EOSINOPHIL # BLD MANUAL: 0.5 10*3/MM3 (ref 0–0.4)
EOSINOPHIL NFR BLD MANUAL: 3.1 % (ref 0.3–6.2)
ERYTHROCYTE [DISTWIDTH] IN BLOOD BY AUTOMATED COUNT: 13.5 % (ref 12.3–15.4)
GIANT PLATELETS: ABNORMAL
GLOBULIN UR ELPH-MCNC: 2.8 GM/DL
GLUCOSE SERPL-MCNC: 149 MG/DL (ref 65–99)
GLUCOSE UR STRIP-MCNC: ABNORMAL MG/DL
HCT VFR BLD AUTO: 51.1 % (ref 37.5–51)
HGB BLD-MCNC: 16.8 G/DL (ref 13–17.7)
HGB UR QL STRIP.AUTO: NEGATIVE
HOLD SPECIMEN: NORMAL
HOLD SPECIMEN: NORMAL
KETONES UR QL STRIP: ABNORMAL
LEUKOCYTE ESTERASE UR QL STRIP.AUTO: NEGATIVE
LIPASE SERPL-CCNC: 24 U/L (ref 13–60)
LYMPHOCYTES # BLD MANUAL: 3.19 10*3/MM3 (ref 0.7–3.1)
LYMPHOCYTES NFR BLD MANUAL: 10.3 % (ref 5–12)
MCH RBC QN AUTO: 28.9 PG (ref 26.6–33)
MCHC RBC AUTO-ENTMCNC: 32.9 G/DL (ref 31.5–35.7)
MCV RBC AUTO: 88 FL (ref 79–97)
MONOCYTES # BLD: 1.68 10*3/MM3 (ref 0.1–0.9)
NEUTROPHILS # BLD AUTO: 10.91 10*3/MM3 (ref 1.7–7)
NEUTROPHILS NFR BLD MANUAL: 67 % (ref 42.7–76)
NITRITE UR QL STRIP: NEGATIVE
PH UR STRIP.AUTO: 6 [PH] (ref 5–8)
PLATELET # BLD AUTO: 198 10*3/MM3 (ref 140–450)
PMV BLD AUTO: 13.1 FL (ref 6–12)
POTASSIUM SERPL-SCNC: 4.5 MMOL/L (ref 3.5–5.2)
PROT SERPL-MCNC: 7.4 G/DL (ref 6–8.5)
PROT UR QL STRIP: NEGATIVE
RBC # BLD AUTO: 5.81 10*6/MM3 (ref 4.14–5.8)
RBC MORPH BLD: NORMAL
SODIUM SERPL-SCNC: 139 MMOL/L (ref 136–145)
SP GR UR STRIP: 1.02 (ref 1–1.03)
UROBILINOGEN UR QL STRIP: ABNORMAL
VARIANT LYMPHS NFR BLD MANUAL: 13.4 % (ref 19.6–45.3)
VARIANT LYMPHS NFR BLD MANUAL: 6.2 % (ref 0–5)
WBC MORPH BLD: NORMAL
WBC NRBC COR # BLD: 16.28 10*3/MM3 (ref 3.4–10.8)
WHOLE BLOOD HOLD COAG: NORMAL
WHOLE BLOOD HOLD SPECIMEN: NORMAL

## 2022-11-28 PROCEDURE — 81003 URINALYSIS AUTO W/O SCOPE: CPT

## 2022-11-28 PROCEDURE — 85025 COMPLETE CBC W/AUTO DIFF WBC: CPT

## 2022-11-28 PROCEDURE — 96375 TX/PRO/DX INJ NEW DRUG ADDON: CPT

## 2022-11-28 PROCEDURE — 80053 COMPREHEN METABOLIC PANEL: CPT

## 2022-11-28 PROCEDURE — 99283 EMERGENCY DEPT VISIT LOW MDM: CPT

## 2022-11-28 PROCEDURE — 85007 BL SMEAR W/DIFF WBC COUNT: CPT

## 2022-11-28 PROCEDURE — 25010000002 ONDANSETRON PER 1 MG

## 2022-11-28 PROCEDURE — 96374 THER/PROPH/DIAG INJ IV PUSH: CPT

## 2022-11-28 PROCEDURE — 83690 ASSAY OF LIPASE: CPT

## 2022-11-28 PROCEDURE — 25010000002 MORPHINE PER 10 MG: Performed by: EMERGENCY MEDICINE

## 2022-11-28 PROCEDURE — 74176 CT ABD & PELVIS W/O CONTRAST: CPT

## 2022-11-28 PROCEDURE — 71275 CT ANGIOGRAPHY CHEST: CPT

## 2022-11-28 PROCEDURE — 83605 ASSAY OF LACTIC ACID: CPT

## 2022-11-28 PROCEDURE — 86140 C-REACTIVE PROTEIN: CPT

## 2022-11-28 PROCEDURE — 87086 URINE CULTURE/COLONY COUNT: CPT

## 2022-11-28 PROCEDURE — 25010000002 KETOROLAC TROMETHAMINE PER 15 MG

## 2022-11-28 PROCEDURE — 0 IOPAMIDOL PER 1 ML

## 2022-11-28 RX ORDER — ONDANSETRON 2 MG/ML
4 INJECTION INTRAMUSCULAR; INTRAVENOUS ONCE
Status: COMPLETED | OUTPATIENT
Start: 2022-11-28 | End: 2022-11-28

## 2022-11-28 RX ORDER — KETOROLAC TROMETHAMINE 30 MG/ML
30 INJECTION, SOLUTION INTRAMUSCULAR; INTRAVENOUS ONCE
Status: COMPLETED | OUTPATIENT
Start: 2022-11-28 | End: 2022-11-28

## 2022-11-28 RX ORDER — SODIUM CHLORIDE 0.9 % (FLUSH) 0.9 %
10 SYRINGE (ML) INJECTION AS NEEDED
Status: DISCONTINUED | OUTPATIENT
Start: 2022-11-28 | End: 2022-11-28 | Stop reason: HOSPADM

## 2022-11-28 RX ADMIN — KETOROLAC TROMETHAMINE 30 MG: 30 INJECTION, SOLUTION INTRAMUSCULAR; INTRAVENOUS at 12:49

## 2022-11-28 RX ADMIN — MORPHINE SULFATE 4 MG: 4 INJECTION, SOLUTION INTRAMUSCULAR; INTRAVENOUS at 13:46

## 2022-11-28 RX ADMIN — IOPAMIDOL 100 ML: 755 INJECTION, SOLUTION INTRAVENOUS at 14:27

## 2022-11-28 RX ADMIN — ONDANSETRON 4 MG: 2 INJECTION INTRAMUSCULAR; INTRAVENOUS at 12:49

## 2022-11-29 LAB — BACTERIA SPEC AEROBE CULT: NO GROWTH

## 2022-12-04 ENCOUNTER — HOSPITAL ENCOUNTER (EMERGENCY)
Facility: HOSPITAL | Age: 42
Discharge: HOME | End: 2022-12-05
Attending: STUDENT IN AN ORGANIZED HEALTH CARE EDUCATION/TRAINING PROGRAM | Admitting: STUDENT IN AN ORGANIZED HEALTH CARE EDUCATION/TRAINING PROGRAM

## 2022-12-04 VITALS
TEMPERATURE: 97.6 F | HEART RATE: 95 BPM | HEIGHT: 76 IN | SYSTOLIC BLOOD PRESSURE: 160 MMHG | RESPIRATION RATE: 18 BRPM | BODY MASS INDEX: 38.36 KG/M2 | WEIGHT: 315 LBS | OXYGEN SATURATION: 96 % | DIASTOLIC BLOOD PRESSURE: 89 MMHG

## 2022-12-04 DIAGNOSIS — M54.30 SCIATICA, UNSPECIFIED LATERALITY: Primary | ICD-10-CM

## 2022-12-04 PROCEDURE — 96374 THER/PROPH/DIAG INJ IV PUSH: CPT

## 2022-12-04 PROCEDURE — 99283 EMERGENCY DEPT VISIT LOW MDM: CPT

## 2022-12-04 PROCEDURE — 96375 TX/PRO/DX INJ NEW DRUG ADDON: CPT

## 2022-12-05 ENCOUNTER — APPOINTMENT (OUTPATIENT)
Dept: CT IMAGING | Facility: HOSPITAL | Age: 42
End: 2022-12-05

## 2022-12-05 ENCOUNTER — TRANSCRIBE ORDERS (OUTPATIENT)
Dept: ADMINISTRATIVE | Facility: HOSPITAL | Age: 42
End: 2022-12-05

## 2022-12-05 DIAGNOSIS — R06.00 DYSPNEA, UNSPECIFIED TYPE: Primary | ICD-10-CM

## 2022-12-05 LAB
ALBUMIN SERPL-MCNC: 4.5 G/DL (ref 3.5–5.2)
ALBUMIN/GLOB SERPL: 1.7 G/DL
ALP SERPL-CCNC: 57 U/L (ref 39–117)
ALT SERPL W P-5'-P-CCNC: 41 U/L (ref 1–41)
ANION GAP SERPL CALCULATED.3IONS-SCNC: 11 MMOL/L (ref 5–15)
AST SERPL-CCNC: 23 U/L (ref 1–40)
BILIRUB SERPL-MCNC: 0.3 MG/DL (ref 0–1.2)
BILIRUB UR QL STRIP: NEGATIVE
BUN SERPL-MCNC: 20 MG/DL (ref 6–20)
BUN/CREAT SERPL: 17.7 (ref 7–25)
CALCIUM SPEC-SCNC: 9 MG/DL (ref 8.6–10.5)
CHLORIDE SERPL-SCNC: 104 MMOL/L (ref 98–107)
CLARITY UR: CLEAR
CO2 SERPL-SCNC: 25 MMOL/L (ref 22–29)
COLOR UR: YELLOW
CREAT SERPL-MCNC: 1.13 MG/DL (ref 0.76–1.27)
DEPRECATED RDW RBC AUTO: 44.1 FL (ref 37–54)
EGFRCR SERPLBLD CKD-EPI 2021: 83.2 ML/MIN/1.73
EOSINOPHIL # BLD MANUAL: 0.25 10*3/MM3 (ref 0–0.4)
EOSINOPHIL NFR BLD MANUAL: 2 % (ref 0.3–6.2)
ERYTHROCYTE [DISTWIDTH] IN BLOOD BY AUTOMATED COUNT: 13.6 % (ref 12.3–15.4)
GIANT PLATELETS: ABNORMAL
GLOBULIN UR ELPH-MCNC: 2.7 GM/DL
GLUCOSE SERPL-MCNC: 135 MG/DL (ref 65–99)
GLUCOSE UR STRIP-MCNC: NEGATIVE MG/DL
HCT VFR BLD AUTO: 48.2 % (ref 37.5–51)
HGB BLD-MCNC: 15.6 G/DL (ref 13–17.7)
HGB UR QL STRIP.AUTO: NEGATIVE
KETONES UR QL STRIP: ABNORMAL
LEUKOCYTE ESTERASE UR QL STRIP.AUTO: NEGATIVE
LIPASE SERPL-CCNC: 23 U/L (ref 13–60)
LYMPHOCYTES # BLD MANUAL: 3.26 10*3/MM3 (ref 0.7–3.1)
LYMPHOCYTES NFR BLD MANUAL: 8.1 % (ref 5–12)
MCH RBC QN AUTO: 28.9 PG (ref 26.6–33)
MCHC RBC AUTO-ENTMCNC: 32.4 G/DL (ref 31.5–35.7)
MCV RBC AUTO: 89.3 FL (ref 79–97)
MONOCYTES # BLD: 1.01 10*3/MM3 (ref 0.1–0.9)
NEUTROPHILS # BLD AUTO: 7.91 10*3/MM3 (ref 1.7–7)
NEUTROPHILS NFR BLD MANUAL: 63.6 % (ref 42.7–76)
NITRITE UR QL STRIP: NEGATIVE
NRBC BLD AUTO-RTO: 0 /100 WBC (ref 0–0.2)
PH UR STRIP.AUTO: 6 [PH] (ref 5–8)
PLATELET # BLD AUTO: 178 10*3/MM3 (ref 140–450)
PMV BLD AUTO: 12 FL (ref 6–12)
POTASSIUM SERPL-SCNC: 4.5 MMOL/L (ref 3.5–5.2)
PROT SERPL-MCNC: 7.2 G/DL (ref 6–8.5)
PROT UR QL STRIP: NEGATIVE
RBC # BLD AUTO: 5.4 10*6/MM3 (ref 4.14–5.8)
RBC MORPH BLD: NORMAL
SODIUM SERPL-SCNC: 140 MMOL/L (ref 136–145)
SP GR UR STRIP: 1.03 (ref 1–1.03)
UROBILINOGEN UR QL STRIP: ABNORMAL
VARIANT LYMPHS NFR BLD MANUAL: 23.2 % (ref 19.6–45.3)
VARIANT LYMPHS NFR BLD MANUAL: 3 % (ref 0–5)
WBC MORPH BLD: NORMAL
WBC NRBC COR # BLD: 12.44 10*3/MM3 (ref 3.4–10.8)

## 2022-12-05 PROCEDURE — 80053 COMPREHEN METABOLIC PANEL: CPT | Performed by: NURSE PRACTITIONER

## 2022-12-05 PROCEDURE — 25010000002 ONDANSETRON PER 1 MG: Performed by: NURSE PRACTITIONER

## 2022-12-05 PROCEDURE — 25010000002 KETOROLAC TROMETHAMINE PER 15 MG: Performed by: NURSE PRACTITIONER

## 2022-12-05 PROCEDURE — 74176 CT ABD & PELVIS W/O CONTRAST: CPT

## 2022-12-05 PROCEDURE — 85025 COMPLETE CBC W/AUTO DIFF WBC: CPT | Performed by: NURSE PRACTITIONER

## 2022-12-05 PROCEDURE — 96374 THER/PROPH/DIAG INJ IV PUSH: CPT

## 2022-12-05 PROCEDURE — 85007 BL SMEAR W/DIFF WBC COUNT: CPT | Performed by: NURSE PRACTITIONER

## 2022-12-05 PROCEDURE — 96375 TX/PRO/DX INJ NEW DRUG ADDON: CPT

## 2022-12-05 PROCEDURE — 81003 URINALYSIS AUTO W/O SCOPE: CPT | Performed by: NURSE PRACTITIONER

## 2022-12-05 PROCEDURE — 83690 ASSAY OF LIPASE: CPT | Performed by: NURSE PRACTITIONER

## 2022-12-05 RX ORDER — KETOROLAC TROMETHAMINE 15 MG/ML
15 INJECTION, SOLUTION INTRAMUSCULAR; INTRAVENOUS ONCE
Status: COMPLETED | OUTPATIENT
Start: 2022-12-05 | End: 2022-12-05

## 2022-12-05 RX ORDER — ONDANSETRON 2 MG/ML
4 INJECTION INTRAMUSCULAR; INTRAVENOUS ONCE
Status: COMPLETED | OUTPATIENT
Start: 2022-12-05 | End: 2022-12-05

## 2022-12-05 RX ORDER — HYDROCODONE BITARTRATE AND ACETAMINOPHEN 5; 325 MG/1; MG/1
1 TABLET ORAL 4 TIMES DAILY PRN
Qty: 9 TABLET | Refills: 0 | Status: SHIPPED | OUTPATIENT
Start: 2022-12-05 | End: 2022-12-08

## 2022-12-05 RX ADMIN — SODIUM CHLORIDE 500 ML: 9 INJECTION, SOLUTION INTRAVENOUS at 00:34

## 2022-12-05 RX ADMIN — KETOROLAC TROMETHAMINE 15 MG: 15 INJECTION, SOLUTION INTRAMUSCULAR; INTRAVENOUS at 00:32

## 2022-12-05 RX ADMIN — ONDANSETRON 4 MG: 2 INJECTION INTRAMUSCULAR; INTRAVENOUS at 00:32

## 2022-12-05 NOTE — ED PROVIDER NOTES
Subjective   History of Present Illness  Patient is a 42-year-old male who presents to the ER with complaints of left flank pain.  He states he has had constant pain to his left kidney region for approximately a week.  He denies any known injury.  He denies any dysuria however has noted dark urine.  He denies any fevers, nausea, or vomiting.  He has had no history of kidney stones.  However due to continued symptoms he came to the ER for evaluation and treatment.        Review of Systems   Constitutional: Negative.  Negative for fever.   HENT: Negative.  Negative for congestion.    Respiratory: Negative.  Negative for cough and shortness of breath.    Cardiovascular: Negative.  Negative for chest pain.   Gastrointestinal: Negative.  Negative for abdominal pain, diarrhea, nausea and vomiting.   Genitourinary: Positive for flank pain. Negative for difficulty urinating, dysuria and hematuria.   Musculoskeletal: Positive for back pain.   Skin: Negative.  Negative for wound.   All other systems reviewed and are negative.      Past Medical History:   Diagnosis Date   • GERD (gastroesophageal reflux disease)    • Hypertension    • Low back pain        No Known Allergies    Past Surgical History:   Procedure Laterality Date   • BACK SURGERY         Family History   Problem Relation Age of Onset   • Cancer Mother    • Diabetes Father    • Hypertension Father        Social History     Socioeconomic History   • Marital status: Single   Tobacco Use   • Smoking status: Every Day     Packs/day: 1.00     Types: Cigarettes   • Smokeless tobacco: Never   Substance and Sexual Activity   • Drug use: Never   • Sexual activity: Yes     Partners: Female           Objective   Physical Exam  Vitals and nursing note reviewed.   Constitutional:       General: He is not in acute distress.     Appearance: He is well-developed. He is not diaphoretic.   HENT:      Head: Normocephalic and atraumatic.      Right Ear: External ear normal.      Left  Ear: External ear normal.      Nose: Nose normal.      Mouth/Throat:      Pharynx: Oropharynx is clear.   Eyes:      General: No scleral icterus.     Extraocular Movements: Extraocular movements intact.      Conjunctiva/sclera: Conjunctivae normal.   Neck:      Thyroid: No thyromegaly.      Vascular: No JVD.   Cardiovascular:      Rate and Rhythm: Normal rate and regular rhythm.      Heart sounds: Normal heart sounds. No murmur heard.  Pulmonary:      Effort: Pulmonary effort is normal. No respiratory distress.      Breath sounds: Normal breath sounds. No wheezing or rales.   Chest:      Chest wall: No tenderness.   Abdominal:      General: Bowel sounds are normal. There is no distension.      Palpations: Abdomen is soft. There is no mass.      Tenderness: There is no abdominal tenderness. There is left CVA tenderness. There is no guarding or rebound.   Musculoskeletal:         General: Normal range of motion.      Cervical back: Normal range of motion and neck supple.   Lymphadenopathy:      Cervical: No cervical adenopathy.   Skin:     General: Skin is warm and dry.      Capillary Refill: Capillary refill takes less than 2 seconds.      Coloration: Skin is not pale.      Findings: No erythema or rash.   Neurological:      General: No focal deficit present.      Mental Status: He is alert and oriented to person, place, and time.      Cranial Nerves: No cranial nerve deficit.      Coordination: Coordination normal.      Deep Tendon Reflexes: Reflexes are normal and symmetric.   Psychiatric:         Mood and Affect: Mood normal.         Behavior: Behavior normal.         Thought Content: Thought content normal.         Judgment: Judgment normal.         Procedures           ED Course  ED Course as of 12/12/22 0022   Mon Dec 05, 2022   0224 Work up remains pending. This will be a turn over for Dr. Conley. [TW]   0500 Ambulated without difficulty. No acute findings to explain his symptoms. I went over the workup and  results so far with the patient.     I told him that if his symptoms get worse he must come back.     I answered all him questions regarding the emergency department evaluation, diagnosis, and treatment plan in plain and simple language that he was able to understand.     I told him that there is always some diagnostic uncertainty in the ER and the fact that him symptoms may change or reveal themselves further after being discharged. Because of this, I told him that it is VERY IMPORTANT that he follows up (by calling to set up an appointment) with him primary care doctor within the next few days or as soon as reasonably possible so that he can be re-evaluated for improvement in her symptoms or for any other questions.     I also gave him common sense return precautions and told him to return to the emergency department within 24 - 48hrs if he has any new, worsening, or concerning symptoms.    he verbalized understanding of the discharge instructions and agreed with them.    he was discharged in stable condition and was observed ambulating out of the ER.    [NP]      ED Course User Index  [NP] Tyler Conley MD  [TW] Glenny James APRN                                           [facility]    Final diagnoses:   Sciatica, unspecified laterality       ED Disposition  ED Disposition     ED Disposition   Discharge    Condition   Stable    Comment   --             Edie Newman, APRN  [address] [id number] [phone]    Call in 1 day  As needed, If symptoms worsen         Medication List      Stop    HYDROcodone-acetaminophen 7.5-325 MG per tablet  Commonly known as: NORCO        ASK your doctor about these medications    HYDROcodone-acetaminophen 5-325 MG per tablet  Commonly known as: NORCO  Take 1 tablet by mouth 4 (Four) Times a Day As Needed for Mild Pain for up to 3 days.  Ask about: Should I take this medication?           Where to Get Your Medications      These medications were sent to "[facility]"  - [place], [place] - [address] - [phone]  - [phone] FX  [address] [place] [address] [facility] [address]    Phone: [phone]   · HYDROcodone-acetaminophen 5-325 MG per tablet          Tyler Conley MD  12/12/22 0022

## 2022-12-05 NOTE — DISCHARGE INSTRUCTIONS
It was very nice to meet you, Miguel. Thank you for allowing us to take care of you today at [facility].    Your evaluation today did not show any emergent findings or have any emergent indications for admission to the hospital.     Please understand that an ER evaluation is just the start of your evaluation. We will do what we can, but we are often unable to fully figure out what is causing your symptoms from one evaluation. Thus, our primary goal is to determine whether you need to be evaluated in the hospital or if it is safe for you to go home and see other doctors such as a primary care physician or a specialist on an outpatient basis.     Like we discussed, it is VERY IMPORTANT that you follow up with your primary care doctor (call them to set up an appointment) within the next few days or as soon as possible so that you can be re-evaluated for improvement in your symptoms or for any other questions.     A copy of your results should be included in your paperwork. If you were prescribed any medications, please take them as directed or call us back with any questions.    Please return to the emergency room within 12-48 hours if you experience fever, chills, chest pain or shortness of breath, pain with inspiration/expiration, pain that travels to your arms, neck or back, nausea, vomiting, severe headache, tearing pain in your chest, dizziness, feel as though you are about to pass out, have any worsening symptoms, or any other concerns.    *IMPORTANT INFORMATION REGARDING XRAYS AND CT SCANS*  Please keep in mind that at nighttime we do not have an in-house radiologist and use a tele-radiology service. This means that while they provide us with information on emergent findings or important acute findings, they may not report to us ALL of the other smaller, yet still important to know, findings that may be there on your imaging studies. While we will try our best to inform you about any incidental  findings or abnormal findings that require follow up, please follow up with your primary care provider to have your imaging studies reviewed formally and have any abnormal findings addressed.

## 2022-12-12 ENCOUNTER — HOSPITAL ENCOUNTER (OUTPATIENT)
Dept: PULMONOLOGY | Facility: HOSPITAL | Age: 42
Discharge: HOME | End: 2022-12-12
Admitting: NURSE PRACTITIONER

## 2022-12-12 DIAGNOSIS — R06.00 DYSPNEA, UNSPECIFIED TYPE: ICD-10-CM

## 2022-12-12 PROCEDURE — 94729 DIFFUSING CAPACITY: CPT | Performed by: INTERNAL MEDICINE

## 2022-12-12 PROCEDURE — 94729 DIFFUSING CAPACITY: CPT

## 2022-12-12 PROCEDURE — 94726 PLETHYSMOGRAPHY LUNG VOLUMES: CPT

## 2022-12-12 PROCEDURE — 94726 PLETHYSMOGRAPHY LUNG VOLUMES: CPT | Performed by: INTERNAL MEDICINE

## 2022-12-12 PROCEDURE — 94010 BREATHING CAPACITY TEST: CPT | Performed by: INTERNAL MEDICINE

## 2022-12-12 PROCEDURE — 94010 BREATHING CAPACITY TEST: CPT

## 2022-12-14 ENCOUNTER — DOCUMENTATION (OUTPATIENT)
Dept: CT IMAGING | Facility: HOSPITAL | Age: 42
End: 2022-12-14

## 2022-12-14 ENCOUNTER — APPOINTMENT (OUTPATIENT)
Dept: PULMONOLOGY | Facility: HOSPITAL | Age: 42
End: 2022-12-14

## 2022-12-14 NOTE — PROGRESS NOTES
A notification letter was sent to the patient explaining that a recent imaging exam showed an incidental finding, for which follow-up testing was recommended.  Unable to leave vmail. Routed to PCP

## 2023-01-23 ENCOUNTER — HOSPITAL ENCOUNTER (EMERGENCY)
Facility: HOSPITAL | Age: 43
Discharge: LEFT BEFORE DISCHARGE | End: 2023-01-23
Payer: COMMERCIAL

## 2023-01-23 VITALS
RESPIRATION RATE: 16 BRPM | HEIGHT: 75 IN | DIASTOLIC BLOOD PRESSURE: 94 MMHG | TEMPERATURE: 98 F | HEART RATE: 92 BPM | WEIGHT: 315 LBS | OXYGEN SATURATION: 97 % | SYSTOLIC BLOOD PRESSURE: 157 MMHG | BODY MASS INDEX: 39.17 KG/M2

## 2023-01-23 PROCEDURE — 99211 OFF/OP EST MAY X REQ PHY/QHP: CPT

## 2023-02-09 ENCOUNTER — TELEPHONE (OUTPATIENT)
Dept: NEUROSURGERY | Age: 43
End: 2023-02-09

## 2023-02-09 NOTE — TELEPHONE ENCOUNTER
[facility] New Patient Questionnaire    Diagnosis/Reason for Referral?    Cervicalgia    2. Who is completing questionnaire? Patient  Caregiver Family      3. Has the patient had any previous spinal/brain surgeries? BACK SURGERY 20 YEARS AGO      A. If yes, what is the name of the facility in which the [facility] was performed? [facility]     B. Procedure/Surgery performed? C. Who was the surgeon? MADALYN FLEMING     D. When was the surgery? 21 YEARS AGO       E. Did the patient improve after the surgery? YES      4. Is this a second opinion? If yes, Dr. Dimas He would like to review patient first before making the appointment. NO    5. Have MRI Images been obtain within the last year? Yes  No      XR  CT     If yes, where was the imaging performed? [facility]   If yes, what part of the body? Lumbar  Cervical  Thoracic  Brain     If yes, when was it obtained? 8/31/2022    Note: if the scan was performed at a facility other than [facility], the disc will need to be brought to the appointment or we need to reach out to obtain the disc. A. Was the patient instructed to provide the disc? Yes   No      8. Has the patient had a NCV/EMG within the last year? Yes  No     If yes, where was it performed and date? 7/26/22 Location:[facility]      9. Has the patient been to Physical Therapy? Yes  No     If yes, what location, how long attended, and last visit? Location:        Therapy Lasted:    Date of Last Visit:      10. Has the patient been to Pain Management? Yes  No     If yes, what location and last visit     Location:   Last Visit:   Is it helping?

## 2023-02-27 ENCOUNTER — OFFICE VISIT (OUTPATIENT)
Dept: NEUROSURGERY | Age: 43
End: 2023-02-27
Payer: COMMERCIAL

## 2023-02-27 VITALS
SYSTOLIC BLOOD PRESSURE: 116 MMHG | RESPIRATION RATE: 18 BRPM | HEART RATE: 89 BPM | BODY MASS INDEX: 38.36 KG/M2 | OXYGEN SATURATION: 97 % | WEIGHT: 315 LBS | DIASTOLIC BLOOD PRESSURE: 86 MMHG | HEIGHT: 76 IN

## 2023-02-27 DIAGNOSIS — M79.601 BILATERAL ARM PAIN: ICD-10-CM

## 2023-02-27 DIAGNOSIS — M79.602 BILATERAL ARM PAIN: ICD-10-CM

## 2023-02-27 DIAGNOSIS — M54.2 NECK PAIN: ICD-10-CM

## 2023-02-27 DIAGNOSIS — M43.12 SPONDYLOLISTHESIS OF CERVICAL REGION: ICD-10-CM

## 2023-02-27 DIAGNOSIS — R20.0 NUMBNESS OF RIGHT HAND: ICD-10-CM

## 2023-02-27 DIAGNOSIS — M50.30 DDD (DEGENERATIVE DISC DISEASE), CERVICAL: Primary | ICD-10-CM

## 2023-02-27 PROCEDURE — 99204 OFFICE O/P NEW MOD 45 MIN: CPT | Performed by: NURSE PRACTITIONER

## 2023-02-27 RX ORDER — TEMAZEPAM 15 MG/1
15 CAPSULE ORAL DAILY
COMMUNITY
Start: 2023-01-27

## 2023-02-27 RX ORDER — METHOCARBAMOL 750 MG/1
750 TABLET, FILM COATED ORAL 3 TIMES DAILY PRN
Qty: 90 TABLET | Refills: 1 | Status: SHIPPED | OUTPATIENT
Start: 2023-02-27 | End: 2023-03-29

## 2023-02-27 RX ORDER — CLONIDINE HYDROCHLORIDE 0.1 MG/1
0.1 TABLET ORAL DAILY
COMMUNITY

## 2023-02-27 RX ORDER — ALBUTEROL SULFATE 90 UG/1
AEROSOL, METERED RESPIRATORY (INHALATION)
COMMUNITY
Start: 2023-02-25

## 2023-02-27 RX ORDER — OMEPRAZOLE 40 MG/1
40 CAPSULE, DELAYED RELEASE ORAL DAILY
COMMUNITY
Start: 2023-01-02

## 2023-02-27 RX ORDER — FUROSEMIDE 40 MG/1
40 TABLET ORAL DAILY
COMMUNITY

## 2023-02-27 RX ORDER — LOSARTAN POTASSIUM 100 MG/1
100 TABLET ORAL DAILY
COMMUNITY
Start: 2023-01-02

## 2023-02-27 RX ORDER — METOPROLOL SUCCINATE 25 MG/1
25 TABLET, EXTENDED RELEASE ORAL DAILY
COMMUNITY
Start: 2023-01-02

## 2023-02-27 RX ORDER — HYDROCODONE BITARTRATE AND ACETAMINOPHEN 10; 325 MG/1; MG/1
TABLET ORAL
COMMUNITY
Start: 2023-01-28

## 2023-02-27 RX ORDER — BUDESONIDE AND FORMOTEROL FUMARATE DIHYDRATE 160; 4.5 UG/1; UG/1
AEROSOL RESPIRATORY (INHALATION)
COMMUNITY
Start: 2023-01-13

## 2023-02-27 ASSESSMENT — ENCOUNTER SYMPTOMS
EYES NEGATIVE: 1
RESPIRATORY NEGATIVE: 1
GASTROINTESTINAL NEGATIVE: 1

## 2023-02-27 NOTE — PROGRESS NOTES
[facility]  Office Visit      Chief Complaint   Patient presents with    New Patient     Establishing care     Results     Imaging in Nucleus     Neck Pain     Patient states he has had pain for a few years and feels it is worsening. He states occasionally pain radiates into his BUE. He is taking Lortabs from his PCP to help manage the pain. Numbness     Patient states he does have numbness/tingling in his BUE that comes and goes. Headache     Patient states he does have headaches often. HISTORY OF PRESENT ILLNESS:    Sary Soto is a 43 y.o. male with a history of previous lumbar spine surgery per Dr. Kasia Murphy 20+  years ago who presents today with neck pain that has been present for several years but worsening. The pain does radiate into the BUE R>L triceps, forearm, and into the hand. His pain is mostly located in the arms. The patient complains of numbness of the first 4 digits on the RIGHT. He does have numbness in the fingertips, trouble using hands to perform fine motor tasks or ataxia. Raising arms above head makes the pain worse. 70% arm pains and 30% neck pain. The patient has underwent a non-operative treatment course that has included:  NSAIDs (naproxen)  Muscle Relaxers (tizanidine)  Opiates (Norco 10 BID per Liat MARCELINO)  IM steroid      Of note he does use tobacco and does not take blood thinning medications. History reviewed. No pertinent past medical history. Past Surgical History:   Procedure Laterality Date    BACK SURGERY         Current Outpatient Medications   Medication Sig Dispense Refill    albuterol sulfate HFA (PROVENTIL;VENTOLIN;PROAIR) 108 (90 Base) MCG/ACT inhaler INHALE 2 PUFFS EVERY 4 -6 HOURS AS NEEDED FOR COUGH, CONGESTION SHORTNESS OF AIR      budesonide-formoterol (SYMBICORT) 160-4.5 MCG/ACT AERO INHALE 2 PUFFS TWO TIMES A DAY.  RINSE MOUTH AFTER EACH USE.      cloNIDine (CATAPRES) 0.1 MG tablet Take 0.1 mg by mouth daily      furosemide (LASIX) 40 MG tablet Take 40 mg by mouth daily      HYDROcodone-acetaminophen (NORCO)  MG per tablet TAKE 1 TABLET BY MOUTH TWO TIMES A DAY      losartan (COZAAR) 100 MG tablet Take 100 mg by mouth daily      metoprolol succinate (TOPROL XL) 25 MG extended release tablet Take 25 mg by mouth daily      omeprazole (PRILOSEC) 40 MG delayed release capsule Take 40 mg by mouth daily      temazepam (RESTORIL) 15 MG capsule Take 15 mg by mouth daily. methocarbamol (ROBAXIN-750) 750 MG tablet Take 1 tablet by mouth 3 times daily as needed (spasm) 90 tablet 1    traZODone (DESYREL) 150 MG tablet Take 150 mg by mouth nightly      traMADol (ULTRAM) 50 MG tablet Take 50 mg by mouth every 6 hours as needed for Pain. (Patient not taking: Reported on 2/27/2023)      naproxen (NAPROSYN) 500 MG tablet Take 1 tablet by mouth 2 times daily as needed for Pain (Patient not taking: Reported on 2/27/2023) 60 tablet 0     No current facility-administered medications for this visit. Allergies:  Patient has no known allergies. Social History:   Social History     Tobacco Use   Smoking Status Every Day    Packs/day: 1.00    Types: Cigarettes    Start date: 2/9/1993   Smokeless Tobacco Current     Social History     Substance and Sexual Activity   Alcohol Use Never         Family History:   No family history on file. REVIEW OF SYSTEMS:  Constitutional: Negative. HENT: Negative. Eyes: Negative. Respiratory: Negative. Cardiovascular: Negative. Gastrointestinal: Negative. Genitourinary: Negative. Musculoskeletal:  Positive for joint pain, myalgias and neck pain. Skin: Negative. Endo/Heme/Allergies: Negative. Psychiatric/Behavioral: Negative. PHYSICAL EXAM:  Vitals:    02/27/23 1249   BP: 116/86   Pulse: 89   Resp: 18   SpO2: 97%     Constitutional: appears well-developed and well-nourished.    Eyes - conjunctiva normal.  Pupils react to light  Ear, nose, throat - hearing intact to finger rub, No scars, masses, or lesions over external nose or ears, no atrophy oftongue  Neck- symmetric, no masses noted, no jugular vein distension  Respiration- chest wall appears symmetric, good expansion, normal effort without use of accessory muscles  Musculoskeletal - no significant wasting of muscles noted, no bony deformities, gait no gross ataxia  Extremities- no clubbing, cyanosis oredema  Skin - warm, dry, and intact. No rash, erythema, or pallor. Psychiatric - mood, affect, and behavior appear normal.     Neurologic Examination  Awake, Alert and oriented x 4  Normal speech pattern, following commands    Motor:  RIGHT: hand grasp 5/5    finger extension 5/5    bicep 5/5    triceps 5/5    deltoid 5/5      LEFT:   hand grasp 5/5    finger extension 5/5    bicep 5/5    triceps 5/5    deltoid 5/5    Decrease to pinprick sensation LEFT entire hand except thumb, RIGHT hand patchy   Reflexes are 2+ and symmetric  No clonus or Hoffmans sign  No myofacial tenderness to palpation  Normal Gait pattern        DATA and IMAGING:    Nursing/pcp notes, imaging, labs, and vitals reviewed. PT,OT and/or speech notes reviewed    No results found for: WBC, HGB, HCT, MCV, PLTNo results found for: NA, K, CL, CO2, BUN, CREATININE, GLUCOSE, CALCIUM, PROT, LABALBU, BILITOT, ALKPHOS, AST, ALT, LABGLOM, GFRAA, AGRATIO, GLOBNo results found for: INR, PROTIME      NCS/EMG (7/18/2022) [facility]  Interpretation:  Mild RIGHT CTS    XR Cervical Spine (8/31/2022) [facility]   I have personally reviewed these images and my interpretation is:  DDD C4-C6, straightening of the cervical spine       ASSESSMENT:    Anthony Monson is a 43 y.o. male with DDD of the cervical spine, neck pain and BUE R>L. ICD-10-CM    1. DDD (degenerative disc disease), cervical  M50.30 External Referral To Physical Therapy      2. Spondylolisthesis of cervical region  M43.12 External Referral To Physical Therapy      3.  Neck pain M54.2 External Referral To Physical Therapy      4. Bilateral arm pain  M79.601 External Referral To Physical Therapy    M79.602       5. Numbness of right hand  R20.0 External Referral To Physical Therapy          PLAN:  I have discussed and reviewed the results of the XR cervical with Mr. Mary Carmona at length. I explained that he does have some degenerative changes and straightening of the cervical spine that can cause neck pain.   Given that he has not had PT in years and he has not tried many conservative treatments we will start with PT.    -Start methocarbamol   -Start PT (Serafni Fierro)   -Follow up after PT        CHARI Perez

## 2023-05-02 ENCOUNTER — OFFICE VISIT (OUTPATIENT)
Dept: NEUROSURGERY | Age: 43
End: 2023-05-02
Payer: COMMERCIAL

## 2023-05-02 VITALS
BODY MASS INDEX: 38.36 KG/M2 | DIASTOLIC BLOOD PRESSURE: 95 MMHG | WEIGHT: 315 LBS | HEART RATE: 80 BPM | SYSTOLIC BLOOD PRESSURE: 135 MMHG | HEIGHT: 76 IN

## 2023-05-02 DIAGNOSIS — M79.602 BILATERAL ARM PAIN: ICD-10-CM

## 2023-05-02 DIAGNOSIS — M79.601 BILATERAL ARM PAIN: ICD-10-CM

## 2023-05-02 DIAGNOSIS — M48.02 FORAMINAL STENOSIS OF CERVICAL REGION: Primary | ICD-10-CM

## 2023-05-02 DIAGNOSIS — M50.30 DDD (DEGENERATIVE DISC DISEASE), CERVICAL: ICD-10-CM

## 2023-05-02 DIAGNOSIS — R20.0 NUMBNESS OF RIGHT HAND: ICD-10-CM

## 2023-05-02 DIAGNOSIS — M54.2 NECK PAIN: ICD-10-CM

## 2023-05-02 PROCEDURE — 99215 OFFICE O/P EST HI 40 MIN: CPT | Performed by: NURSE PRACTITIONER

## 2023-05-02 RX ORDER — SODIUM CHLORIDE 9 MG/ML
INJECTION, SOLUTION INTRAVENOUS PRN
OUTPATIENT
Start: 2023-05-02

## 2023-05-02 RX ORDER — SODIUM CHLORIDE 0.9 % (FLUSH) 0.9 %
5-40 SYRINGE (ML) INJECTION EVERY 12 HOURS SCHEDULED
OUTPATIENT
Start: 2023-05-02

## 2023-05-02 RX ORDER — SODIUM CHLORIDE 0.9 % (FLUSH) 0.9 %
5-40 SYRINGE (ML) INJECTION PRN
OUTPATIENT
Start: 2023-05-02

## 2023-05-02 RX ORDER — SODIUM CHLORIDE 9 MG/ML
INJECTION, SOLUTION INTRAVENOUS CONTINUOUS
OUTPATIENT
Start: 2023-05-02

## 2023-05-02 RX ORDER — ACETAMINOPHEN 325 MG/1
1000 TABLET ORAL ONCE
OUTPATIENT
Start: 2023-05-02 | End: 2023-05-02

## 2023-05-02 ASSESSMENT — ENCOUNTER SYMPTOMS
RESPIRATORY NEGATIVE: 1
GASTROINTESTINAL NEGATIVE: 1
EYES NEGATIVE: 1

## 2023-05-02 NOTE — PROGRESS NOTES
[facility]  Office Visit      Chief Complaint   Patient presents with    Follow-up     Patient is here to follow up after imaging and states that his neck pain is the same as last visit. Results     Review MRI cervical completed 4/19/23 at [place]. Numbness     Patient states that he has BUE numbness and weakness. 5/02/2023: Mr. Cornelio Castro returns to clinic today to review the MRI cervical spine. He states he continues to have neck pain and BUE R>L that travels into the posterolateral aspect of the arms to the wrist and does not go into the hands. 4/10/2023: Mr. Cornelio Castro returns to clinic today to discuss his progress after PT. He states it has not helped his pain. He has actually been more sore at night. He continues to have neck pain that travels into the BUE R>L and into the triceps and forearms. Does have numbness and tingling in the first 3 digits. Does drop things often. HISTORY OF PRESENT ILLNESS:    Danyell Graves is a 43 y.o. male with a history of previous lumbar spine surgery per Dr. Qasim Artis 20+  years ago who presents today with neck pain that has been present for several years but worsening. The pain does radiate into the BUE R>L triceps, forearm, and into the hand. His pain is mostly located in the arms. The patient complains of numbness of the first 4 digits on the RIGHT. He does have numbness in the fingertips, trouble using hands to perform fine motor tasks or ataxia. Raising arms above head makes the pain worse. 70% arm pains and 30% neck pain. The patient has underwent a non-operative treatment course that has included:  NSAIDs (naproxen)  Muscle Relaxers (tizanidine)  Opiates (Norco 10 BID per Ted MARCELINO)  IM steroid  PT (not much help)    Of note he does use tobacco and does not take blood thinning medications. History reviewed. No pertinent past medical history.     Past Surgical History:   Procedure Laterality Date

## 2023-05-02 NOTE — H&P
and my interpretation is:  C3-4 moderate right foraminal stenosis  C4-5 moderate to severe right and mild to moderate left foraminal stenosis  C5-6 severe bilateral foraminal stenosis        ASSESSMENT:    Renaldo Holcomb is a 43 y.o. male with DDD of the cervical spine, neck pain and BUE R>L. ICD-10-CM    1. Foraminal stenosis of cervical region  M48.02 APTT     CBC     Comprehensive Metabolic Panel     EKG 12 Lead     Protime-INR     Type and Screen     Urinalysis with Reflex to Culture     Urine Drug Screen     XR CHEST (2 VW)      2. DDD (degenerative disc disease), cervical  M50.30       3. Neck pain  M54.2       4. Bilateral arm pain  M79.601     M79.602       5. Numbness of right hand  R20.0               PLAN:  -We have discussed and reviewed the results of the MRI cervical spine with . Alin Rodriguez at length. We explained that he does have several levels of significant foraminal stenosis that contribute to the arm pains. Given that he has attempted multiple non-operative treatments without any relief and the fact that his pain syndrome correlates with the MRI, we will offer him surgery. We explained that surgery would be to alleviate the arm pains/numbness and the neck pain may still remain. He would like to move forward with surgery. We explained that we would like for him to stop smoking since it can inhibit bony fusion and result in a pseudoarthrosis. He will need an ACDF C4-5, C5-6     We discussed risks, complications, and expectations, including but not limited to infection, paralysis, bowel and bladder dysfunction, possible need for revision procedure, persistent pain, spinal fluid leak, stroke and death. In addition, the benefits of the surgery were thoroughly discussed and the patient demonstrated a deep understanding. The patient wishes to proceed with surgical intervention. We will schedule for surgery in the near future.        CHARI Henriquez

## 2023-05-03 ENCOUNTER — TELEPHONE (OUTPATIENT)
Dept: NEUROSURGERY | Age: 43
End: 2023-05-03

## 2023-05-03 PROBLEM — M54.2 NECK PAIN: Status: ACTIVE | Noted: 2023-05-03

## 2023-05-03 NOTE — TELEPHONE ENCOUNTER
Patient is scheduled for surgery on 5/24/2023. I submitted a PA on 5/3/2023 and received notification of determination same day.     Status: APPROVED   Order ID: [id number]  Valid Dates: 5/24/2023-7/22/2023  Auth#: [id number]

## 2023-05-03 NOTE — TELEPHONE ENCOUNTER
Patient is scheduled for surgery on 5/24/2023. I submitted a PA on 5/3/2023 through [employer]    Status: IN PROGRESS  Order ID: [id number]  Anticipated Determination Date: 5/3/2023

## 2023-05-04 ENCOUNTER — APPOINTMENT (OUTPATIENT)
Dept: CT IMAGING | Facility: HOSPITAL | Age: 43
End: 2023-05-04
Payer: COMMERCIAL

## 2023-05-04 ENCOUNTER — HOSPITAL ENCOUNTER (EMERGENCY)
Facility: HOSPITAL | Age: 43
Discharge: HOME | End: 2023-05-05
Payer: COMMERCIAL

## 2023-05-04 ENCOUNTER — APPOINTMENT (OUTPATIENT)
Dept: GENERAL RADIOLOGY | Facility: HOSPITAL | Age: 43
End: 2023-05-04
Payer: COMMERCIAL

## 2023-05-04 VITALS
TEMPERATURE: 97.6 F | DIASTOLIC BLOOD PRESSURE: 93 MMHG | OXYGEN SATURATION: 94 % | WEIGHT: 315 LBS | BODY MASS INDEX: 38.36 KG/M2 | RESPIRATION RATE: 18 BRPM | HEART RATE: 73 BPM | SYSTOLIC BLOOD PRESSURE: 143 MMHG | HEIGHT: 76 IN

## 2023-05-04 DIAGNOSIS — R07.9 CHEST PAIN, UNSPECIFIED TYPE: ICD-10-CM

## 2023-05-04 DIAGNOSIS — Z53.21 ELOPED FROM EMERGENCY DEPARTMENT: Primary | ICD-10-CM

## 2023-05-04 LAB
ALBUMIN SERPL-MCNC: 4.4 G/DL (ref 3.5–5.2)
ALBUMIN/GLOB SERPL: 1.8 G/DL
ALP SERPL-CCNC: 59 U/L (ref 39–117)
ALT SERPL W P-5'-P-CCNC: 32 U/L (ref 1–41)
ANION GAP SERPL CALCULATED.3IONS-SCNC: 12 MMOL/L (ref 5–15)
ANISOCYTOSIS BLD QL: ABNORMAL
APTT PPP: 32.8 SECONDS (ref 24.1–35)
AST SERPL-CCNC: 27 U/L (ref 1–40)
BILIRUB SERPL-MCNC: 0.2 MG/DL (ref 0–1.2)
BUN SERPL-MCNC: 16 MG/DL (ref 6–20)
BUN/CREAT SERPL: 18.6 (ref 7–25)
CALCIUM SPEC-SCNC: 9 MG/DL (ref 8.6–10.5)
CHLORIDE SERPL-SCNC: 103 MMOL/L (ref 98–107)
CO2 SERPL-SCNC: 23 MMOL/L (ref 22–29)
CREAT SERPL-MCNC: 0.86 MG/DL (ref 0.76–1.27)
DACRYOCYTES BLD QL SMEAR: ABNORMAL
DEPRECATED RDW RBC AUTO: 43.6 FL (ref 37–54)
EGFRCR SERPLBLD CKD-EPI 2021: 110.9 ML/MIN/1.73
EOSINOPHIL # BLD MANUAL: 0.34 10*3/MM3 (ref 0–0.4)
EOSINOPHIL NFR BLD MANUAL: 3.3 % (ref 0.3–6.2)
ERYTHROCYTE [DISTWIDTH] IN BLOOD BY AUTOMATED COUNT: 13.6 % (ref 12.3–15.4)
ETHANOL UR QL: <0.01 %
GEN 5 2HR TROPONIN T REFLEX: <6 NG/L
GLOBULIN UR ELPH-MCNC: 2.4 GM/DL
GLUCOSE SERPL-MCNC: 169 MG/DL (ref 65–99)
HCT VFR BLD AUTO: 47.6 % (ref 37.5–51)
HGB BLD-MCNC: 15.9 G/DL (ref 13–17.7)
HOLD SPECIMEN: NORMAL
HOLD SPECIMEN: NORMAL
INR PPP: 0.9 (ref 0.91–1.09)
LIPASE SERPL-CCNC: 23 U/L (ref 13–60)
LYMPHOCYTES # BLD MANUAL: 3.25 10*3/MM3 (ref 0.7–3.1)
LYMPHOCYTES NFR BLD MANUAL: 7.6 % (ref 5–12)
MAGNESIUM SERPL-MCNC: 2.1 MG/DL (ref 1.6–2.6)
MCH RBC QN AUTO: 29 PG (ref 26.6–33)
MCHC RBC AUTO-ENTMCNC: 33.4 G/DL (ref 31.5–35.7)
MCV RBC AUTO: 86.9 FL (ref 79–97)
METAMYELOCYTES NFR BLD MANUAL: 1.1 % (ref 0–0)
MONOCYTES # BLD: 0.78 10*3/MM3 (ref 0.1–0.9)
NEUTROPHILS # BLD AUTO: 5.82 10*3/MM3 (ref 1.7–7)
NEUTROPHILS NFR BLD MANUAL: 54.3 % (ref 42.7–76)
NEUTS BAND NFR BLD MANUAL: 2.2 % (ref 0–5)
NRBC BLD AUTO-RTO: 0 /100 WBC (ref 0–0.2)
PLAT MORPH BLD: NORMAL
PLATELET # BLD AUTO: 174 10*3/MM3 (ref 140–450)
PMV BLD AUTO: 13 FL (ref 6–12)
POIKILOCYTOSIS BLD QL SMEAR: ABNORMAL
POLYCHROMASIA BLD QL SMEAR: ABNORMAL
POTASSIUM SERPL-SCNC: 4.6 MMOL/L (ref 3.5–5.2)
PROCALCITONIN SERPL-MCNC: 0.05 NG/ML (ref 0–0.25)
PROT SERPL-MCNC: 6.8 G/DL (ref 6–8.5)
PROTHROMBIN TIME: 12.3 SECONDS (ref 11.8–14.8)
RBC # BLD AUTO: 5.48 10*6/MM3 (ref 4.14–5.8)
SODIUM SERPL-SCNC: 138 MMOL/L (ref 136–145)
SPHEROCYTES BLD QL SMEAR: ABNORMAL
T4 FREE SERPL-MCNC: 1.27 NG/DL (ref 0.93–1.7)
TROPONIN T DELTA: NORMAL
TROPONIN T SERPL HS-MCNC: <6 NG/L
TSH SERPL DL<=0.05 MIU/L-ACNC: 1.73 UIU/ML (ref 0.27–4.2)
VARIANT LYMPHS NFR BLD MANUAL: 28.3 % (ref 19.6–45.3)
VARIANT LYMPHS NFR BLD MANUAL: 3.3 % (ref 0–5)
WBC MORPH BLD: NORMAL
WBC NRBC COR # BLD: 10.3 10*3/MM3 (ref 3.4–10.8)
WHOLE BLOOD HOLD COAG: NORMAL
WHOLE BLOOD HOLD SPECIMEN: NORMAL

## 2023-05-04 PROCEDURE — 25010000002 ONDANSETRON PER 1 MG: Performed by: PHYSICIAN ASSISTANT

## 2023-05-04 PROCEDURE — 80307 DRUG TEST PRSMV CHEM ANLYZR: CPT | Performed by: PHYSICIAN ASSISTANT

## 2023-05-04 PROCEDURE — 36415 COLL VENOUS BLD VENIPUNCTURE: CPT

## 2023-05-04 PROCEDURE — 96375 TX/PRO/DX INJ NEW DRUG ADDON: CPT

## 2023-05-04 PROCEDURE — 84484 ASSAY OF TROPONIN QUANT: CPT

## 2023-05-04 PROCEDURE — 87637 SARSCOV2&INF A&B&RSV AMP PRB: CPT | Performed by: PHYSICIAN ASSISTANT

## 2023-05-04 PROCEDURE — 84439 ASSAY OF FREE THYROXINE: CPT | Performed by: PHYSICIAN ASSISTANT

## 2023-05-04 PROCEDURE — 85007 BL SMEAR W/DIFF WBC COUNT: CPT

## 2023-05-04 PROCEDURE — 83735 ASSAY OF MAGNESIUM: CPT | Performed by: PHYSICIAN ASSISTANT

## 2023-05-04 PROCEDURE — 85610 PROTHROMBIN TIME: CPT | Performed by: PHYSICIAN ASSISTANT

## 2023-05-04 PROCEDURE — 80050 GENERAL HEALTH PANEL: CPT

## 2023-05-04 PROCEDURE — 99283 EMERGENCY DEPT VISIT LOW MDM: CPT

## 2023-05-04 PROCEDURE — 71045 X-RAY EXAM CHEST 1 VIEW: CPT

## 2023-05-04 PROCEDURE — 85730 THROMBOPLASTIN TIME PARTIAL: CPT | Performed by: PHYSICIAN ASSISTANT

## 2023-05-04 PROCEDURE — 83690 ASSAY OF LIPASE: CPT | Performed by: PHYSICIAN ASSISTANT

## 2023-05-04 PROCEDURE — 71275 CT ANGIOGRAPHY CHEST: CPT

## 2023-05-04 PROCEDURE — 96374 THER/PROPH/DIAG INJ IV PUSH: CPT

## 2023-05-04 PROCEDURE — 82077 ASSAY SPEC XCP UR&BREATH IA: CPT | Performed by: PHYSICIAN ASSISTANT

## 2023-05-04 PROCEDURE — 25010000002 MORPHINE PER 10 MG: Performed by: STUDENT IN AN ORGANIZED HEALTH CARE EDUCATION/TRAINING PROGRAM

## 2023-05-04 PROCEDURE — 25510000001 IOPAMIDOL PER 1 ML: Performed by: PHYSICIAN ASSISTANT

## 2023-05-04 PROCEDURE — 84145 PROCALCITONIN (PCT): CPT | Performed by: PHYSICIAN ASSISTANT

## 2023-05-04 PROCEDURE — 93005 ELECTROCARDIOGRAM TRACING: CPT

## 2023-05-04 RX ORDER — SODIUM CHLORIDE 0.9 % (FLUSH) 0.9 %
10 SYRINGE (ML) INJECTION AS NEEDED
Status: DISCONTINUED | OUTPATIENT
Start: 2023-05-04 | End: 2023-05-05 | Stop reason: HOSPADM

## 2023-05-04 RX ORDER — ASPIRIN 81 MG/1
324 TABLET, CHEWABLE ORAL ONCE
Status: COMPLETED | OUTPATIENT
Start: 2023-05-04 | End: 2023-05-04

## 2023-05-04 RX ORDER — ONDANSETRON 2 MG/ML
4 INJECTION INTRAMUSCULAR; INTRAVENOUS ONCE
Status: COMPLETED | OUTPATIENT
Start: 2023-05-04 | End: 2023-05-04

## 2023-05-04 RX ADMIN — MORPHINE SULFATE 4 MG: 4 INJECTION, SOLUTION INTRAMUSCULAR; INTRAVENOUS at 23:51

## 2023-05-04 RX ADMIN — SODIUM CHLORIDE 1000 ML: 9 INJECTION, SOLUTION INTRAVENOUS at 23:49

## 2023-05-04 RX ADMIN — ONDANSETRON 4 MG: 2 INJECTION INTRAMUSCULAR; INTRAVENOUS at 23:50

## 2023-05-04 RX ADMIN — IOPAMIDOL 100 ML: 755 INJECTION, SOLUTION INTRAVENOUS at 23:23

## 2023-05-04 RX ADMIN — ASPIRIN 324 MG: 81 TABLET, CHEWABLE ORAL at 22:38

## 2023-05-05 LAB
AMPHET+METHAMPHET UR QL: NEGATIVE
AMPHETAMINES UR QL: NEGATIVE
BARBITURATES UR QL SCN: NEGATIVE
BENZODIAZ UR QL SCN: NEGATIVE
BUPRENORPHINE SERPL-MCNC: NEGATIVE NG/ML
CANNABINOIDS SERPL QL: NEGATIVE
COCAINE UR QL: NEGATIVE
D-LACTATE SERPL-SCNC: 1.1 MMOL/L (ref 0.5–2)
FENTANYL UR-MCNC: NEGATIVE NG/ML
FLUAV RNA RESP QL NAA+PROBE: NOT DETECTED
FLUBV RNA RESP QL NAA+PROBE: NOT DETECTED
METHADONE UR QL SCN: NEGATIVE
OPIATES UR QL: POSITIVE
OXYCODONE UR QL SCN: NEGATIVE
PCP UR QL SCN: NEGATIVE
PROPOXYPH UR QL: NEGATIVE
RSV RNA NPH QL NAA+NON-PROBE: NOT DETECTED
SARS-COV-2 RNA RESP QL NAA+PROBE: NOT DETECTED
TRICYCLICS UR QL SCN: NEGATIVE

## 2023-05-05 PROCEDURE — 83605 ASSAY OF LACTIC ACID: CPT | Performed by: PHYSICIAN ASSISTANT

## 2023-05-05 NOTE — ED PROVIDER NOTES
Subjective   History of Present Illness    Patient is a 42-year-old male presenting to ED with chest pain.  PMH significant for hypertension, GERD, chronic neck and back pain.  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  Patient states throughout the day the pain has continued to stay localized to that area but is now a sharp pain.  Patient denies any radiation into his back, the remainder of his chest, neck, arms, abdomen.  Patient reports some mild diaphoresis but denies any associated nausea.  Patient states that he has developed shortness of breath throughout the day both with rest and exertion.  Patient did state that for the past few weeks this pain will come on and off every 3 to 4 days however it is generally mild, never become sharp, and always self resolves stating that today is the longest it has lasted.  Patient has never used any medicines including no aspirin or nitroglycerin today.  Patient denies any recent illnesses, injuries, or history of similar symptoms.  Patient did state for an unknown reason he had an echocardiogram over 10 years ago which was normal and has had no cardiology follow-up since.  Patient is scheduled to have an ACDF procedure by Dr. Amaral on 5/24/2023 due to chronic neck pain but otherwise denies any illnesses or upcoming procedures.  Patient reports use of 1 pack/day of cigarettes but hormone products, or any family history of sudden cardiac disease under age 60.    Records reviewed show patient was last seen in the ED on 12/4/2022 for sciatica.    Patient last seen in the outpatient setting by neurosurgery on 5/2/2023 for foraminal stenosis of cervical region, degenerative disc disease, neck pain, bilateral arm pain, numbness of right hand.  Patient is scheduled for an ACDF of C4-5, C5-6 on 5/24/2023.    Review of Systems   Constitutional: Positive for  diaphoresis. Negative for fever.   HENT: Negative.    Eyes: Negative.    Respiratory: Positive for shortness of breath. Negative for cough and chest tightness.    Cardiovascular: Positive for chest pain (Additionally central pressure, no central stabbing). Negative for palpitations and leg swelling.   Gastrointestinal: Negative.  Negative for nausea and vomiting.   Genitourinary: Negative.    Musculoskeletal: Negative.  Negative for back pain and neck pain.   Skin: Negative.    Allergic/Immunologic: Negative for immunocompromised state.   Neurological: Negative.  Negative for dizziness, weakness, light-headedness and numbness.   Psychiatric/Behavioral: Negative.    All other systems reviewed and are negative.      Past Medical History:   Diagnosis Date   • GERD (gastroesophageal reflux disease)    • Hypertension    • Low back pain        No Known Allergies    Past Surgical History:   Procedure Laterality Date   • BACK SURGERY         Family History   Problem Relation Age of Onset   • Cancer Mother    • Diabetes Father    • Hypertension Father        Social History     Socioeconomic History   • Marital status: Single   Tobacco Use   • Smoking status: Every Day     Packs/day: 1.00     Types: Cigarettes   • Smokeless tobacco: Never   Substance and Sexual Activity   • Drug use: Never   • Sexual activity: Yes     Partners: Female           Objective   Physical Exam  Vitals and nursing note reviewed.   Constitutional:       General: He is not in acute distress.     Appearance: Normal appearance. He is well-developed and well-groomed. He is obese. He is not ill-appearing, toxic-appearing or diaphoretic.   HENT:      Head: Normocephalic.      Mouth/Throat:      Mouth: Mucous membranes are moist.      Pharynx: Oropharynx is clear.   Eyes:      Conjunctiva/sclera: Conjunctivae normal.      Pupils: Pupils are equal, round, and reactive to light.   Cardiovascular:      Rate and Rhythm: Normal rate and regular rhythm.    Pulmonary:      Effort: Pulmonary effort is normal. No respiratory distress.      Breath sounds: Normal breath sounds. No wheezing.   Chest:      Chest wall: No tenderness.   Abdominal:      General: Bowel sounds are normal.      Palpations: Abdomen is soft.      Tenderness: There is no abdominal tenderness.   Musculoskeletal:         General: Normal range of motion.      Cervical back: Neck supple.      Right lower leg: No edema.      Left lower leg: No edema.   Skin:     General: Skin is warm and dry.   Neurological:      Mental Status: He is alert and oriented to person, place, and time.      Gait: Gait normal.   Psychiatric:         Mood and Affect: Mood normal.         Speech: Speech normal.         Behavior: Behavior normal. Behavior is cooperative.         Procedures           ED Course    HEART SCORE: 1 (risk factors - tobacco, obesity)    PERC SCORE: 1 (O2 94%; cannot be used to rule out PE)    WELLS CRITERIA: 0 (low risk, 1.3% chance PE in ED population)      ED Course as of 05/05/23 0313   Fri May 05, 2023   0246 Per RN patient eloped at this time.   Discharge discussion was not reviewed and patient was not made aware of lab and imaging findings or recommendations for outpatient follow-up. [JS]      ED Course User Index  [JS] Alberto Dalal PA-C                                           Medical Decision Making  Chest pain, unspecified type: acute illness or injury  Eloped from emergency department: acute illness or injury  Amount and/or Complexity of Data Reviewed  External Data Reviewed: labs, radiology, ECG and notes.  Labs: ordered. Decision-making details documented in ED Course.  Radiology: ordered. Decision-making details documented in ED Course.  ECG/medicine tests: ordered. Decision-making details documented in ED Course.  Discussion of management or test interpretation with external provider(s): Dr. Tyler Conley (attending)    Risk  Prescription drug management.            Final diagnoses:    Chest pain, unspecified type   Eloped from emergency department       ED Disposition  ED Disposition     ED Disposition   Eloped    Condition   --    Comment   Pt is observed by houekeeping leaving              No follow-up provider specified.       Medication List      No changes were made to your prescriptions during this visit.          Alberto Dalal PA-C  05/05/23 0313

## 2023-05-07 LAB
QT INTERVAL: 344 MS
QT INTERVAL: 372 MS
QTC INTERVAL: 411 MS
QTC INTERVAL: 412 MS

## 2023-05-18 ENCOUNTER — HOSPITAL ENCOUNTER (OUTPATIENT)
Dept: GENERAL RADIOLOGY | Age: 43
Discharge: HOME | End: 2023-05-18
Payer: COMMERCIAL

## 2023-05-18 ENCOUNTER — HOSPITAL ENCOUNTER (OUTPATIENT)
Dept: PREADMISSION TESTING | Age: 43
Discharge: HOME | End: 2023-05-22
Payer: COMMERCIAL

## 2023-05-18 VITALS — WEIGHT: 315 LBS | HEIGHT: 76 IN | BODY MASS INDEX: 38.36 KG/M2

## 2023-05-18 DIAGNOSIS — M54.2 NECK PAIN: ICD-10-CM

## 2023-05-18 DIAGNOSIS — M48.02 FORAMINAL STENOSIS OF CERVICAL REGION: ICD-10-CM

## 2023-05-18 DIAGNOSIS — M43.12 SPONDYLOLISTHESIS OF CERVICAL REGION: ICD-10-CM

## 2023-05-18 DIAGNOSIS — M79.602 BILATERAL ARM PAIN: ICD-10-CM

## 2023-05-18 DIAGNOSIS — M79.601 BILATERAL ARM PAIN: ICD-10-CM

## 2023-05-18 DIAGNOSIS — M50.30 DDD (DEGENERATIVE DISC DISEASE), CERVICAL: ICD-10-CM

## 2023-05-18 DIAGNOSIS — R20.0 NUMBNESS OF RIGHT HAND: ICD-10-CM

## 2023-05-18 LAB
ABO + RH BLD: NORMAL
ALBUMIN SERPL-MCNC: 4.3 G/DL (ref 3.5–5.2)
ALP SERPL-CCNC: 58 U/L (ref 40–130)
ALT SERPL-CCNC: 32 U/L (ref 5–41)
AMPHET UR QL SCN: NEGATIVE
ANION GAP SERPL CALCULATED.3IONS-SCNC: 13 MMOL/L (ref 7–19)
APTT PPP: 32.7 SEC (ref 26–36.2)
AST SERPL-CCNC: 26 U/L (ref 5–40)
BARBITURATES UR QL SCN: NEGATIVE
BENZODIAZ UR QL SCN: NEGATIVE
BILIRUB SERPL-MCNC: 0.5 MG/DL (ref 0.2–1.2)
BILIRUB UR QL STRIP: NEGATIVE
BLD GP AB SCN SERPL QL: NORMAL
BUN SERPL-MCNC: 17 MG/DL (ref 6–20)
BUPRENORPHINE URINE: NEGATIVE
CALCIUM SERPL-MCNC: 9.3 MG/DL (ref 8.6–10)
CANNABINOIDS UR QL SCN: NEGATIVE
CHLORIDE SERPL-SCNC: 104 MMOL/L (ref 98–111)
CLARITY UR: CLEAR
CO2 SERPL-SCNC: 23 MMOL/L (ref 22–29)
COCAINE UR QL SCN: NEGATIVE
COLOR UR: ABNORMAL
CREAT SERPL-MCNC: 1 MG/DL (ref 0.5–1.2)
DRUG SCREEN COMMENT UR-IMP: NORMAL
EKG P AXIS: 26 DEGREES
EKG P-R INTERVAL: 166 MS
EKG Q-T INTERVAL: 350 MS
EKG QRS DURATION: 78 MS
EKG QTC CALCULATION (BAZETT): 385 MS
EKG T AXIS: 29 DEGREES
ERYTHROCYTE [DISTWIDTH] IN BLOOD BY AUTOMATED COUNT: 14 % (ref 11.5–14.5)
GLUCOSE SERPL-MCNC: 247 MG/DL (ref 74–109)
GLUCOSE UR STRIP.AUTO-MCNC: =>1000 MG/DL
HCT VFR BLD AUTO: 49.7 % (ref 42–52)
HGB BLD-MCNC: 16.7 G/DL (ref 14–18)
HGB UR STRIP.AUTO-MCNC: NEGATIVE MG/L
INR PPP: 1.08 (ref 0.88–1.18)
KETONES UR STRIP.AUTO-MCNC: ABNORMAL MG/DL
LEUKOCYTE ESTERASE UR QL STRIP.AUTO: NEGATIVE
MCH RBC QN AUTO: 29.5 PG (ref 27–31)
MCHC RBC AUTO-ENTMCNC: 33.6 G/DL (ref 33–37)
MCV RBC AUTO: 87.7 FL (ref 80–94)
METHADONE UR QL SCN: NEGATIVE
METHAMPHETAMINE, URINE: NEGATIVE
NITRITE UR QL STRIP.AUTO: NEGATIVE
OPIATES UR QL SCN: NEGATIVE
OXYCODONE UR QL SCN: NEGATIVE
PCP UR QL SCN: NEGATIVE
PH UR STRIP.AUTO: 5 [PH] (ref 5–8)
PLATELET # BLD AUTO: 157 K/UL (ref 130–400)
PMV BLD AUTO: 13.6 FL (ref 9.4–12.4)
POTASSIUM SERPL-SCNC: 4.5 MMOL/L (ref 3.5–5)
PROPOXYPH UR QL SCN: NEGATIVE
PROT SERPL-MCNC: 7.3 G/DL (ref 6.6–8.7)
PROT UR STRIP.AUTO-MCNC: NEGATIVE MG/DL
PROTHROMBIN TIME: 13.6 SEC (ref 12–14.6)
RBC # BLD AUTO: 5.67 M/UL (ref 4.7–6.1)
SODIUM SERPL-SCNC: 140 MMOL/L (ref 136–145)
SP GR UR STRIP.AUTO: 1.04 (ref 1–1.03)
TRICYCLIC, URINE: NEGATIVE
UROBILINOGEN UR STRIP.AUTO-MCNC: 0.2 E.U./DL
WBC # BLD AUTO: 11.7 K/UL (ref 4.8–10.8)

## 2023-05-18 PROCEDURE — 85610 PROTHROMBIN TIME: CPT

## 2023-05-18 PROCEDURE — 81003 URINALYSIS AUTO W/O SCOPE: CPT

## 2023-05-18 PROCEDURE — 86901 BLOOD TYPING SEROLOGIC RH(D): CPT

## 2023-05-18 PROCEDURE — 71046 X-RAY EXAM CHEST 2 VIEWS: CPT

## 2023-05-18 PROCEDURE — 86850 RBC ANTIBODY SCREEN: CPT

## 2023-05-18 PROCEDURE — 93010 ELECTROCARDIOGRAM REPORT: CPT | Performed by: INTERNAL MEDICINE

## 2023-05-18 PROCEDURE — 85027 COMPLETE CBC AUTOMATED: CPT

## 2023-05-18 PROCEDURE — 93005 ELECTROCARDIOGRAM TRACING: CPT

## 2023-05-18 PROCEDURE — 80053 COMPREHEN METABOLIC PANEL: CPT

## 2023-05-18 PROCEDURE — 80306 DRUG TEST PRSMV INSTRMNT: CPT

## 2023-05-18 PROCEDURE — 86900 BLOOD TYPING SEROLOGIC ABO: CPT

## 2023-05-18 PROCEDURE — 85730 THROMBOPLASTIN TIME PARTIAL: CPT

## 2023-05-24 ENCOUNTER — ANESTHESIA (OUTPATIENT)
Dept: OPERATING ROOM | Age: 43
End: 2023-05-24
Payer: COMMERCIAL

## 2023-05-24 ENCOUNTER — ANESTHESIA EVENT (OUTPATIENT)
Dept: OPERATING ROOM | Age: 43
End: 2023-05-24
Payer: COMMERCIAL

## 2023-05-24 ENCOUNTER — APPOINTMENT (OUTPATIENT)
Dept: GENERAL RADIOLOGY | Age: 43
End: 2023-05-24
Attending: NEUROLOGICAL SURGERY
Payer: COMMERCIAL

## 2023-05-24 ENCOUNTER — HOSPITAL ENCOUNTER (OUTPATIENT)
Age: 43
Setting detail: OUTPATIENT SURGERY
Discharge: HOME | End: 2023-05-24
Attending: NEUROLOGICAL SURGERY | Admitting: NEUROLOGICAL SURGERY
Payer: COMMERCIAL

## 2023-05-24 VITALS
DIASTOLIC BLOOD PRESSURE: 94 MMHG | BODY MASS INDEX: 38.36 KG/M2 | HEIGHT: 76 IN | OXYGEN SATURATION: 95 % | RESPIRATION RATE: 16 BRPM | WEIGHT: 315 LBS | HEART RATE: 77 BPM | TEMPERATURE: 97.1 F | SYSTOLIC BLOOD PRESSURE: 144 MMHG

## 2023-05-24 DIAGNOSIS — M54.2 NECK PAIN: ICD-10-CM

## 2023-05-24 DIAGNOSIS — G89.18 POST-OPERATIVE PAIN: Primary | ICD-10-CM

## 2023-05-24 PROBLEM — M47.22 CERVICAL SPONDYLOSIS WITH RADICULOPATHY: Status: ACTIVE | Noted: 2023-05-24

## 2023-05-24 LAB
ABO + RH BLD: NORMAL
BLD GP AB SCN SERPL QL: NORMAL

## 2023-05-24 PROCEDURE — A4216 STERILE WATER/SALINE, 10 ML: HCPCS | Performed by: ANESTHESIOLOGY

## 2023-05-24 PROCEDURE — 6370000000 HC RX 637 (ALT 250 FOR IP): Performed by: NEUROLOGICAL SURGERY

## 2023-05-24 PROCEDURE — 2580000003 HC RX 258: Performed by: NURSE PRACTITIONER

## 2023-05-24 PROCEDURE — 22845 INSERT SPINE FIXATION DEVICE: CPT | Performed by: NEUROLOGICAL SURGERY

## 2023-05-24 PROCEDURE — 22853 INSJ BIOMECHANICAL DEVICE: CPT | Performed by: NEUROLOGICAL SURGERY

## 2023-05-24 PROCEDURE — 94660 CPAP INITIATION&MGMT: CPT

## 2023-05-24 PROCEDURE — 3700000001 HC ADD 15 MINUTES (ANESTHESIA): Performed by: NEUROLOGICAL SURGERY

## 2023-05-24 PROCEDURE — 6370000000 HC RX 637 (ALT 250 FOR IP): Performed by: NURSE ANESTHETIST, CERTIFIED REGISTERED

## 2023-05-24 PROCEDURE — 86850 RBC ANTIBODY SCREEN: CPT

## 2023-05-24 PROCEDURE — 3600000015 HC SURGERY LEVEL 5 ADDTL 15MIN: Performed by: NEUROLOGICAL SURGERY

## 2023-05-24 PROCEDURE — 3700000000 HC ANESTHESIA ATTENDED CARE: Performed by: NEUROLOGICAL SURGERY

## 2023-05-24 PROCEDURE — 94760 N-INVAS EAR/PLS OXIMETRY 1: CPT

## 2023-05-24 PROCEDURE — 86900 BLOOD TYPING SEROLOGIC ABO: CPT

## 2023-05-24 PROCEDURE — 2580000003 HC RX 258: Performed by: ANESTHESIOLOGY

## 2023-05-24 PROCEDURE — C1713 ANCHOR/SCREW BN/BN,TIS/BN: HCPCS | Performed by: NEUROLOGICAL SURGERY

## 2023-05-24 PROCEDURE — 36415 COLL VENOUS BLD VENIPUNCTURE: CPT

## 2023-05-24 PROCEDURE — 2580000003 HC RX 258: Performed by: NEUROLOGICAL SURGERY

## 2023-05-24 PROCEDURE — 72040 X-RAY EXAM NECK SPINE 2-3 VW: CPT

## 2023-05-24 PROCEDURE — 3600000005 HC SURGERY LEVEL 5 BASE: Performed by: NEUROLOGICAL SURGERY

## 2023-05-24 PROCEDURE — 6360000002 HC RX W HCPCS: Performed by: NEUROLOGICAL SURGERY

## 2023-05-24 PROCEDURE — 7100000011 HC PHASE II RECOVERY - ADDTL 15 MIN: Performed by: NEUROLOGICAL SURGERY

## 2023-05-24 PROCEDURE — 7100000000 HC PACU RECOVERY - FIRST 15 MIN: Performed by: NEUROLOGICAL SURGERY

## 2023-05-24 PROCEDURE — 2700000000 HC OXYGEN THERAPY PER DAY

## 2023-05-24 PROCEDURE — 6360000002 HC RX W HCPCS: Performed by: NURSE ANESTHETIST, CERTIFIED REGISTERED

## 2023-05-24 PROCEDURE — 2500000003 HC RX 250 WO HCPCS: Performed by: ANESTHESIOLOGY

## 2023-05-24 PROCEDURE — A4217 STERILE WATER/SALINE, 500 ML: HCPCS | Performed by: NEUROLOGICAL SURGERY

## 2023-05-24 PROCEDURE — 2720000010 HC SURG SUPPLY STERILE: Performed by: NEUROLOGICAL SURGERY

## 2023-05-24 PROCEDURE — 2500000003 HC RX 250 WO HCPCS: Performed by: NEUROLOGICAL SURGERY

## 2023-05-24 PROCEDURE — 2500000003 HC RX 250 WO HCPCS: Performed by: NURSE ANESTHETIST, CERTIFIED REGISTERED

## 2023-05-24 PROCEDURE — 2709999900 HC NON-CHARGEABLE SUPPLY: Performed by: NEUROLOGICAL SURGERY

## 2023-05-24 PROCEDURE — 6360000002 HC RX W HCPCS: Performed by: NURSE PRACTITIONER

## 2023-05-24 PROCEDURE — 7100000001 HC PACU RECOVERY - ADDTL 15 MIN: Performed by: NEUROLOGICAL SURGERY

## 2023-05-24 PROCEDURE — 7100000010 HC PHASE II RECOVERY - FIRST 15 MIN: Performed by: NEUROLOGICAL SURGERY

## 2023-05-24 PROCEDURE — 22552 ARTHRD ANT NTRBD CERVICAL EA: CPT | Performed by: NEUROLOGICAL SURGERY

## 2023-05-24 PROCEDURE — L0120 CERV FLEX N/ADJ FOAM PRE OTS: HCPCS | Performed by: NEUROLOGICAL SURGERY

## 2023-05-24 PROCEDURE — 86901 BLOOD TYPING SEROLOGIC RH(D): CPT

## 2023-05-24 PROCEDURE — 6360000002 HC RX W HCPCS: Performed by: ANESTHESIOLOGY

## 2023-05-24 PROCEDURE — 22551 ARTHRD ANT NTRBDY CERVICAL: CPT | Performed by: NEUROLOGICAL SURGERY

## 2023-05-24 DEVICE — SCREW 7713517 ZEVO VAR SD 3.5MM X 17MM
Type: IMPLANTABLE DEVICE | Site: SPINE CERVICAL | Status: FUNCTIONAL
Brand: ZEVO™ ANTERIOR CERVICAL PLATE SYSTEM

## 2023-05-24 DEVICE — GRAFT HUM TISS W14XH6XL16MM ALLGRFT ANAT CORNERSTONE: Type: IMPLANTABLE DEVICE | Site: SPINE CERVICAL | Status: FUNCTIONAL

## 2023-05-24 RX ORDER — FENTANYL CITRATE 50 UG/ML
INJECTION, SOLUTION INTRAMUSCULAR; INTRAVENOUS PRN
Status: DISCONTINUED | OUTPATIENT
Start: 2023-05-24 | End: 2023-05-24 | Stop reason: SDUPTHER

## 2023-05-24 RX ORDER — PROPOFOL 10 MG/ML
INJECTION, EMULSION INTRAVENOUS PRN
Status: DISCONTINUED | OUTPATIENT
Start: 2023-05-24 | End: 2023-05-24 | Stop reason: SDUPTHER

## 2023-05-24 RX ORDER — DEXAMETHASONE SODIUM PHOSPHATE 10 MG/ML
8 INJECTION, SOLUTION INTRAMUSCULAR; INTRAVENOUS ONCE
Status: COMPLETED | OUTPATIENT
Start: 2023-05-24 | End: 2023-05-24

## 2023-05-24 RX ORDER — ROCURONIUM BROMIDE 10 MG/ML
INJECTION, SOLUTION INTRAVENOUS PRN
Status: DISCONTINUED | OUTPATIENT
Start: 2023-05-24 | End: 2023-05-24 | Stop reason: SDUPTHER

## 2023-05-24 RX ORDER — SODIUM CHLORIDE 0.9 % (FLUSH) 0.9 %
5-40 SYRINGE (ML) INJECTION EVERY 12 HOURS SCHEDULED
Status: DISCONTINUED | OUTPATIENT
Start: 2023-05-24 | End: 2023-05-24 | Stop reason: HOSPADM

## 2023-05-24 RX ORDER — DEXMEDETOMIDINE HYDROCHLORIDE 100 UG/ML
INJECTION, SOLUTION INTRAVENOUS PRN
Status: DISCONTINUED | OUTPATIENT
Start: 2023-05-24 | End: 2023-05-24 | Stop reason: SDUPTHER

## 2023-05-24 RX ORDER — SODIUM CHLORIDE 9 MG/ML
INJECTION, SOLUTION INTRAVENOUS PRN
Status: DISCONTINUED | OUTPATIENT
Start: 2023-05-24 | End: 2023-05-24 | Stop reason: HOSPADM

## 2023-05-24 RX ORDER — HYDROCODONE BITARTRATE AND ACETAMINOPHEN 10; 325 MG/1; MG/1
1 TABLET ORAL EVERY 8 HOURS PRN
Qty: 42 TABLET | Refills: 0 | Status: SHIPPED | OUTPATIENT
Start: 2023-05-24 | End: 2023-06-07

## 2023-05-24 RX ORDER — ACETAMINOPHEN 500 MG
1000 TABLET ORAL ONCE
Status: DISCONTINUED | OUTPATIENT
Start: 2023-05-24 | End: 2023-05-24 | Stop reason: HOSPADM

## 2023-05-24 RX ORDER — SODIUM CHLORIDE, SODIUM LACTATE, POTASSIUM CHLORIDE, CALCIUM CHLORIDE 600; 310; 30; 20 MG/100ML; MG/100ML; MG/100ML; MG/100ML
INJECTION, SOLUTION INTRAVENOUS CONTINUOUS
Status: DISCONTINUED | OUTPATIENT
Start: 2023-05-24 | End: 2023-05-24 | Stop reason: HOSPADM

## 2023-05-24 RX ORDER — SODIUM CHLORIDE 9 MG/ML
INJECTION, SOLUTION INTRAVENOUS CONTINUOUS
Status: DISCONTINUED | OUTPATIENT
Start: 2023-05-24 | End: 2023-05-24 | Stop reason: HOSPADM

## 2023-05-24 RX ORDER — MIDAZOLAM HYDROCHLORIDE 1 MG/ML
INJECTION INTRAMUSCULAR; INTRAVENOUS PRN
Status: DISCONTINUED | OUTPATIENT
Start: 2023-05-24 | End: 2023-05-24 | Stop reason: SDUPTHER

## 2023-05-24 RX ORDER — HYDROMORPHONE HYDROCHLORIDE 1 MG/ML
0.5 INJECTION, SOLUTION INTRAMUSCULAR; INTRAVENOUS; SUBCUTANEOUS EVERY 5 MIN PRN
Status: DISCONTINUED | OUTPATIENT
Start: 2023-05-24 | End: 2023-05-24 | Stop reason: HOSPADM

## 2023-05-24 RX ORDER — ONDANSETRON 2 MG/ML
4 INJECTION INTRAMUSCULAR; INTRAVENOUS
Status: DISCONTINUED | OUTPATIENT
Start: 2023-05-24 | End: 2023-05-24 | Stop reason: HOSPADM

## 2023-05-24 RX ORDER — SODIUM CHLORIDE 0.9 % (FLUSH) 0.9 %
5-40 SYRINGE (ML) INJECTION PRN
Status: DISCONTINUED | OUTPATIENT
Start: 2023-05-24 | End: 2023-05-24 | Stop reason: HOSPADM

## 2023-05-24 RX ORDER — HYDROMORPHONE HYDROCHLORIDE 1 MG/ML
0.25 INJECTION, SOLUTION INTRAMUSCULAR; INTRAVENOUS; SUBCUTANEOUS EVERY 5 MIN PRN
Status: DISCONTINUED | OUTPATIENT
Start: 2023-05-24 | End: 2023-05-24 | Stop reason: HOSPADM

## 2023-05-24 RX ORDER — LABETALOL 20 MG/4 ML (5 MG/ML) INTRAVENOUS SYRINGE
PRN
Status: DISCONTINUED | OUTPATIENT
Start: 2023-05-24 | End: 2023-05-24 | Stop reason: SDUPTHER

## 2023-05-24 RX ORDER — HYDROCODONE BITARTRATE AND ACETAMINOPHEN 10; 325 MG/1; MG/1
1 TABLET ORAL EVERY 6 HOURS PRN
Status: DISCONTINUED | OUTPATIENT
Start: 2023-05-24 | End: 2023-05-24 | Stop reason: HOSPADM

## 2023-05-24 RX ORDER — SUCCINYLCHOLINE CHLORIDE 20 MG/ML
INJECTION INTRAMUSCULAR; INTRAVENOUS PRN
Status: DISCONTINUED | OUTPATIENT
Start: 2023-05-24 | End: 2023-05-24 | Stop reason: SDUPTHER

## 2023-05-24 RX ORDER — ONDANSETRON 2 MG/ML
INJECTION INTRAMUSCULAR; INTRAVENOUS PRN
Status: DISCONTINUED | OUTPATIENT
Start: 2023-05-24 | End: 2023-05-24 | Stop reason: SDUPTHER

## 2023-05-24 RX ORDER — LIDOCAINE HYDROCHLORIDE 10 MG/ML
INJECTION, SOLUTION EPIDURAL; INFILTRATION; INTRACAUDAL; PERINEURAL PRN
Status: DISCONTINUED | OUTPATIENT
Start: 2023-05-24 | End: 2023-05-24 | Stop reason: SDUPTHER

## 2023-05-24 RX ORDER — ALBUTEROL SULFATE 90 UG/1
AEROSOL, METERED RESPIRATORY (INHALATION) PRN
Status: DISCONTINUED | OUTPATIENT
Start: 2023-05-24 | End: 2023-05-24 | Stop reason: SDUPTHER

## 2023-05-24 RX ADMIN — MIDAZOLAM 2 MG: 1 INJECTION INTRAMUSCULAR; INTRAVENOUS at 10:45

## 2023-05-24 RX ADMIN — SUGAMMADEX 200 MG: 100 INJECTION, SOLUTION INTRAVENOUS at 10:37

## 2023-05-24 RX ADMIN — DEXMEDETOMIDINE HYDROCHLORIDE 4 MCG: 100 INJECTION, SOLUTION, CONCENTRATE INTRAVENOUS at 09:55

## 2023-05-24 RX ADMIN — LABETALOL 20 MG/4 ML (5 MG/ML) INTRAVENOUS SYRINGE 5 MG: at 10:08

## 2023-05-24 RX ADMIN — ROCURONIUM BROMIDE 10 MG: 10 INJECTION, SOLUTION INTRAVENOUS at 07:36

## 2023-05-24 RX ADMIN — FAMOTIDINE 20 MG: 10 INJECTION, SOLUTION INTRAVENOUS at 07:00

## 2023-05-24 RX ADMIN — HYDROMORPHONE HYDROCHLORIDE 0.5 MG: 1 INJECTION, SOLUTION INTRAMUSCULAR; INTRAVENOUS; SUBCUTANEOUS at 11:21

## 2023-05-24 RX ADMIN — FENTANYL CITRATE 100 MCG: 0.05 INJECTION, SOLUTION INTRAMUSCULAR; INTRAVENOUS at 08:32

## 2023-05-24 RX ADMIN — DEXMEDETOMIDINE HYDROCHLORIDE 18 MCG: 100 INJECTION, SOLUTION, CONCENTRATE INTRAVENOUS at 10:40

## 2023-05-24 RX ADMIN — SODIUM CHLORIDE, SODIUM LACTATE, POTASSIUM CHLORIDE, AND CALCIUM CHLORIDE: 600; 310; 30; 20 INJECTION, SOLUTION INTRAVENOUS at 08:31

## 2023-05-24 RX ADMIN — LIDOCAINE HYDROCHLORIDE 50 MG: 10 INJECTION, SOLUTION EPIDURAL; INFILTRATION; INTRACAUDAL; PERINEURAL at 07:36

## 2023-05-24 RX ADMIN — SODIUM CHLORIDE, SODIUM LACTATE, POTASSIUM CHLORIDE, AND CALCIUM CHLORIDE: 600; 310; 30; 20 INJECTION, SOLUTION INTRAVENOUS at 06:26

## 2023-05-24 RX ADMIN — CEFAZOLIN 3000 MG: 2 INJECTION, POWDER, FOR SOLUTION INTRAMUSCULAR; INTRAVENOUS at 08:22

## 2023-05-24 RX ADMIN — PROPOFOL 200 MG: 10 INJECTION, EMULSION INTRAVENOUS at 07:36

## 2023-05-24 RX ADMIN — ONDANSETRON 4 MG: 2 INJECTION INTRAMUSCULAR; INTRAVENOUS at 10:29

## 2023-05-24 RX ADMIN — SUCCINYLCHOLINE CHLORIDE 120 MG: 20 INJECTION, SOLUTION INTRAMUSCULAR; INTRAVENOUS at 07:37

## 2023-05-24 RX ADMIN — ROCURONIUM BROMIDE 50 MG: 10 INJECTION, SOLUTION INTRAVENOUS at 08:28

## 2023-05-24 RX ADMIN — FENTANYL CITRATE 50 MCG: 0.05 INJECTION, SOLUTION INTRAMUSCULAR; INTRAVENOUS at 09:59

## 2023-05-24 RX ADMIN — HYDROMORPHONE HYDROCHLORIDE 0.5 MG: 1 INJECTION, SOLUTION INTRAMUSCULAR; INTRAVENOUS; SUBCUTANEOUS at 11:33

## 2023-05-24 RX ADMIN — PROPOFOL 200 MG: 10 INJECTION, EMULSION INTRAVENOUS at 07:42

## 2023-05-24 RX ADMIN — HYDROCODONE BITARTRATE AND ACETAMINOPHEN 1 TABLET: 10; 325 TABLET ORAL at 12:32

## 2023-05-24 RX ADMIN — DEXAMETHASONE SODIUM PHOSPHATE 8 MG: 10 INJECTION, SOLUTION INTRAMUSCULAR; INTRAVENOUS at 07:53

## 2023-05-24 RX ADMIN — DEXMEDETOMIDINE HYDROCHLORIDE 4 MCG: 100 INJECTION, SOLUTION, CONCENTRATE INTRAVENOUS at 09:40

## 2023-05-24 RX ADMIN — MIDAZOLAM 2 MG: 1 INJECTION INTRAMUSCULAR; INTRAVENOUS at 07:30

## 2023-05-24 RX ADMIN — Medication 4 PUFF: at 07:49

## 2023-05-24 RX ADMIN — FENTANYL CITRATE 50 MCG: 0.05 INJECTION, SOLUTION INTRAMUSCULAR; INTRAVENOUS at 07:36

## 2023-05-24 RX ADMIN — FENTANYL CITRATE 50 MCG: 0.05 INJECTION, SOLUTION INTRAMUSCULAR; INTRAVENOUS at 09:25

## 2023-05-24 RX ADMIN — SODIUM CHLORIDE, SODIUM LACTATE, POTASSIUM CHLORIDE, AND CALCIUM CHLORIDE: 600; 310; 30; 20 INJECTION, SOLUTION INTRAVENOUS at 10:51

## 2023-05-24 RX ADMIN — ROCURONIUM BROMIDE 40 MG: 10 INJECTION, SOLUTION INTRAVENOUS at 07:42

## 2023-05-24 ASSESSMENT — PAIN SCALES - GENERAL
PAINLEVEL_OUTOF10: 8
PAINLEVEL_OUTOF10: 7
PAINLEVEL_OUTOF10: 8

## 2023-05-24 ASSESSMENT — PAIN DESCRIPTION - DESCRIPTORS
DESCRIPTORS: ACHING
DESCRIPTORS: ACHING

## 2023-05-24 ASSESSMENT — PAIN DESCRIPTION - LOCATION
LOCATION: NECK
LOCATION: INCISION;NECK

## 2023-05-24 ASSESSMENT — PAIN DESCRIPTION - PAIN TYPE
TYPE: SURGICAL PAIN
TYPE: SURGICAL PAIN

## 2023-05-24 ASSESSMENT — LIFESTYLE VARIABLES: SMOKING_STATUS: 1

## 2023-05-24 NOTE — BRIEF OP NOTE
Brief Postoperative Note      Patient: Janeen Hernandez  YOB: 1980  MRN: [id number]    Date of Procedure: 5/24/2023    Pre-Op Diagnosis: Cervical spondylosis with radiculopathy    Post-Op Diagnosis: Same       Procedure(s):  ACDF C4-5, C5-6    Surgeon(s):  Sangeeta Najera DO    Assistant:  * No surgical staff found *    Anesthesia: General    Estimated Blood Loss (mL): less than 50     Complications: None    Specimens:   * No specimens in log *    Implants:  Implant Name Type Inv. Item Serial No.  Lot No. LRB No. Used Action   GRAFT HUM TISS [id number] ALLGRFT KALA [employer]NERSTONE - [id number]  GRAFT HUM TISS [id number] ALLGRFT KALA CORNERSTONE  [employer] [facility] TECH-WD [id number] N/A 1 Implanted   GRAFT HUM TISS [id number] ALLGRFT KALA CORNERSTONE - [id number]  GRAFT HUM TISS [id number] ALLGRFT KALA CORNERSTONE  [employer] SPINALGRAFT TECH-WD [id number] N/A 1 Implanted   PLATE SPNL Y94MB LEV 2 ANT CERV TI ZEVO - [id number]  PLATE SPNL [id number] LEV 2 ANT CERV TI ZEVO  [employer] SOFAMOR DANEK-WD  N/A 1 Implanted   SCREW SPNL L15MM DIA3. 5MM ANT CERV TI SELF DRL DEMIAN ANG - [id number]  SCREW SPNL L15MM DIA3. 5MM ANT CERV TI SELF DRL DEMIAN ANG  [employer] SOFAMOR DANEK-WD  N/A 4 Implanted   SCREW SPNL L17MM DIA3. 5MM ANT CERV TI SELF DRL DEMIAN ANG - [id number]  SCREW SPNL L17MM DIA3. 5MM ANT CERV TI SELF DRL DEMIAN ANG  [employer] SOFAMOR DANEK-WD  N/A 2 Implanted         Drains:   Urinary Catheter 05/24/23 2 Way (Active)       Findings: Good bone quality. No issues.       Electronically signed by Sangeeta Najera DO on 5/24/2023 at 10:42 AM

## 2023-05-24 NOTE — ANESTHESIA PRE PROCEDURE
Department of Anesthesiology  Preprocedure Note       Name:  Lanny Solis   Age:  43 y.o.  :  1980                                          MRN:  [id number]         Date:  2023      Surgeon: Ángela Hart):  Arlette Mohan DO    Procedure: Procedure(s):  ACDF C4-5, C5-6    Medications prior to admission:   Prior to Admission medications    Medication Sig Start Date End Date Taking? Authorizing Provider   albuterol sulfate HFA (PROVENTIL;VENTOLIN;PROAIR) 108 (90 Base) MCG/ACT inhaler Inhale 2 puffs into the lungs every 4 hours as needed 23   Historical Provider, MD   budesonide-formoterol (SYMBICORT) 160-4.5 MCG/ACT AERO Inhale 2 puffs into the lungs 2 times daily 23   Historical Provider, MD   cloNIDine (CATAPRES) 0.1 MG tablet Take 1 tablet by mouth daily    Historical Provider, MD   HYDROcodone-acetaminophen (NORCO)  MG per tablet Take 1 tablet by mouth 2 times daily. 23   Historical Provider, MD   losartan (COZAAR) 100 MG tablet Take 1 tablet by mouth daily 23   Historical Provider, MD   metoprolol succinate (TOPROL XL) 25 MG extended release tablet Take 1 tablet by mouth daily 23   Historical Provider, MD   omeprazole (PRILOSEC) 40 MG delayed release capsule Take 1 capsule by mouth daily 23   Historical Provider, MD   temazepam (RESTORIL) 15 MG capsule Take 1 capsule by mouth nightly.  23   Historical Provider, MD   traZODone (DESYREL) 150 MG tablet Take 1 tablet by mouth nightly    Historical Provider, MD       Current medications:    Current Facility-Administered Medications   Medication Dose Route Frequency Provider Last Rate Last Admin    sodium chloride flush 0.9 % injection 5-40 mL  5-40 mL IntraVENous 2 times per day Clement Castlewood, APRN        sodium chloride flush 0.9 % injection 5-40 mL  5-40 mL IntraVENous PRN Clement Castlewood, APRN        0.9 % sodium chloride infusion   IntraVENous PRN Clement Castlewood, APRN        acetaminophen (TYLENOL) tablet 1,000 mg

## 2023-05-24 NOTE — DISCHARGE INSTRUCTIONS
Your incision is to stay dry for one week. You should not remove or change the dressing. If the dressing becomes wet or saturated call the office to determine what should be done. SPONGE BATHE ONLY. YOU MAY PREFER SOFT FOODS. WEAR CERVICAL COLLAR AS DIRECTED   It is normal to have some swelling at the incision site. Do not lift anything heavier than a coffee cup and newspaper! No exceptions. Do not push and pull with your arms. This includes sweeping, mopping, vacuuming, and removing laundry from the washer and dryer. You may fold clothes but do not lift the basket full of clothes! Do not drive until cleared by Dr. Chicho Hernández. This will be AT LEAST 2 weeks, it could be longer. Limit gentle twisting, you should not do anything extreme! Do not bend down to pick anything up. Walk every day. Walking around in your house is fine to begin. Increase distance slowly. Walk short distances several times a day instead of long distances once a day. You have symptoms of infection such as: Increased pain, swelling, warmth, or redness, Red streaks leading from the incision, Pus draining from the incision, A fever please call the office immediately.

## 2023-05-24 NOTE — INTERVAL H&P NOTE
Update History & Physical    The patient's History and Physical of May 2, 2023 was reviewed with the patient and I examined the patient. There was no change. The surgical site was confirmed by the patient and me. Plan: The risks, benefits, expected outcome, and alternative to the recommended procedure have been discussed with the patient. Patient understands and wants to proceed with the procedure.      Electronically signed by Sangeeta Najera DO on 5/24/2023 at 6:36 AM

## 2023-05-24 NOTE — ANESTHESIA POSTPROCEDURE EVALUATION
Department of Anesthesiology  Postprocedure Note    Patient: Heather Lerner  MRN: [id number]  YOB: 1980  Date of evaluation: 5/24/2023      Procedure Summary     Date: 05/24/23 Room / Location: [address]    Anesthesia Start: 0730 Anesthesia Stop: 1636    Procedure: ACDF C4-5, C5-6 Diagnosis:       Neck pain      (Neck pain [M54.2])    Surgeons: Vannessa Beauchamp DO Responsible Provider: CHARI Barriga CRNA    Anesthesia Type: general ASA Status: 2          Anesthesia Type: No value filed. Tremayne Phase I: Tremayne Score: 5    Tremayne Phase II:        Anesthesia Post Evaluation    Patient location during evaluation: PACU  Patient participation: complete - patient cannot participate  Level of consciousness: responsive to light touch  Airway patency: positional obstruction (pt has sever sheng.  c-pap ordered for pacu)  Nausea & Vomiting: no nausea and no vomiting  Complications: no  Cardiovascular status: hemodynamically stable  Respiratory status: acceptable, spontaneous ventilation, nonlabored ventilation, face mask, oral airway and CPAP  Hydration status: stable  Comments: /79   Pulse 80   Temp 97 °F (36.1 °C) (Temporal)   Resp 29   Ht 6' 4\" (1.93 m)   Wt (!) 323 lb (146.5 kg)   SpO2 (!) 89%   BMI 39.32 kg/m²

## 2023-05-24 NOTE — PROGRESS NOTES
CLINICAL PHARMACY NOTE: MEDS TO BEDS    Total # of Prescriptions Filled: 1   The following medications were delivered to the patient:  Current Discharge Medication List        Norco  MG    Additional Documentation:    Delivered RX's to patient bedside. Also dispensed free Narcan. No copay.

## 2023-05-24 NOTE — OP NOTE
NEUROSURGICAL OPERATIVE REPORT      NAME OF SURGEON: LAYO LEMUS DO      DATE OF SERVICE: 5/24/2023      PREOPERATIVE DIAGNOSES    C4-C5 and C5-C6 cervical spondylosis with right C5 and C6 radiculopathies, recalcitrant to nonoperative management. POSTOPERATIVE DIAGNOSES    Same      OPERATIVE PROCEDURES  1. C4-C5 and C5-C6 anterior cervical discectomy with resection of marginal osteophytes projecting into the foramina for decompression the patient's C5 and C6 nerve root utilizing the operating microscope and microdissection technique. 2. C4-C5 and C5-6 anterior cervical fusion utilizing structural allograft bone and local autograft bone. 3 Insertion of intervertebral biomechanical device C4-C5 and F7-2.  4. Application of anterior cervical plate C4, C5 and C6. SURGEON  Duke Bain. DO Sebastián      FIRST ASSIST    [place], [place]    ESTIMATED BLOOD LOSS:  Minimal       HISTORY  70-year-old male with a history of previous lumbar surgery per Dr. Umesh Roque over 20 years ago who presented to our clinic with bilateral upper extremity pain with the right side being greater than the left. He stated the pain would radiate to tricep forearm into the hand. He said his pain was mostly located in his arms. He had numbness of the first 4 digits on the right. He stated that the majority of his pains were associated with the upper extremity pain. Tried a multitude of nonoperative treatments that included nonsteroidal anti-inflammatory drugs, muscle relaxers, opiates and physical therapy without any significant success. Upon neurologic exam he was noted to have 5/5 strength bilateral upper extremities. He had decreased pinprick sensation in the left entire hands over the thumb. He had some right hand decreased pinprick sensation as well. MRI of the cervical spine revealed significant spondylosis. At C4-5, there was moderate to severe right and mild to moderate left foraminal stenosis.   At C5-6, there was

## 2023-05-24 NOTE — PROGRESS NOTES
PATIENT AND S.O. INSTRUCTED TO WEAR HOME CPAP AS DIRECTED AND AT ANY TIME FOLLOWING PAIN MEDICATION ESPECIALLY WHEN NAPPING OR SLEEPING, CERVICAL COLLAR TO BE WORN PER DR LEMUS'S INSTRUCTIONS, AT ALL TIMES, UNLESS SHOWERING OR IF NEEDED TO REMOVE WHEN EATING PER S.O.'S REPORT AS TOLD TO HER BY  [facility]

## 2023-05-31 ENCOUNTER — OFFICE VISIT (OUTPATIENT)
Dept: NEUROSURGERY | Age: 43
End: 2023-05-31

## 2023-05-31 VITALS
BODY MASS INDEX: 38.36 KG/M2 | RESPIRATION RATE: 18 BRPM | HEIGHT: 76 IN | SYSTOLIC BLOOD PRESSURE: 146 MMHG | HEART RATE: 99 BPM | WEIGHT: 315 LBS | OXYGEN SATURATION: 96 % | DIASTOLIC BLOOD PRESSURE: 102 MMHG

## 2023-05-31 DIAGNOSIS — Z48.89 ENCOUNTER FOR POST SURGICAL WOUND CHECK: Primary | ICD-10-CM

## 2023-05-31 DIAGNOSIS — Z98.1 S/P CERVICAL SPINAL FUSION: ICD-10-CM

## 2023-05-31 PROCEDURE — 99024 POSTOP FOLLOW-UP VISIT: CPT | Performed by: NURSE PRACTITIONER

## 2023-05-31 RX ORDER — TIZANIDINE 4 MG/1
4 TABLET ORAL 3 TIMES DAILY PRN
Qty: 90 TABLET | Refills: 2 | Status: SHIPPED | OUTPATIENT
Start: 2023-05-31 | End: 2023-06-30

## 2023-05-31 ASSESSMENT — ENCOUNTER SYMPTOMS
EYES NEGATIVE: 1
RESPIRATORY NEGATIVE: 1
GASTROINTESTINAL NEGATIVE: 1

## 2023-05-31 NOTE — PROGRESS NOTES
Review of Systems   Constitutional: Negative. HENT: Negative. Eyes: Negative. Respiratory: Negative. Cardiovascular: Negative. Gastrointestinal: Negative. Genitourinary: Negative. Musculoskeletal:  Positive for joint pain, myalgias and neck pain. Skin: Negative. Neurological: Negative. Endo/Heme/Allergies: Negative. Psychiatric/Behavioral: Negative.
tablet, Take 1 tablet by mouth daily, Disp: , Rfl:     losartan (COZAAR) 100 MG tablet, Take 1 tablet by mouth daily, Disp: , Rfl:     metoprolol succinate (TOPROL XL) 25 MG extended release tablet, Take 1 tablet by mouth daily, Disp: , Rfl:     omeprazole (PRILOSEC) 40 MG delayed release capsule, Take 1 capsule by mouth daily, Disp: , Rfl:     temazepam (RESTORIL) 15 MG capsule, Take 1 capsule by mouth nightly., Disp: , Rfl:     traZODone (DESYREL) 150 MG tablet, Take 1 tablet by mouth nightly, Disp: , Rfl:   Patient has no known allergies. Social History  Social History     Tobacco Use   Smoking Status Every Day    Packs/day: 1.00    Years: 25.00    Pack years: 25.00    Types: Cigarettes    Start date: 2/9/1993   Smokeless Tobacco Current    Types: Snuff   Tobacco Comments    DOWN TO 1/2 PPD  NOW AND DIPS OCC     Social History     Substance and Sexual Activity   Alcohol Use Never         Family History   Problem Relation Age of Onset    No Known Problems Mother     High Blood Pressure Father     Diabetes Father        Review of Systems:  Constitutional: Negative. HENT: Negative. Eyes: Negative. Respiratory: Negative. Cardiovascular: Negative. Gastrointestinal: Negative. Genitourinary: Negative. Musculoskeletal:  Positive for joint pain, myalgias and neck pain. Skin: Negative. Neurological: Negative. Endo/Heme/Allergies: Negative. Psychiatric/Behavioral: Negative. PHYSICAL EXAM:  Vitals:    05/31/23 1317   BP: (!) 146/102   Pulse: 99   Resp: 18   SpO2: 96%     Constitutional: The patient appears well-developed andwell-nourished.    Eyes - conjunctiva normal.  Conjugate gaze  Ear, nose, throat -No scars, masses, or lesions over external nose or ears, no atrophy of tongue  Neck-symmetric, no masses noted, no jugular vein distension  Respiration- chest wall appears symmetric, goodexpansion, normal effort without use of accessory muscles  Musculoskeletal - no significant

## 2023-06-22 ENCOUNTER — OFFICE VISIT (OUTPATIENT)
Dept: NEUROSURGERY | Age: 43
End: 2023-06-22

## 2023-06-22 ENCOUNTER — HOSPITAL ENCOUNTER (OUTPATIENT)
Dept: GENERAL RADIOLOGY | Age: 43
Discharge: HOME | End: 2023-06-22
Payer: COMMERCIAL

## 2023-06-22 VITALS
RESPIRATION RATE: 18 BRPM | OXYGEN SATURATION: 96 % | HEIGHT: 76 IN | SYSTOLIC BLOOD PRESSURE: 132 MMHG | HEART RATE: 85 BPM | DIASTOLIC BLOOD PRESSURE: 110 MMHG | BODY MASS INDEX: 38.36 KG/M2 | WEIGHT: 315 LBS

## 2023-06-22 DIAGNOSIS — Z98.1 S/P CERVICAL SPINAL FUSION: Primary | ICD-10-CM

## 2023-06-22 DIAGNOSIS — Z98.1 S/P CERVICAL SPINAL FUSION: ICD-10-CM

## 2023-06-22 PROCEDURE — 99024 POSTOP FOLLOW-UP VISIT: CPT | Performed by: NEUROLOGICAL SURGERY

## 2023-06-22 PROCEDURE — 72040 X-RAY EXAM NECK SPINE 2-3 VW: CPT

## 2023-06-22 ASSESSMENT — ENCOUNTER SYMPTOMS
GASTROINTESTINAL NEGATIVE: 1
EYES NEGATIVE: 1
RESPIRATORY NEGATIVE: 1

## 2023-06-22 NOTE — PROGRESS NOTES
[facility] Office Visit      Chief Complaint   Patient presents with    Follow-up     1 month ACDF. Patient states overall he is doing alright but has noticed pressure in the back of his neck. Results     XR Cervical Spine (6/22/2023)    Numbness     Patient denies symptoms of numbness/tingling in his BUE. HISTORY OF PRESENT ILLNESS:      Cruz Sampson is a 37 y.o. male who underwent an ACDF C4-5, C5-6 for cervical spondylosis with radiculopathy on 5/24/2023 and now he is 1 month out from his surgery. Prior to surgery he complained of neck pain and bilateral upper extremity pain right > left that traveled into the posterior lateral aspect of the arms into the wrist.  Did describe some numbness of the first 3 digits. Today he states that he is doing well. He no longer has the arm pain, numbness, or paresthesias. His only complaint today is some pressure in the back of his neck. No dysphagia.        Past Medical History:   Diagnosis Date    Hypertension        Past Surgical History:   Procedure Laterality Date    BACK SURGERY      LUMBAR FUSION    CERVICAL FUSION N/A 5/24/2023    ACDF C4-5, C5-6 performed by Josee White DO at [facility] OR        Medications    Current Outpatient Medications:     tiZANidine (ZANAFLEX) 4 MG tablet, Take 1 tablet by mouth 3 times daily as needed (spasms), Disp: 90 tablet, Rfl: 2    albuterol sulfate HFA (PROVENTIL;VENTOLIN;PROAIR) 108 (90 Base) MCG/ACT inhaler, Inhale 2 puffs into the lungs every 4 hours as needed, Disp: , Rfl:     budesonide-formoterol (SYMBICORT) 160-4.5 MCG/ACT AERO, Inhale 2 puffs into the lungs 2 times daily, Disp: , Rfl:     cloNIDine (CATAPRES) 0.1 MG tablet, Take 1 tablet by mouth daily, Disp: , Rfl:     losartan (COZAAR) 100 MG tablet, Take 1 tablet by mouth daily, Disp: , Rfl:     metoprolol succinate (TOPROL XL) 25 MG extended release tablet, Take 1 tablet by mouth daily, Disp: , Rfl:     omeprazole (PRILOSEC) 40 MG

## 2023-07-14 ENCOUNTER — OFFICE VISIT (OUTPATIENT)
Dept: NEUROSURGERY | Age: 43
End: 2023-07-14

## 2023-07-14 VITALS
SYSTOLIC BLOOD PRESSURE: 142 MMHG | HEART RATE: 89 BPM | HEIGHT: 76 IN | WEIGHT: 315 LBS | RESPIRATION RATE: 18 BRPM | OXYGEN SATURATION: 96 % | BODY MASS INDEX: 38.36 KG/M2 | DIASTOLIC BLOOD PRESSURE: 92 MMHG

## 2023-07-14 DIAGNOSIS — Z98.1 S/P CERVICAL SPINAL FUSION: Primary | ICD-10-CM

## 2023-07-14 PROCEDURE — 99024 POSTOP FOLLOW-UP VISIT: CPT | Performed by: NURSE PRACTITIONER

## 2023-07-14 ASSESSMENT — ENCOUNTER SYMPTOMS
GASTROINTESTINAL NEGATIVE: 1
RESPIRATORY NEGATIVE: 1
EYES NEGATIVE: 1

## 2023-07-14 NOTE — PROGRESS NOTES
[place] [address] Office Visit      Chief Complaint   Patient presents with    Follow-up     Patient is here for follow up s/p ACDF C4-5, C5-6 on 5/24/23. Patient needs to discuss return to work status. He states he is feeling good and has no concerns. He states he feels he is ready to return to work. HISTORY OF PRESENT ILLNESS:      Belkys Betancourt is a 37 y.o. male who underwent an ACDF C4-5, C5-6 for cervical spondylosis with radiculopathy on 5/24/2023 and now he is 2 months out from his surgery. Prior to surgery he complained of neck pain and bilateral upper extremity pain right > left that traveled into the posterior lateral aspect of the arms into the wrist.  Did describe some numbness of the first 3 digits. Today he states that he is doing well. He no longer has the arm pain, numbness, or paresthesias. No dysphagia. He is very satisfied with his surgery thus far. He states he is ready to return to work light duty. He has no additional concerns.          Past Medical History:   Diagnosis Date    Hypertension        Past Surgical History:   Procedure Laterality Date    BACK SURGERY      LUMBAR FUSION    CERVICAL FUSION N/A 5/24/2023    ACDF C4-5, C5-6 performed by Kena Starks DO at [facility] OR        Medications    Current Outpatient Medications:     albuterol sulfate HFA (PROVENTIL;VENTOLIN;PROAIR) 108 (90 Base) MCG/ACT inhaler, Inhale 2 puffs into the lungs every 4 hours as needed, Disp: , Rfl:     budesonide-formoterol (SYMBICORT) 160-4.5 MCG/ACT AERO, Inhale 2 puffs into the lungs 2 times daily, Disp: , Rfl:     cloNIDine (CATAPRES) 0.1 MG tablet, Take 1 tablet by mouth daily, Disp: , Rfl:     losartan (COZAAR) 100 MG tablet, Take 1 tablet by mouth daily, Disp: , Rfl:     metoprolol succinate (TOPROL XL) 25 MG extended release tablet, Take 1 tablet by mouth daily, Disp: , Rfl:     omeprazole (PRILOSEC) 40 MG delayed release capsule, Take 1 capsule by mouth daily, Disp: , Rfl:

## 2023-07-17 ENCOUNTER — TELEPHONE (OUTPATIENT)
Dept: NEUROSURGERY | Age: 43
End: 2023-07-17

## 2023-07-17 NOTE — TELEPHONE ENCOUNTER
Patient called to request 25 lbs restriction lifted. He states that he would like to return to work with no restrictions.

## 2023-08-04 ENCOUNTER — HOSPITAL ENCOUNTER (EMERGENCY)
Facility: HOSPITAL | Age: 43
Discharge: HOME | End: 2023-08-04
Payer: COMMERCIAL

## 2023-08-04 ENCOUNTER — APPOINTMENT (OUTPATIENT)
Dept: GENERAL RADIOLOGY | Facility: HOSPITAL | Age: 43
End: 2023-08-04
Payer: COMMERCIAL

## 2023-08-04 VITALS
RESPIRATION RATE: 22 BRPM | DIASTOLIC BLOOD PRESSURE: 108 MMHG | SYSTOLIC BLOOD PRESSURE: 140 MMHG | HEART RATE: 106 BPM | OXYGEN SATURATION: 96 % | BODY MASS INDEX: 37.75 KG/M2 | WEIGHT: 310 LBS | TEMPERATURE: 97.8 F | HEIGHT: 76 IN

## 2023-08-04 DIAGNOSIS — M54.50 BILATERAL LOW BACK PAIN WITHOUT SCIATICA, UNSPECIFIED CHRONICITY: Primary | ICD-10-CM

## 2023-08-04 LAB
BILIRUB UR QL STRIP: NEGATIVE
CLARITY UR: CLEAR
COLOR UR: YELLOW
GLUCOSE UR STRIP-MCNC: NEGATIVE MG/DL
HGB UR QL STRIP.AUTO: NEGATIVE
KETONES UR QL STRIP: NEGATIVE
LEUKOCYTE ESTERASE UR QL STRIP.AUTO: NEGATIVE
NITRITE UR QL STRIP: NEGATIVE
PH UR STRIP.AUTO: 6 [PH] (ref 5–8)
PROT UR QL STRIP: NEGATIVE
SP GR UR STRIP: 1.03 (ref 1–1.03)
UROBILINOGEN UR QL STRIP: NORMAL

## 2023-08-04 PROCEDURE — 81003 URINALYSIS AUTO W/O SCOPE: CPT

## 2023-08-04 PROCEDURE — 99283 EMERGENCY DEPT VISIT LOW MDM: CPT

## 2023-08-04 PROCEDURE — 72110 X-RAY EXAM L-2 SPINE 4/>VWS: CPT

## 2023-08-04 PROCEDURE — 25010000002 DEXAMETHASONE PER 1 MG

## 2023-08-04 PROCEDURE — 96372 THER/PROPH/DIAG INJ SC/IM: CPT

## 2023-08-04 RX ORDER — KETOROLAC TROMETHAMINE 10 MG/1
10 TABLET, FILM COATED ORAL ONCE
Status: COMPLETED | OUTPATIENT
Start: 2023-08-04 | End: 2023-08-04

## 2023-08-04 RX ORDER — DEXAMETHASONE SODIUM PHOSPHATE 10 MG/ML
10 INJECTION INTRAMUSCULAR; INTRAVENOUS ONCE
Status: COMPLETED | OUTPATIENT
Start: 2023-08-04 | End: 2023-08-04

## 2023-08-04 RX ADMIN — KETOROLAC TROMETHAMINE 10 MG: 10 TABLET, FILM COATED ORAL at 21:12

## 2023-08-04 RX ADMIN — DEXAMETHASONE SODIUM PHOSPHATE 10 MG: 10 INJECTION INTRAMUSCULAR; INTRAVENOUS at 20:08

## 2023-08-04 NOTE — Clinical Note
[facility] EMERGENCY DEPARTMENT  [address]  [facility] [address]  Phone: [phone]    Miguel Willis was seen and treated in our emergency department on 8/4/2023.  He may return to work on 08/06/2023.         Thank you for choosing [facility].    Pamela Moulton APRN

## 2023-08-05 NOTE — ED PROVIDER NOTES
Subjective   History of Present Illness  Patient is a 43 year old male who presents to the emergency department with complaints of back pain. He stated that the pain started yesterday when he woke up and that it initially started in his left hip, however the pain is in his lower back now. He describes this as a sharp pain in his lower back. It does not radiate anywhere. He denies injury or trauma to the area. He denies loss of sensation. Denies loss of bowel or bladder. Denies any urinary symptoms. He states he has Lortab and muscle relaxants at home. He has past medical history of hypertension, GERD, low back pain, and back surgery.    Review of Systems   Musculoskeletal:  Positive for back pain.   All other systems reviewed and are negative.    Past Medical History:   Diagnosis Date    GERD (gastroesophageal reflux disease)     Hypertension     Low back pain        No Known Allergies    Past Surgical History:   Procedure Laterality Date    BACK SURGERY         Family History   Problem Relation Age of Onset    Cancer Mother     Diabetes Father     Hypertension Father        Social History     Socioeconomic History    Marital status: Single   Tobacco Use    Smoking status: Every Day     Packs/day: 1.00     Types: Cigarettes    Smokeless tobacco: Never   Substance and Sexual Activity    Drug use: Never    Sexual activity: Yes     Partners: Female           Objective   Physical Exam  Vitals and nursing note reviewed.   Constitutional:       Appearance: Normal appearance. He is normal weight. He is not ill-appearing or toxic-appearing.   HENT:      Head: Normocephalic.   Cardiovascular:      Rate and Rhythm: Normal rate and regular rhythm.      Pulses: Normal pulses.      Heart sounds: Normal heart sounds.   Pulmonary:      Effort: Pulmonary effort is normal.      Breath sounds: Normal breath sounds.   Abdominal:      General: Abdomen is flat. Bowel sounds are normal. There is no distension.      Palpations: Abdomen is  soft.      Tenderness: There is no abdominal tenderness.   Musculoskeletal:         General: No swelling, deformity or signs of injury. Normal range of motion.      Cervical back: Normal range of motion and neck supple.      Lumbar back: Tenderness and bony tenderness present. No swelling, edema, deformity, signs of trauma or lacerations.   Skin:     General: Skin is warm and dry.      Findings: No bruising or erythema.   Neurological:      General: No focal deficit present.      Mental Status: He is alert and oriented to person, place, and time. Mental status is at baseline.      Sensory: No sensory deficit.      Comments: No saddle anesthesia.    Psychiatric:         Mood and Affect: Mood normal.         Behavior: Behavior normal.         Thought Content: Thought content normal.         Judgment: Judgment normal.       Procedures           ED Course                                           Medical Decision Making  Miguel Willis is a pleasant 43 y.o. male who presents to the ED for back pain.     Patient was non-toxic and not-ill appearing on arrival.     Patient's presentation raises suspicion for differentials including, but not limited to, fracture, muscle strain, pyelonephritis, arthritis.     External (non-ED) record review: none    Given this, laboratory studies and imaging studies were ordered including urinalysis and x-ray lumbar spine.    Miguel was given IM Decadron and p.o. Toradol for symptomatic relief.    Imaging was reviewed by radiologist. X-ray lumbar spine revealed No fracture or acute osseous abnormality. Previous instrumented fusion at L3-4 with no findings to indicate pseudoarthrosis. There is grade 1 spondylolisthesis L2-3 and mild degenerative changes as described above.    Urinalysis reviewed and is unremarkable. On reevaluation, patient stated he was still having lower back pain. I discussed the imaging results and urinalysis with him. I informed him that there was no acute osseous  findings on the x-ray. I also informed him that his urinalysis was negative for infection. I encouraged him to follow with primary care provider as soon as possible to reassess symptoms.  He may need further imaging done on an outpatient basis.  Patient states that he has pain medication at home.  I did offer to prescribe short-term oral steroid, however patient declined this.  Patient also refused discharge vital signs per nursing staff.  I advised him to return the emergency department for any new or worsening symptoms. The patient verbalized understanding of the discharge instructions and agreed with them.     Miguel was discharged in stable condition.    Signed by:   FELICITAS Freeman 8/4/2023 23:50 CDT     Dragon disclaimer:  Part of this note may be an electronic transcription/translation of spoken language to printed text using the Dragon Dictation System.    Problems Addressed:  Bilateral low back pain without sciatica, unspecified chronicity: complicated acute illness or injury    Amount and/or Complexity of Data Reviewed  Radiology: ordered.    Risk  Prescription drug management.        Final diagnoses:   Bilateral low back pain without sciatica, unspecified chronicity       ED Disposition  ED Disposition       ED Disposition   Discharge    Condition   Stable    Comment   --               Edie Newman, APRN  [address] [address]  [phone]    Schedule an appointment as soon as possible for a visit in 1 day      [facility] EMERGENCY DEPARTMENT  [address] [place] [id number] [phone]  Go to   If symptoms worsen         Medication List      No changes were made to your prescriptions during this visit.            Pamela Moulton APRN  08/04/23 3983

## 2023-08-05 NOTE — DISCHARGE INSTRUCTIONS
It was very nice to meet you, Miguel. Thank you for allowing us to take care of you today at [facility].    Your evaluation today did not show any emergent findings or have any emergent indications for admission to the hospital.     Please understand that an ER evaluation is just the start of your evaluation. We will do what we can, but we are often unable to fully figure out what is causing your symptoms from one evaluation. Thus, our primary goal is to determine whether you need to be evaluated in the hospital or if it is safe for you to go home and see other doctors such as a primary care physician or a specialist on an outpatient basis.     Like we discussed, it is VERY IMPORTANT that you follow up with your primary care doctor (call them to set up an appointment) within the next few days or as soon as possible so that you can be re-evaluated for improvement in your symptoms or for any other questions.     Please return to the emergency room within 12-48 hours if you experience any new or worsening symptoms.

## 2023-08-08 ENCOUNTER — OFFICE VISIT (OUTPATIENT)
Dept: NEUROSURGERY | Age: 43
End: 2023-08-08
Payer: COMMERCIAL

## 2023-08-08 ENCOUNTER — HOSPITAL ENCOUNTER (OUTPATIENT)
Dept: GENERAL RADIOLOGY | Age: 43
Discharge: HOME | End: 2023-08-08
Payer: COMMERCIAL

## 2023-08-08 VITALS
BODY MASS INDEX: 38.36 KG/M2 | OXYGEN SATURATION: 98 % | HEART RATE: 95 BPM | SYSTOLIC BLOOD PRESSURE: 136 MMHG | HEIGHT: 76 IN | DIASTOLIC BLOOD PRESSURE: 86 MMHG | RESPIRATION RATE: 18 BRPM | WEIGHT: 315 LBS

## 2023-08-08 DIAGNOSIS — Z98.1 S/P CERVICAL SPINAL FUSION: Primary | ICD-10-CM

## 2023-08-08 DIAGNOSIS — M54.2 NECK PAIN: ICD-10-CM

## 2023-08-08 DIAGNOSIS — Z98.1 S/P CERVICAL SPINAL FUSION: ICD-10-CM

## 2023-08-08 PROCEDURE — 72040 X-RAY EXAM NECK SPINE 2-3 VW: CPT

## 2023-08-08 PROCEDURE — 99213 OFFICE O/P EST LOW 20 MIN: CPT | Performed by: NEUROLOGICAL SURGERY

## 2023-08-08 RX ORDER — CARISOPRODOL 350 MG/1
350 TABLET ORAL 3 TIMES DAILY PRN
Qty: 90 TABLET | Refills: 1 | Status: SHIPPED | OUTPATIENT
Start: 2023-08-08 | End: 2023-10-07

## 2023-08-08 ASSESSMENT — ENCOUNTER SYMPTOMS
GASTROINTESTINAL NEGATIVE: 1
EYES NEGATIVE: 1
RESPIRATORY NEGATIVE: 1

## 2023-08-08 NOTE — PROGRESS NOTES
Review of Systems   Constitutional: Negative. HENT: Negative. Eyes: Negative. Respiratory: Negative. Cardiovascular: Negative. Gastrointestinal: Negative. Genitourinary: Negative. Musculoskeletal:  Positive for joint pain, myalgias and neck pain. Skin: Negative. Neurological:  Positive for tingling and headaches. Endo/Heme/Allergies: Negative. Psychiatric/Behavioral: Negative.
Take 1 capsule by mouth daily, Disp: , Rfl:     temazepam (RESTORIL) 15 MG capsule, Take 1 capsule by mouth nightly., Disp: , Rfl:     traZODone (DESYREL) 150 MG tablet, Take 1 tablet by mouth nightly, Disp: , Rfl:   Patient has no known allergies. Social History  Social History     Tobacco Use   Smoking Status Every Day    Packs/day: 1.00    Years: 25.00    Pack years: 25.00    Types: Cigarettes    Start date: 2/9/1993   Smokeless Tobacco Current    Types: Snuff   Tobacco Comments    DOWN TO 1/2 PPD  NOW AND DIPS OCC     Social History     Substance and Sexual Activity   Alcohol Use Never         Family History   Problem Relation Age of Onset    No Known Problems Mother     High Blood Pressure Father     Diabetes Father        Review of Systems:  Constitutional: Negative. HENT: Negative. Eyes: Negative. Respiratory: Negative. Cardiovascular: Negative. Gastrointestinal: Negative. Genitourinary: Negative. Musculoskeletal:  Positive for joint pain, myalgias and neck pain. Skin: Negative. Neurological: Negative. Endo/Heme/Allergies: Negative. Psychiatric/Behavioral: Negative. PHYSICAL EXAM:  Vitals:    08/08/23 1416   BP: 136/86   Pulse: 95   Resp: 18   SpO2: 98%     Constitutional: The patient appears well-developed andwell-nourished. Eyes - conjunctiva normal.  Conjugate gaze  Ear, nose, throat -No scars, masses, or lesions over external nose or ears, no atrophy of tongue  Neck-symmetric, no masses noted, no jugular vein distension  Respiration- chest wall appears symmetric, goodexpansion, normal effort without use of accessory muscles  Musculoskeletal - no significant wasting of muscles noted, no bony deformities, gait no gross ataxia  Extremities-no clubbing, cyanosis or edema  Skin- warm, dry, and intact. No rash, erythema, or pallor.   Psychiatric - mood, affect, and behavior appear normal.     Neurologic Examination  Awake, Alert and oriented x 4  Normal speech pattern,

## 2023-10-05 ENCOUNTER — HOSPITAL ENCOUNTER (EMERGENCY)
Facility: HOSPITAL | Age: 43
Discharge: HOME | End: 2023-10-05
Payer: OTHER MISCELLANEOUS

## 2023-10-05 ENCOUNTER — TELEPHONE (OUTPATIENT)
Dept: NEUROSURGERY | Age: 43
End: 2023-10-05

## 2023-10-05 ENCOUNTER — APPOINTMENT (OUTPATIENT)
Dept: CT IMAGING | Facility: HOSPITAL | Age: 43
End: 2023-10-05
Payer: OTHER MISCELLANEOUS

## 2023-10-05 VITALS
RESPIRATION RATE: 18 BRPM | OXYGEN SATURATION: 99 % | BODY MASS INDEX: 36.65 KG/M2 | SYSTOLIC BLOOD PRESSURE: 155 MMHG | WEIGHT: 301 LBS | HEART RATE: 75 BPM | TEMPERATURE: 98.2 F | HEIGHT: 76 IN | DIASTOLIC BLOOD PRESSURE: 89 MMHG

## 2023-10-05 DIAGNOSIS — R20.0 LEFT ARM NUMBNESS: ICD-10-CM

## 2023-10-05 DIAGNOSIS — M54.2 NECK PAIN ON LEFT SIDE: Primary | ICD-10-CM

## 2023-10-05 PROCEDURE — 25010000002 KETOROLAC TROMETHAMINE PER 15 MG: Performed by: NURSE PRACTITIONER

## 2023-10-05 PROCEDURE — 72125 CT NECK SPINE W/O DYE: CPT

## 2023-10-05 PROCEDURE — 96372 THER/PROPH/DIAG INJ SC/IM: CPT

## 2023-10-05 PROCEDURE — 99284 EMERGENCY DEPT VISIT MOD MDM: CPT

## 2023-10-05 PROCEDURE — 25010000002 MORPHINE PER 10 MG: Performed by: NURSE PRACTITIONER

## 2023-10-05 RX ORDER — KETOROLAC TROMETHAMINE 30 MG/ML
30 INJECTION, SOLUTION INTRAMUSCULAR; INTRAVENOUS ONCE
Status: COMPLETED | OUTPATIENT
Start: 2023-10-05 | End: 2023-10-05

## 2023-10-05 RX ORDER — KETOROLAC TROMETHAMINE 10 MG/1
10 TABLET, FILM COATED ORAL EVERY 6 HOURS PRN
Qty: 12 TABLET | Refills: 0 | Status: SHIPPED | OUTPATIENT
Start: 2023-10-05 | End: 2023-10-08

## 2023-10-05 RX ADMIN — KETOROLAC TROMETHAMINE 30 MG: 30 INJECTION, SOLUTION INTRAMUSCULAR; INTRAVENOUS at 12:38

## 2023-10-05 RX ADMIN — MORPHINE SULFATE 8 MG: 4 INJECTION, SOLUTION INTRAMUSCULAR; INTRAVENOUS at 12:38

## 2023-10-05 NOTE — DISCHARGE INSTRUCTIONS
Take Toradol as needed for pain.  Recommend continuing to rest and ice the area or may apply heat if that feels better.  Please follow-up with neurology as scheduled.  Return to ED with any further concerns, symptoms, or as needed.

## 2023-10-05 NOTE — TELEPHONE ENCOUNTER
Patient had an ACDF C4-5, C5-6 5/24/2023. Patient stated he was working yesterday and felt a \"pop\" in his neck and his arm went numb. I asked patient if he reported to the ER due to his symptoms. Patient stated \"no, I wanted to get in touch with you guys first\". I let patient know he could schedule a follow up but it wouldn't be for today. Scheduled patient for 10/9/2023 @ 9am. I let patient know he does need to report to the ER if he is still having the same symptoms or if things seem to be worsening. Patient thanked me and voiced understanding and stated he was going to go to [facility] ER.

## 2023-10-05 NOTE — Clinical Note
[facility] EMERGENCY DEPARTMENT  [address]  [facility] [address]  Phone: [phone]    Miguel Willis was seen and treated in our emergency department on 10/5/2023.  He may return to work on 10/09/2023.         Thank you for choosing [facility].    Maria Alejandra Chowdhury, FELICITAS

## 2023-10-05 NOTE — ED PROVIDER NOTES
Subjective   History of Present Illness  Miguel Willis is a 43-year-old male with past medical history hypertension and GERD who presents to ED today for left-sided neck pain.  Patient states that yesterday he was at work and went to throw a box to the left side and immediately felt a pop in his left side of his neck and had some left arm numbness.  Patient states that since then he has had a lot of pain going down the left side of his neck to top of left shoulder with intermittent numbness to the left arm and hand.  States this is the first time he is being evaluated since the incident.  Patient concerned since he had neck surgery earlier this year due to his slipped disc.  Patient denies headache, vision changes, weakness, chest pain, abdominal pain, or any other injuries at this time.    History provided by:  Patient   used: No      Review of Systems   Constitutional:  Negative for activity change, chills and fever.   Eyes:  Negative for visual disturbance.   Respiratory:  Negative for cough, chest tightness and shortness of breath.    Cardiovascular:  Negative for chest pain and palpitations.   Gastrointestinal:  Negative for abdominal distention, abdominal pain, diarrhea, nausea and vomiting.   Genitourinary:  Negative for dysuria.   Musculoskeletal:  Positive for myalgias (left side neck/left shoulder). Negative for arthralgias.   Skin:  Negative for color change, rash and wound.   Neurological:  Positive for numbness (left arm/hand). Negative for dizziness, weakness and headaches.   Psychiatric/Behavioral:  Negative for confusion.      Past Medical History:   Diagnosis Date    GERD (gastroesophageal reflux disease)     Hypertension     Low back pain        No Known Allergies    Past Surgical History:   Procedure Laterality Date    BACK SURGERY         Family History   Problem Relation Age of Onset    Cancer Mother     Diabetes Father     Hypertension Father        Social History      Socioeconomic History    Marital status: Single   Tobacco Use    Smoking status: Every Day     Packs/day: 1.00     Types: Cigarettes    Smokeless tobacco: Never   Substance and Sexual Activity    Drug use: Never    Sexual activity: Yes     Partners: Female           Objective   Physical Exam  Vitals and nursing note reviewed.   Constitutional:       General: He is not in acute distress.     Appearance: Normal appearance. He is not ill-appearing or toxic-appearing.   HENT:      Head: Normocephalic and atraumatic.      Nose: Nose normal.   Eyes:      Conjunctiva/sclera: Conjunctivae normal.   Cardiovascular:      Rate and Rhythm: Normal rate and regular rhythm.      Pulses: Normal pulses.   Pulmonary:      Effort: Pulmonary effort is normal.      Breath sounds: Normal breath sounds.   Abdominal:      General: Bowel sounds are normal. There is no distension.   Musculoskeletal:         General: Normal range of motion.      Right shoulder: Normal strength.      Left shoulder: Tenderness (over left trapezius muscle) present. No swelling or deformity. Normal strength.      Right hand: Normal strength. Normal sensation. Normal capillary refill. Normal pulse.      Left hand: Normal strength. Normal sensation. Normal capillary refill. Normal pulse.      Cervical back: Normal range of motion and neck supple. Tenderness (left side of neck) present.   Skin:     General: Skin is warm and dry.      Capillary Refill: Capillary refill takes less than 2 seconds.   Neurological:      General: No focal deficit present.      Mental Status: He is alert and oriented to person, place, and time.   Psychiatric:         Mood and Affect: Mood normal.         Behavior: Behavior normal.       Procedures           ED Course                                           Medical Decision Making  In summary, patient presents to ED today for left-sided neck pain with intermittent left arm and hand numbness.  On arrival, patient is ambulatory,  afebrile, and normotensive.  Differential diagnoses include, but are not limited to, cervical hardware malfunction, musculoskeletal strain, and cervical sprain.  Evaluation included CT C-spine without contrast, 30 mg Toradol IM, and 8 mg morphine IM.  Physical exam revealed left side of neck tender to palpation that extended along left trapezius muscle;  strength equal bilaterally with sensation intact to bilateral upper extremities.  CT showed no acute findings.  Advised patient to follow-up with his neurologist who did the procedure and he stated he already had an appointment Monday for recheck.  Discussed concerning signs and symptoms that would warrant returning to ED prior to appointment.  Patient prescribed Toradol to use as needed.  Patient has prescribed pain medication that he takes on a regular basis.  Patient is agreeable with this plan.    Amount and/or Complexity of Data Reviewed  Radiology: ordered. Decision-making details documented in ED Course.        Final diagnoses:   Neck pain on left side   Left arm numbness       ED Disposition  ED Disposition       ED Disposition   Discharge    Condition   Stable    Comment   --               Edie Newman, APRN  [address] [address] [phone]    Schedule an appointment as soon as possible for a visit   As needed         Medication List        New Prescriptions      ketorolac 10 MG tablet  Commonly known as: TORADOL  Take 1 tablet by mouth Every 6 (Six) Hours As Needed for Moderate Pain for up to 3 days.               Where to Get Your Medications        These medications were sent to [facility]/pharmacy #[id number] - [place] - [address] AT ACROSS FROM [address] - [phone]  - [phone]   [address] [place] [address]      Phone: [phone]   ketorolac 10 MG tablet            Maria Alejandra Chowdhury, FELICITAS  10/05/23 2292

## 2023-10-09 ENCOUNTER — OFFICE VISIT (OUTPATIENT)
Dept: NEUROSURGERY | Age: 43
End: 2023-10-09
Payer: COMMERCIAL

## 2023-10-09 VITALS
HEART RATE: 96 BPM | SYSTOLIC BLOOD PRESSURE: 134 MMHG | HEIGHT: 76 IN | BODY MASS INDEX: 36.29 KG/M2 | DIASTOLIC BLOOD PRESSURE: 88 MMHG | WEIGHT: 298 LBS | OXYGEN SATURATION: 96 % | RESPIRATION RATE: 18 BRPM

## 2023-10-09 DIAGNOSIS — Z98.1 S/P CERVICAL SPINAL FUSION: Primary | ICD-10-CM

## 2023-10-09 DIAGNOSIS — R20.2 NUMBNESS AND TINGLING IN LEFT HAND: ICD-10-CM

## 2023-10-09 DIAGNOSIS — M54.2 NECK PAIN: ICD-10-CM

## 2023-10-09 DIAGNOSIS — R20.0 NUMBNESS AND TINGLING IN LEFT HAND: ICD-10-CM

## 2023-10-09 PROCEDURE — 99214 OFFICE O/P EST MOD 30 MIN: CPT | Performed by: NURSE PRACTITIONER

## 2023-10-09 RX ORDER — CARISOPRODOL 350 MG/1
350 TABLET ORAL 3 TIMES DAILY PRN
Qty: 90 TABLET | Refills: 1 | Status: SHIPPED | OUTPATIENT
Start: 2023-10-09 | End: 2023-12-08

## 2023-10-09 ASSESSMENT — ENCOUNTER SYMPTOMS
RESPIRATORY NEGATIVE: 1
EYES NEGATIVE: 1
GASTROINTESTINAL NEGATIVE: 1

## 2023-10-09 NOTE — PROGRESS NOTES
[facility] [address] Office Visit      Chief Complaint   Patient presents with    Neck Pain    Numbness     Patient states his neck pain and numbess is about the same as last visit. He states his pain scale is a 7/10 today. HISTORY OF PRESENT ILLNESS:      Belkys Huynh is a 37 y.o. male who underwent an ACDF C4-5, C5-6 for cervical spondylosis with radiculopathy on 5/24/2023. Prior to surgery he complained of neck pain and bilateral upper extremity pain right > left that traveled into the posterior lateral aspect of the arms into the wrist.  Did describe some numbness of the first 3 digits. Today Mr. Daryl Berumen returns to clinic today to discuss his neck and LUE numbness that started after moving a case of cans on 10/05/2023. He did go to [facility] ED where CT of the cervical spine was completed that did not show any evidence of hardware malfunction or any other osseous injury. He has some numbness of the LEFT last 2 digits. No weakness. Still working. He has not been taking any medication. Past Medical History:   Diagnosis Date    Hypertension        Past Surgical History:   Procedure Laterality Date    BACK SURGERY      LUMBAR FUSION    CERVICAL FUSION N/A 5/24/2023    ACDF C4-5, C5-6 performed by Anita Hoang DO at [facility] OR        Medications    Current Outpatient Medications:     carisoprodol (SOMA) 350 MG tablet, Take 1 tablet by mouth 3 times daily as needed for Muscle spasms for up to 60 days.  Max Daily Amount: 1,050 mg, Disp: 90 tablet, Rfl: 1    albuterol sulfate HFA (PROVENTIL;VENTOLIN;PROAIR) 108 (90 Base) MCG/ACT inhaler, Inhale 2 puffs into the lungs every 4 hours as needed, Disp: , Rfl:     budesonide-formoterol (SYMBICORT) 160-4.5 MCG/ACT AERO, Inhale 2 puffs into the lungs 2 times daily, Disp: , Rfl:     cloNIDine (CATAPRES) 0.1 MG tablet, Take 1 tablet by mouth daily, Disp: , Rfl:     losartan (COZAAR) 100 MG tablet, Take 1 tablet by mouth daily, Disp: , Rfl:

## 2023-10-09 NOTE — PROGRESS NOTES
Review of Systems   Constitutional: Negative. HENT: Negative. Eyes: Negative. Respiratory: Negative. Cardiovascular: Negative. Gastrointestinal: Negative. Genitourinary: Negative. Musculoskeletal:  Positive for neck pain. Skin: Negative. Neurological:  Positive for tingling. Endo/Heme/Allergies: Negative. Psychiatric/Behavioral: Negative.

## 2024-01-04 ENCOUNTER — HOSPITAL ENCOUNTER (EMERGENCY)
Facility: HOSPITAL | Age: 44
Discharge: HOME | End: 2024-01-04
Attending: EMERGENCY MEDICINE
Payer: COMMERCIAL

## 2024-01-04 VITALS
WEIGHT: 292 LBS | HEART RATE: 88 BPM | OXYGEN SATURATION: 96 % | BODY MASS INDEX: 37.47 KG/M2 | RESPIRATION RATE: 16 BRPM | DIASTOLIC BLOOD PRESSURE: 100 MMHG | TEMPERATURE: 98 F | HEIGHT: 74 IN | SYSTOLIC BLOOD PRESSURE: 156 MMHG

## 2024-01-04 DIAGNOSIS — M54.2 NECK PAIN: Primary | ICD-10-CM

## 2024-01-04 PROCEDURE — 63710000001 PREDNISONE PER 1 MG: Performed by: EMERGENCY MEDICINE

## 2024-01-04 PROCEDURE — 99283 EMERGENCY DEPT VISIT LOW MDM: CPT

## 2024-01-04 RX ORDER — HYDROCODONE BITARTRATE AND ACETAMINOPHEN 7.5; 325 MG/1; MG/1
1 TABLET ORAL EVERY 4 HOURS PRN
Qty: 10 TABLET | Refills: 0 | Status: SHIPPED | OUTPATIENT
Start: 2024-01-04

## 2024-01-04 RX ORDER — PREDNISONE 20 MG/1
20 TABLET ORAL 2 TIMES DAILY
Qty: 14 TABLET | Refills: 0 | Status: SHIPPED | OUTPATIENT
Start: 2024-01-04

## 2024-01-04 RX ORDER — PREDNISONE 20 MG/1
20 TABLET ORAL ONCE
Status: COMPLETED | OUTPATIENT
Start: 2024-01-04 | End: 2024-01-04

## 2024-01-04 RX ORDER — HYDROCODONE BITARTRATE AND ACETAMINOPHEN 7.5; 325 MG/1; MG/1
1 TABLET ORAL ONCE
Status: COMPLETED | OUTPATIENT
Start: 2024-01-04 | End: 2024-01-04

## 2024-01-04 RX ADMIN — HYDROCODONE BITARTRATE AND ACETAMINOPHEN 1 TABLET: 7.5; 325 TABLET ORAL at 21:52

## 2024-01-04 RX ADMIN — PREDNISONE 20 MG: 20 TABLET ORAL at 21:52

## 2024-01-05 NOTE — ED PROVIDER NOTES
Subjective   History of Present Illness  Patient complains of pain in his neck.  He says that he was sitting on the couch with his wife on Monday which is 4 days ago with his arm draped over her shoulder.  It stayed there a long time and then when he finally raise his arm he had some pain in the left side of his neck and to the top of the shoulder on that side.  He thought it was just an irritated muscle so he has used some Biofreeze and some heating pads and tried to take care of it.  He did seem to get little better at first but that has gotten worse and now is having pain in the center of his neck on the backside.  He denies any other injury or trauma.  Denies any fever or chills.  He did have a spinal fusion of his neck about 9 months ago at [facility].  He has done well from that surgery.    History provided by:  Patient   used: No    Neck Pain  Pain location:  L side  Quality:  Aching  Pain radiates to:  Does not radiate  Pain severity:  Moderate  Pain is:  Same all the time  Onset quality:  Gradual  Duration:  4 days  Timing:  Constant  Progression:  Worsening  Chronicity:  New  Context: not fall, not jumping from heights, not lifting a heavy object, not MCA, not MVC, not pedestrian accident and not recent injury    Relieved by:  Nothing  Worsened by:  Nothing  Ineffective treatments:  None tried  Associated symptoms: no bladder incontinence, no bowel incontinence, no chest pain, no fever, no headaches, no leg pain, no numbness, no paresis, no photophobia, no syncope, no tingling, no visual change, no weakness and no weight loss    Risk factors: no hx of head and neck radiation, no hx of osteoporosis, no hx of spinal trauma, no recent epidural, no recent head injury and no recurrent falls        Review of Systems   Constitutional: Negative.  Negative for fever and weight loss.   HENT: Negative.     Eyes:  Negative for photophobia.   Respiratory: Negative.     Cardiovascular:  Negative for  chest pain and syncope.   Gastrointestinal: Negative.  Negative for bowel incontinence.   Genitourinary: Negative.  Negative for bladder incontinence.   Musculoskeletal: Negative.  Positive for neck pain.   Neurological: Negative.  Negative for tingling, weakness, numbness and headaches.   Hematological: Negative.    Psychiatric/Behavioral: Negative.     All other systems reviewed and are negative.      Past Medical History:   Diagnosis Date    GERD (gastroesophageal reflux disease)     Hypertension     Low back pain        No Known Allergies    Past Surgical History:   Procedure Laterality Date    BACK SURGERY         Family History   Problem Relation Age of Onset    Cancer Mother     Diabetes Father     Hypertension Father        Social History     Socioeconomic History    Marital status: Single   Tobacco Use    Smoking status: Every Day     Packs/day: 1     Types: Cigarettes    Smokeless tobacco: Never   Substance and Sexual Activity    Drug use: Never    Sexual activity: Yes     Partners: Female       Prior to Admission medications    Medication Sig Start Date End Date Taking? Authorizing Provider   cloNIDine (CATAPRES) 0.1 MG tablet Take 0.1 mg by mouth Daily.    Gideon Dawson MD   diclofenac (VOLTAREN) 75 MG EC tablet Take 75 mg by mouth 2 (Two) Times a Day.    Gdieon Dawson MD   FLUoxetine (PROzac) 10 MG capsule Take 10 mg by mouth Daily. 8/4/22   Gideon Dawson MD   furosemide (LASIX) 40 MG tablet Take 40 mg by mouth Daily.    Gideon Dawson MD   HYDROcodone-acetaminophen (NORCO) 7.5-325 MG per tablet Take 1 tablet by mouth Every 4 (Four) Hours As Needed for Moderate Pain. 1/4/24   Dave De Jesus Jr., MD   hydrOXYzine pamoate (VISTARIL) 25 MG capsule TAKE 1 CAPSULE BY MOUTH THREE TIMES A DAY AS NEEDED ANXIETY 8/4/22   Gideon Dawson MD   losartan-hydrochlorothiazide (HYZAAR) 50-12.5 MG per tablet Take 1 tablet by mouth Daily. 8/4/22   Gideon Dawson MD    omeprazole (priLOSEC) 40 MG capsule Take 40 mg by mouth Daily. 8/4/22   Provider, MD Gideon   predniSONE (DELTASONE) 20 MG tablet Take 1 tablet by mouth 2 (Two) Times a Day. 1/4/24   Dave De Jesus Jr., MD   traZODone (DESYREL) 100 MG tablet Take 100 mg by mouth Every Night.    Provider, MD Gideon       Medications   HYDROcodone-acetaminophen (NORCO) 7.5-325 MG per tablet 1 tablet (1 tablet Oral Given 1/4/24 2152)   predniSONE (DELTASONE) tablet 20 mg (20 mg Oral Given 1/4/24 2152)       Vitals:    01/04/24 2153   BP: 156/100   Pulse: 88   Resp: 16   Temp: 98 °F (36.7 °C)   SpO2: 96%         Objective   Physical Exam  Vitals and nursing note reviewed.   Constitutional:       Appearance: Normal appearance.   Eyes:      Extraocular Movements: Extraocular movements intact.      Pupils: Pupils are equal, round, and reactive to light.   Neck:      Comments: There is tenderness palpation of the musculature in the posterior neck.  There is no step-off or deformity noted.  He does move his neck but does so slowly because of the pain posteriorly.  Musculoskeletal:         General: Normal range of motion.      Cervical back: Normal range of motion and neck supple. Tenderness present.   Neurological:      General: No focal deficit present.      Mental Status: He is alert and oriented to person, place, and time.      Comments: He has no signs of any neurologic deficit from this.   Psychiatric:         Mood and Affect: Mood normal.         Behavior: Behavior normal.         Procedures         Lab Results (last 24 hours)       ** No results found for the last 24 hours. **            No orders to display       ED Course          MDM  Number of Diagnoses or Management Options  Neck pain: new and does not require workup  Diagnosis management comments: I told the patient that most time this is a musculoskeletal pain.  I did offer to get a CT scan of his neck tonight but he did not have any injury or trauma so I really  do not expect it to show anything.  I did offer because of his history of surgery in the past.  I told him we could also treat it with pain control and anti-inflammatories and see how it went.  A large percentage of these types of pains will resolve by themselves given simple treatments.  He decided he wants to do that and he will follow-up with his doctor if the pain gets worse and may get further studies then.  He is discharged in stable condition.    Risk of Complications, Morbidity, and/or Mortality  Presenting problems: moderate  Diagnostic procedures: minimal  Management options: moderate    Patient Progress  Patient progress: stable        Final diagnoses:   Neck pain          Dave De Jesus Jr., MD  01/04/24 9552

## 2024-02-27 ENCOUNTER — NURSE TRIAGE (OUTPATIENT)
Dept: CALL CENTER | Facility: HOSPITAL | Age: 44
End: 2024-02-27
Payer: COMMERCIAL

## 2024-02-28 NOTE — TELEPHONE ENCOUNTER
Reason for Disposition   [1] SEVERE diarrhea (e.g., 7 or more times / day more than normal) AND [2] present > 24 hours (1 day)    Additional Information   Negative: Shock suspected (e.g., cold/pale/clammy skin, too weak to stand, low BP, rapid pulse)   Negative: Difficult to awaken or acting confused (e.g., disoriented, slurred speech)   Negative: Sounds like a life-threatening emergency to the triager   Negative: Vomiting also present and worse than the diarrhea   Negative: [1] Blood in stool AND [2] without diarrhea   Negative: Diarrhea in a cancer patient who is currently (or recently) receiving chemotherapy or radiation therapy, or cancer patient who has metastatic or end-stage cancer and is receiving palliative care   Negative: Diarrhea begins while taking an antibiotic by mouth (oral antibiotic)   Negative: [1] SEVERE abdominal pain (e.g., excruciating) AND [2] present > 1 hour   Negative: [1] SEVERE abdominal pain AND [2] age > 60 years   Negative: [1] Blood in the stool AND [2] moderate or large amount of blood   Negative: Black or tarry bowel movements  (Exception: Chronic-unchanged black-grey BMs AND is taking iron pills or Pepto-Bismol.)   Negative: [1] Drinking very little AND [2] dehydration suspected (e.g., no urine > 12 hours, very dry mouth, very lightheaded)   Negative: Patient sounds very sick or weak to the triager   Negative: [1] SEVERE diarrhea (e.g., 7 or more times / day more than normal) AND [2] age > 60 years   Negative: [1] Constant abdominal pain AND [2] present > 2 hours   Negative: [1] Fever > 103 F (39.4 C) AND [2] not able to get the fever down using Fever Care Advice   Negative: [1] MODERATE diarrhea (e.g., 4-6 times / day more than normal) AND [2] present > 48 hours (2 days)   Negative: [1] MODERATE diarrhea (e.g., 4-6 times / day more than normal) AND [2] age > 70 years   Negative: Fever > 101 F (38.3 C)   Negative: Fever present > 3 days (72 hours)   Negative: Abdominal pain   "(Exception: Pain clears with each passage of diarrhea stool.)    Answer Assessment - Initial Assessment Questions  1. DIARRHEA SEVERITY: \"How bad is the diarrhea?\" \"How many more stools have you had in the past 24 hours than normal?\"     - NO DIARRHEA (SCALE 0)    - MILD (SCALE 1-3): Few loose or mushy BMs; increase of 1-3 stools over normal daily number of stools; mild increase in ostomy output.    -  MODERATE (SCALE 4-7): Increase of 4-6 stools daily over normal; moderate increase in ostomy output.    -  SEVERE (SCALE 8-10; OR \"WORST POSSIBLE\"): Increase of 7 or more stools daily over normal; moderate increase in ostomy output; incontinence.      severe  2. ONSET: \"When did the diarrhea begin?\"      Monday at noon  3. BM CONSISTENCY: \"How loose or watery is the diarrhea?\"       watery  4. VOMITING: \"Are you also vomiting?\" If Yes, ask: \"How many times in the past 24 hours?\"       I had vomiting on Monday only  5. ABDOMEN PAIN: \"Are you having any abdomen pain?\" If Yes, ask: \"What does it feel like?\" (e.g., crampy, dull, intermittent, constant)       Crampy, intermittent  6. ABDOMEN PAIN SEVERITY: If present, ask: \"How bad is the pain?\"  (e.g., Scale 1-10; mild, moderate, or severe)    - MILD (1-3): doesn't interfere with normal activities, abdomen soft and not tender to touch     - MODERATE (4-7): interferes with normal activities or awakens from sleep, abdomen tender to touch     - SEVERE (8-10): excruciating pain, doubled over, unable to do any normal activities        moderate  7. ORAL INTAKE: If vomiting, \"Have you been able to drink liquids?\" \"How much liquids have you had in the past 24 hours?\"      Very little  8. HYDRATION: \"Any signs of dehydration?\" (e.g., dry mouth [not just dry lips], too weak to stand, dizziness, new weight loss) \"When did you last urinate?\"      No signs  9. EXPOSURE: \"Have you traveled to a foreign country recently?\" \"Have you been exposed to anyone with diarrhea?\" \"Could you have " "eaten any food that was spoiled?\"      No  10. ANTIBIOTIC USE: \"Are you taking antibiotics now or have you taken antibiotics in the past 2 months?\"        No  11. OTHER SYMPTOMS: \"Do you have any other symptoms?\" (e.g., fever, blood in stool)        No  12. PREGNANCY: \"Is there any chance you are pregnant?\" \"When was your last menstrual period?\"        NA    Protocols used: Diarrhea-ADULT-AH    "

## 2024-10-29 ENCOUNTER — APPOINTMENT (OUTPATIENT)
Dept: CT IMAGING | Facility: HOSPITAL | Age: 44
End: 2024-10-29
Payer: MEDICAID

## 2024-10-29 ENCOUNTER — HOSPITAL ENCOUNTER (EMERGENCY)
Facility: HOSPITAL | Age: 44
Discharge: HOME | End: 2024-10-29
Attending: EMERGENCY MEDICINE | Admitting: EMERGENCY MEDICINE
Payer: MEDICAID

## 2024-10-29 VITALS
SYSTOLIC BLOOD PRESSURE: 150 MMHG | RESPIRATION RATE: 18 BRPM | HEIGHT: 76 IN | WEIGHT: 288 LBS | DIASTOLIC BLOOD PRESSURE: 93 MMHG | OXYGEN SATURATION: 99 % | HEART RATE: 68 BPM | TEMPERATURE: 98.4 F | BODY MASS INDEX: 35.07 KG/M2

## 2024-10-29 DIAGNOSIS — S16.1XXA ACUTE STRAIN OF NECK MUSCLE, INITIAL ENCOUNTER: Primary | ICD-10-CM

## 2024-10-29 PROCEDURE — 72125 CT NECK SPINE W/O DYE: CPT

## 2024-10-29 PROCEDURE — 99284 EMERGENCY DEPT VISIT MOD MDM: CPT

## 2024-10-29 PROCEDURE — 96372 THER/PROPH/DIAG INJ SC/IM: CPT

## 2024-10-29 PROCEDURE — 25010000002 KETOROLAC TROMETHAMINE PER 15 MG: Performed by: EMERGENCY MEDICINE

## 2024-10-29 RX ORDER — MELOXICAM 15 MG/1
15 TABLET ORAL DAILY
Qty: 12 TABLET | Refills: 0 | Status: SHIPPED | OUTPATIENT
Start: 2024-10-29

## 2024-10-29 RX ORDER — KETOROLAC TROMETHAMINE 30 MG/ML
30 INJECTION, SOLUTION INTRAMUSCULAR; INTRAVENOUS ONCE
Status: COMPLETED | OUTPATIENT
Start: 2024-10-29 | End: 2024-10-29

## 2024-10-29 RX ORDER — METHOCARBAMOL 500 MG/1
1000 TABLET, FILM COATED ORAL 4 TIMES DAILY
Status: DISCONTINUED | OUTPATIENT
Start: 2024-10-29 | End: 2024-10-30 | Stop reason: HOSPADM

## 2024-10-29 RX ORDER — METHOCARBAMOL 750 MG/1
750 TABLET, FILM COATED ORAL 3 TIMES DAILY PRN
Qty: 20 TABLET | Refills: 0 | Status: SHIPPED | OUTPATIENT
Start: 2024-10-29

## 2024-10-29 RX ADMIN — KETOROLAC TROMETHAMINE 30 MG: 30 INJECTION, SOLUTION INTRAMUSCULAR; INTRAVENOUS at 20:31

## 2024-10-29 RX ADMIN — METHOCARBAMOL 1000 MG: 500 TABLET ORAL at 20:40

## 2024-10-30 NOTE — ED PROVIDER NOTES
Subjective   History of Present Illness  Pt presents to the  with report of L neck pain.  States he had cervical fusion 1yr ago - 3-4d ago was on a side by side and lucia his neck.  Pt reports pain with turning head to left/movement of neck.  No f/c/n/v.  No recent illnesses.          Review of Systems   Constitutional:  Negative for fever.   Respiratory:  Negative for cough and shortness of breath.    Cardiovascular:  Negative for chest pain.   Musculoskeletal:  Positive for neck pain.   Skin:  Negative for rash and wound.   Neurological:  Negative for speech difficulty and weakness.   All other systems reviewed and are negative.      Past Medical History:   Diagnosis Date    GERD (gastroesophageal reflux disease)     Hypertension     Low back pain        No Known Allergies    Past Surgical History:   Procedure Laterality Date    BACK SURGERY         Family History   Problem Relation Age of Onset    Cancer Mother     Diabetes Father     Hypertension Father        Social History     Socioeconomic History    Marital status: Single   Tobacco Use    Smoking status: Every Day     Current packs/day: 1.00     Types: Cigarettes    Smokeless tobacco: Never   Vaping Use    Vaping status: Never Used   Substance and Sexual Activity    Drug use: Never    Sexual activity: Yes     Partners: Female           Objective   Physical Exam  Vitals and nursing note reviewed.   Constitutional:       Appearance: Normal appearance.   HENT:      Head: Normocephalic and atraumatic.      Mouth/Throat:      Mouth: Mucous membranes are moist.   Neck:      Comments: L neck with some spasm to trapezius region, pain with movement of neck . No midline ttp.  No swelling/mass.  No stepoff/def  Cardiovascular:      Rate and Rhythm: Normal rate and regular rhythm.      Pulses: Normal pulses.      Heart sounds: Normal heart sounds.   Pulmonary:      Effort: Pulmonary effort is normal.      Breath sounds: Normal breath sounds.   Skin:     General: Skin  is warm and dry.      Capillary Refill: Capillary refill takes less than 2 seconds.   Neurological:      General: No focal deficit present.      Mental Status: He is alert and oriented to person, place, and time.      Sensory: No sensory deficit.      Motor: No weakness.         Procedures           ED Course      CT Cervical Spine Without Contrast   Final Result   1. No evidence of cervical spine fracture.   2. Postoperative and degenerative changes of the cervical spine, as   described.           The full report of this exam was immediately signed and available to the   emergency room. The patient is currently in the emergency room.               This report was signed and finalized on 10/29/2024 10:06 PM by Dr. Andry Duong MD.                                                       Medical Decision Making  Pt stable in EC - no neuro deficits.  No fx/dislocation.  + cervical strain/torticollis.  Given toradol/robaxin and will d/c with robaxin/mobic.  Recommend outpt f/u.  Prec given    Amount and/or Complexity of Data Reviewed  Radiology: ordered.    Risk  Prescription drug management.        Final diagnoses:   Acute strain of neck muscle, initial encounter       ED Disposition  ED Disposition       ED Disposition   Discharge    Condition   Stable    Comment   --               Edie Newman, APRN  [address] [facility] [address] [phone]               Medication List        New Prescriptions      meloxicam 15 MG tablet  Commonly known as: Mobic  Take 1 tablet by mouth Daily.     methocarbamol 750 MG tablet  Commonly known as: ROBAXIN  Take 1 tablet by mouth 3 (Three) Times a Day As Needed for Muscle Spasms.               Where to Get Your Medications        These medications were sent to [id number] - [place], [place] - [address] - [phone]  - [phone] FX  [address], [facility] [address]      Phone: [phone]   meloxicam 15 MG  tablet  methocarbamol 750 MG tablet            Mikel Bryant DO  10/29/24 2003       Mikel Bryant,   10/29/24 7268

## 2024-12-01 ENCOUNTER — APPOINTMENT (OUTPATIENT)
Dept: CT IMAGING | Age: 44
End: 2024-12-01

## 2024-12-01 ENCOUNTER — HOSPITAL ENCOUNTER (EMERGENCY)
Age: 44
Discharge: HOME | End: 2024-12-01

## 2024-12-01 VITALS
RESPIRATION RATE: 18 BRPM | OXYGEN SATURATION: 98 % | DIASTOLIC BLOOD PRESSURE: 98 MMHG | HEART RATE: 90 BPM | SYSTOLIC BLOOD PRESSURE: 156 MMHG | BODY MASS INDEX: 35.31 KG/M2 | TEMPERATURE: 97 F | WEIGHT: 290 LBS | HEIGHT: 76 IN

## 2024-12-01 DIAGNOSIS — M54.31 SCIATICA OF RIGHT SIDE: Primary | ICD-10-CM

## 2024-12-01 PROCEDURE — 96372 THER/PROPH/DIAG INJ SC/IM: CPT

## 2024-12-01 PROCEDURE — 99284 EMERGENCY DEPT VISIT MOD MDM: CPT

## 2024-12-01 PROCEDURE — 72131 CT LUMBAR SPINE W/O DYE: CPT

## 2024-12-01 PROCEDURE — 6360000002 HC RX W HCPCS: Performed by: NURSE PRACTITIONER

## 2024-12-01 RX ORDER — ORPHENADRINE CITRATE 30 MG/ML
60 INJECTION INTRAMUSCULAR; INTRAVENOUS ONCE
Status: COMPLETED | OUTPATIENT
Start: 2024-12-01 | End: 2024-12-01

## 2024-12-01 RX ORDER — METHYLPREDNISOLONE 4 MG/1
TABLET ORAL
Qty: 1 KIT | Refills: 0 | Status: SHIPPED | OUTPATIENT
Start: 2024-12-01

## 2024-12-01 RX ADMIN — ORPHENADRINE CITRATE 60 MG: 60 INJECTION INTRAMUSCULAR; INTRAVENOUS at 14:41

## 2024-12-01 ASSESSMENT — PAIN DESCRIPTION - LOCATION: LOCATION: BACK

## 2024-12-01 ASSESSMENT — PAIN SCALES - GENERAL: PAINLEVEL_OUTOF10: 9

## 2024-12-01 NOTE — ED PROVIDER NOTES
[facility] EMERGENCY DEPT  EMERGENCY DEPARTMENT ENCOUNTER      Pt Name: Hugh Gr  MRN: [id number]  Birthdate 1980  Date of evaluation: 12/1/2024  Provider: CHARI Allen NP    CHIEF COMPLAINT       Chief Complaint   Patient presents with    Back Pain     Was picking up a mattress and felt a pop in the lower back and fell to knees. Right hip and lower back pain           HISTORY OF PRESENT ILLNESS   (Location/Symptom, Timing/Onset,Context/Setting, Quality, Duration, Modifying Factors, Severity)  Note limiting factors.   Hugh Gr is a 44 y.o. male who presents to the emergency department with complaint of low back pain that radiates into his right hip.  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  He is currently in pain management and takes Percocet 10 mg tablets without relief.  He denies any loss of bowel or bladder and denies saddle anesthesia.      The history is provided by the patient.       NursingNotes were reviewed.    REVIEW OF SYSTEMS    (2-9 systems for level 4, 10 or more for level 5)     Review of Systems   Constitutional:         As per HPI   Respiratory: Negative.     Cardiovascular: Negative.    Musculoskeletal:  Positive for back pain and myalgias.   Neurological: Negative.  Negative for numbness.   All other systems reviewed and are negative.      A complete review of systems was performed and is negative except as noted above in the HPI.       PAST MEDICAL HISTORY     Past Medical History:   Diagnosis Date    Hypertension          SURGICAL HISTORY       Past Surgical History:   Procedure Laterality Date    BACK SURGERY      LUMBAR FUSION    CERVICAL FUSION N/A 5/24/2023    ACDF C4-5, C5-6 performed by Lisandro Genao DO at [facility] OR         CURRENT MEDICATIONS       Previous Medications    ALBUTEROL SULFATE HFA (PROVENTIL;VENTOLIN;PROAIR) 108 (90 BASE) MCG/ACT INHALER    Inhale 2 puffs into the lungs every 4 hours as

## 2024-12-01 NOTE — DISCHARGE INSTRUCTIONS
Continue your pain medication as prescribed.  Alternate with muscle relaxer.  Complete the steroid Dosepak as prescribed.  Follow-up with pain management or your doctor if symptoms persist.  Ice packs to your low back.  Rest for the next few days.

## 2024-12-15 ENCOUNTER — HOSPITAL ENCOUNTER (EMERGENCY)
Facility: HOSPITAL | Age: 44
Discharge: HOME | End: 2024-12-15
Attending: STUDENT IN AN ORGANIZED HEALTH CARE EDUCATION/TRAINING PROGRAM | Admitting: STUDENT IN AN ORGANIZED HEALTH CARE EDUCATION/TRAINING PROGRAM
Payer: COMMERCIAL

## 2024-12-15 VITALS
HEART RATE: 71 BPM | HEIGHT: 73 IN | OXYGEN SATURATION: 97 % | SYSTOLIC BLOOD PRESSURE: 133 MMHG | RESPIRATION RATE: 18 BRPM | DIASTOLIC BLOOD PRESSURE: 93 MMHG | TEMPERATURE: 97.9 F | WEIGHT: 300 LBS | BODY MASS INDEX: 39.76 KG/M2

## 2024-12-15 DIAGNOSIS — M79.661 RIGHT CALF PAIN: Primary | ICD-10-CM

## 2024-12-15 PROCEDURE — 25010000002 KETOROLAC TROMETHAMINE PER 15 MG: Performed by: STUDENT IN AN ORGANIZED HEALTH CARE EDUCATION/TRAINING PROGRAM

## 2024-12-15 PROCEDURE — 99283 EMERGENCY DEPT VISIT LOW MDM: CPT

## 2024-12-15 PROCEDURE — 25010000002 MORPHINE PER 10 MG: Performed by: STUDENT IN AN ORGANIZED HEALTH CARE EDUCATION/TRAINING PROGRAM

## 2024-12-15 PROCEDURE — 96372 THER/PROPH/DIAG INJ SC/IM: CPT

## 2024-12-15 RX ORDER — KETOROLAC TROMETHAMINE 30 MG/ML
30 INJECTION, SOLUTION INTRAMUSCULAR; INTRAVENOUS ONCE
Status: COMPLETED | OUTPATIENT
Start: 2024-12-15 | End: 2024-12-15

## 2024-12-15 RX ORDER — LIDOCAINE 4 G/G
1 PATCH TOPICAL ONCE
Status: DISCONTINUED | OUTPATIENT
Start: 2024-12-15 | End: 2024-12-15 | Stop reason: HOSPADM

## 2024-12-15 RX ORDER — NAPROXEN 500 MG/1
500 TABLET ORAL 2 TIMES DAILY PRN
Qty: 10 TABLET | Refills: 0 | Status: SHIPPED | OUTPATIENT
Start: 2024-12-15

## 2024-12-15 RX ORDER — OXYCODONE AND ACETAMINOPHEN 5; 325 MG/1; MG/1
1 TABLET ORAL EVERY 8 HOURS PRN
Qty: 12 TABLET | Refills: 0 | Status: SHIPPED | OUTPATIENT
Start: 2024-12-15

## 2024-12-15 RX ADMIN — LIDOCAINE 1 PATCH: 0.04 PATCH TOPICAL at 02:31

## 2024-12-15 RX ADMIN — KETOROLAC TROMETHAMINE 30 MG: 30 INJECTION, SOLUTION INTRAMUSCULAR; INTRAVENOUS at 02:35

## 2024-12-15 RX ADMIN — MORPHINE SULFATE 4 MG: 4 INJECTION, SOLUTION INTRAMUSCULAR; INTRAVENOUS at 02:34

## 2024-12-15 NOTE — DISCHARGE INSTRUCTIONS
As discussed there is concerned for calf sprained. Your distal pulses and sensation normal. Base on physical exam no concerns for achilles tendon injury. Please use heating pads, and take medication as prescribed. Return to the emergency room with worsening pain, swelling, skin color changes, numbness or weakness. Follow up with your doctor for reassessment.

## 2024-12-15 NOTE — ED PROVIDER NOTES
Subjective   History of Present Illness  44 y.o M with no significant chronic medical hx, in the emergency room for concerns of R calf pain, after he had a miss step and head a pop follow by pain. On exam distal pulses intact of the foot. No skin color changes        Review of Systems   All other systems reviewed and are negative.      Past Medical History:   Diagnosis Date    GERD (gastroesophageal reflux disease)     Hypertension     Low back pain        No Known Allergies    Past Surgical History:   Procedure Laterality Date    BACK SURGERY         Family History   Problem Relation Age of Onset    Cancer Mother     Diabetes Father     Hypertension Father        Social History     Socioeconomic History    Marital status: Single   Tobacco Use    Smoking status: Every Day     Current packs/day: 1.00     Types: Cigarettes    Smokeless tobacco: Never   Vaping Use    Vaping status: Never Used   Substance and Sexual Activity    Drug use: Never    Sexual activity: Yes     Partners: Female           Objective   Physical Exam  Constitutional:       General: He is not in acute distress.     Appearance: Normal appearance.   HENT:      Head: Normocephalic.   Eyes:      Pupils: Pupils are equal, round, and reactive to light.   Cardiovascular:      Rate and Rhythm: Normal rate and regular rhythm.      Pulses: Normal pulses.   Pulmonary:      Effort: Pulmonary effort is normal.      Breath sounds: Normal breath sounds.   Abdominal:      General: Bowel sounds are normal. There is no distension.      Palpations: Abdomen is soft.      Tenderness: There is no abdominal tenderness. There is no guarding.   Musculoskeletal:      Cervical back: Normal range of motion. No rigidity.      Comments: PT has tenderness over the R calf. No skin color changes, swelling or deformities. Neurovascular intact. Paige reassuring.    Lymphadenopathy:      Cervical: No cervical adenopathy.   Skin:     General: Skin is warm.      Capillary Refill:  Capillary refill takes less than 2 seconds.   Neurological:      General: No focal deficit present.      Mental Status: He is alert and oriented to person, place, and time. Mental status is at baseline.      Cranial Nerves: No cranial nerve deficit.      Motor: No weakness.         Procedures           ED Course                                                       Medical Decision Making  44 y.o M with no significant chronic medical hx, in the emergency room for concerns of R calf pain, after he had a miss step and head a pop follow by pain. On exam distal pulses intact of the foot. No skin color changes. No calf swelling of the R when compare to the L. Neurovascular intact, no deformities. Paige test reassuring, no concerns for achilles tendon rupture. PT is able to bear weight. Concerns for calf sprained. PT had pain acute. He does not have progressive symptoms. No concerns for a DVT. No risk factors for DVT, it was an acute episode with inciting event of miss step.  Pain control ordered, will reassess PT    3:31am pt no longer having pain, he feels much improved. Remains neurovascular intact. Negative for expanding swelling or concerns for expanding hematoma. PT feels good about being discharge. Will give strict return precautions. PMD follow up for reassessment encouraged, as well as supportive care including heating pads. Will give pain medication. He was given counseling of elevated BP in the ED, as well as to do BP diary at home.     Problems Addressed:  Right calf pain: complicated acute illness or injury    Risk  OTC drugs.  Prescription drug management.        Final diagnoses:   Right calf pain       ED Disposition  ED Disposition       ED Disposition   Discharge    Condition   Stable    Comment   --               Edie Newman, APRN  [address] [facility] [address] [phone]    In 3 days           Medication List        New Prescriptions      naloxone 4 MG/0.1ML nasal spray  Commonly  known as: NARCAN  Call 911. Don't prime. [place] in 1 nostril for overdose. Repeat in 2-3 minutes in other nostril if no or minimal breathing/responsiveness.     naproxen 500 MG EC tablet  Commonly known as: EC NAPROSYN  Take 1 tablet by mouth 2 (Two) Times a Day As Needed for Mild Pain.     oxyCODONE-acetaminophen 5-325 MG per tablet  Commonly known as: PERCOCET  Take 1 tablet by mouth Every 8 (Eight) Hours As Needed for Severe Pain.               Where to Get Your Medications        These medications were sent to [facility] #[id number] - [place], [place] - [address] - [phone]  - [phone] FX  [address] [facility] [address]      Phone: [phone]   naloxone 4 MG/0.1ML nasal spray  naproxen 500 MG EC tablet  oxyCODONE-acetaminophen 5-325 MG per tablet            Saad Curry MD  12/16/24 8715

## 2025-07-07 ENCOUNTER — APPOINTMENT (OUTPATIENT)
Dept: CT IMAGING | Facility: HOSPITAL | Age: 45
End: 2025-07-07
Payer: MEDICAID

## 2025-07-07 ENCOUNTER — HOSPITAL ENCOUNTER (EMERGENCY)
Facility: HOSPITAL | Age: 45
Discharge: HOME | End: 2025-07-07
Attending: EMERGENCY MEDICINE | Admitting: EMERGENCY MEDICINE
Payer: MEDICAID

## 2025-07-07 VITALS
SYSTOLIC BLOOD PRESSURE: 152 MMHG | OXYGEN SATURATION: 98 % | BODY MASS INDEX: 35.31 KG/M2 | DIASTOLIC BLOOD PRESSURE: 103 MMHG | TEMPERATURE: 98.1 F | WEIGHT: 290 LBS | HEIGHT: 76 IN | HEART RATE: 80 BPM | RESPIRATION RATE: 17 BRPM

## 2025-07-07 DIAGNOSIS — S16.1XXA ACUTE STRAIN OF NECK MUSCLE, INITIAL ENCOUNTER: Primary | ICD-10-CM

## 2025-07-07 DIAGNOSIS — M50.30 DEGENERATIVE DISC DISEASE, CERVICAL: ICD-10-CM

## 2025-07-07 PROCEDURE — 99284 EMERGENCY DEPT VISIT MOD MDM: CPT | Performed by: EMERGENCY MEDICINE

## 2025-07-07 PROCEDURE — 25010000002 KETOROLAC TROMETHAMINE PER 15 MG: Performed by: EMERGENCY MEDICINE

## 2025-07-07 PROCEDURE — 72125 CT NECK SPINE W/O DYE: CPT

## 2025-07-07 PROCEDURE — 96372 THER/PROPH/DIAG INJ SC/IM: CPT

## 2025-07-07 RX ORDER — METHYLPREDNISOLONE 4 MG/1
TABLET ORAL
Qty: 1 EACH | Refills: 0 | Status: SHIPPED | OUTPATIENT
Start: 2025-07-07

## 2025-07-07 RX ORDER — KETOROLAC TROMETHAMINE 30 MG/ML
30 INJECTION, SOLUTION INTRAMUSCULAR; INTRAVENOUS ONCE
Status: COMPLETED | OUTPATIENT
Start: 2025-07-07 | End: 2025-07-07

## 2025-07-07 RX ORDER — CYCLOBENZAPRINE HCL 10 MG
10 TABLET ORAL ONCE
Status: COMPLETED | OUTPATIENT
Start: 2025-07-07 | End: 2025-07-07

## 2025-07-07 RX ORDER — CYCLOBENZAPRINE HCL 10 MG
10 TABLET ORAL 3 TIMES DAILY PRN
Qty: 20 TABLET | Refills: 0 | Status: SHIPPED | OUTPATIENT
Start: 2025-07-07

## 2025-07-07 RX ADMIN — CYCLOBENZAPRINE HYDROCHLORIDE 10 MG: 10 TABLET, FILM COATED ORAL at 20:53

## 2025-07-07 RX ADMIN — KETOROLAC TROMETHAMINE 30 MG: 30 INJECTION, SOLUTION INTRAMUSCULAR; INTRAVENOUS at 20:53

## 2025-07-07 NOTE — Clinical Note
[facility] EMERGENCY DEPARTMENT  [address]  [facility] [address]  Phone: [phone]    Miguel Willis was seen and treated in our emergency department on 7/7/2025.  He may return to work on 07/09/2025.         Thank you for choosing [facility].    Everton Silverman MD

## 2025-07-08 NOTE — ED PROVIDER NOTES
Subjective   History of Present Illness  45-year-old male presents to the ED with complaint of left-sided neck pain.  He has a history of degenerative disc disease, status post cervical spine fusion.  Surgery was performed years ago.    Patient reports onset of left side neck pain that began 2 days ago.  He denies any trauma.  Pain began spontaneously.  States pain radiates to his jaw and around to the front of his neck.  No radiation down his arm.  No chest pain or shortness of breath, no nausea, vomiting, diaphoresis.  Pain is worse palpation, worse with range of motion.  Moderate severity.  He denies gait instability, numbness or weakness of his arms or legs, saddle anesthesia, bowel or bladder dysfunction.  He denies IV drug use.    History provided by:  Patient      Review of Systems   All other systems reviewed and are negative.      Past Medical History:   Diagnosis Date    GERD (gastroesophageal reflux disease)     Hypertension     Low back pain        No Known Allergies    Past Surgical History:   Procedure Laterality Date    BACK SURGERY         Family History   Problem Relation Age of Onset    Cancer Mother     Diabetes Father     Hypertension Father        Social History     Socioeconomic History    Marital status: Single   Tobacco Use    Smoking status: Every Day     Current packs/day: 1.00     Types: Cigarettes    Smokeless tobacco: Never   Vaping Use    Vaping status: Never Used   Substance and Sexual Activity    Drug use: Never    Sexual activity: Yes     Partners: Female           Objective   Physical Exam  Vitals and nursing note reviewed.   Constitutional:       Appearance: Normal appearance. He is normal weight.   HENT:      Head: Normocephalic and atraumatic.   Cardiovascular:      Rate and Rhythm: Normal rate and regular rhythm.      Heart sounds: Normal heart sounds. No murmur heard.  Pulmonary:      Effort: Pulmonary effort is normal.      Breath sounds: Normal breath sounds. No wheezing,  rhonchi or rales.   Musculoskeletal:      Right lower leg: No edema.      Left lower leg: No edema.   Skin:     General: Skin is warm and dry.      Capillary Refill: Capillary refill takes less than 2 seconds.   Neurological:      General: No focal deficit present.      Mental Status: He is alert and oriented to person, place, and time. Mental status is at baseline.         Procedures         Lab Results (last 24 hours)       ** No results found for the last 24 hours. **         CT Cervical Spine Without Contrast  Result Date: 7/7/2025  EXAM: CT CERVICAL SPINE WO CONTRAST-   HISTORY: neck pain, hx of spinal fusion   COMPARISON: 10/29/2024.  DOSE LENGTH PRODUCT: 544.59 mGy.cm  Automated exposure control was also utilized to decrease patient radiation dose.  TECHNIQUE: Unenhanced CT images of the cervical spine were obtained with multiplanar reformats.  FINDINGS:  The patient is status post anterior and interbody fusion C4-C6 which is unchanged. There is incomplete osseous fusion at these levels. Facet joints are aligned bilaterally. There is mild facet arthropathy and no spondylolisthesis. Prevertebral soft tissues are normal.  The visualized soft tissues of the neck are unremarkable.  Visualized lung apices are clear.        1. Stable exam with anterior and interbody fusion C4-C6 with incomplete osseous fusion at these levels. 2. Multilevel right greater than left facet arthropathy and no spondylolisthesis.  This report was signed and finalized on 7/7/2025 9:09 PM by Willam Jain.       ED Course  ED Course as of 07/07/25 2225 Mon Jul 07, 2025 2224 45-year-old male presents to the ED with complaint of neck pain.  Likely musculoskeletal etiology.  Does have history of spine surgery.  We obtained CT C-spine, shows C4-C6 ACDF, and complete bony fusion.  This is unchanged from previous CT scans.  No mass or tumor.  No physical exam findings concerning for myelopathy.  Will give trial of muscle relaxants, have  the patient follow-up with his spine surgeon as an outpatient. [AW]      ED Course User Index  [AW] Everton Silverman MD                                                       Medical Decision Making  Amount and/or Complexity of Data Reviewed  Radiology: ordered.    Risk  Prescription drug management.        Final diagnoses:   Acute strain of neck muscle, initial encounter   Degenerative disc disease, cervical       ED Disposition  ED Disposition       ED Disposition   Discharge    Condition   Stable    Comment   --               Edie Newman, APRN  [address] [facility] [address] [phone]    Schedule an appointment as soon as possible for a visit   As needed         Medication List        New Prescriptions      cyclobenzaprine 10 MG tablet  Commonly known as: FLEXERIL  Take 1 tablet by mouth 3 (Three) Times a Day As Needed for Muscle Spasms.               Where to Get Your Medications        [facility] [place] [place] - [address] [place] [address] - [phone]  - [phone] [address] [address]      Phone: [phone]   cyclobenzaprine 10 MG tablet  methylPREDNISolone 4 MG dose pack            Everton Silverman MD  07/07/25 5238

## 2025-07-08 NOTE — ED NOTES
This nurse went to room to discharge pt and review discharge papers. Pt and family member not in room. Security states video showing pt left @ 2207 to the exit door

## 2025-07-31 ENCOUNTER — HOSPITAL ENCOUNTER (EMERGENCY)
Age: 45
Discharge: HOME | End: 2025-07-31
Attending: EMERGENCY MEDICINE

## 2025-07-31 ENCOUNTER — APPOINTMENT (OUTPATIENT)
Dept: CT IMAGING | Age: 45
End: 2025-07-31

## 2025-07-31 VITALS
TEMPERATURE: 98.1 F | HEART RATE: 65 BPM | RESPIRATION RATE: 19 BRPM | HEIGHT: 76 IN | SYSTOLIC BLOOD PRESSURE: 162 MMHG | DIASTOLIC BLOOD PRESSURE: 112 MMHG | OXYGEN SATURATION: 99 % | WEIGHT: 290 LBS | BODY MASS INDEX: 35.31 KG/M2

## 2025-07-31 DIAGNOSIS — S16.1XXA ACUTE STRAIN OF NECK MUSCLE, INITIAL ENCOUNTER: Primary | ICD-10-CM

## 2025-07-31 PROCEDURE — 99284 EMERGENCY DEPT VISIT MOD MDM: CPT

## 2025-07-31 PROCEDURE — 96372 THER/PROPH/DIAG INJ SC/IM: CPT

## 2025-07-31 PROCEDURE — 72125 CT NECK SPINE W/O DYE: CPT

## 2025-07-31 PROCEDURE — 6360000002 HC RX W HCPCS: Performed by: EMERGENCY MEDICINE

## 2025-07-31 PROCEDURE — 70450 CT HEAD/BRAIN W/O DYE: CPT

## 2025-07-31 RX ORDER — HYDROMORPHONE HYDROCHLORIDE 1 MG/ML
1 INJECTION, SOLUTION INTRAMUSCULAR; INTRAVENOUS; SUBCUTANEOUS ONCE
Refills: 0 | Status: COMPLETED | OUTPATIENT
Start: 2025-07-31 | End: 2025-07-31

## 2025-07-31 RX ORDER — ORPHENADRINE CITRATE 30 MG/ML
60 INJECTION INTRAMUSCULAR; INTRAVENOUS ONCE
Status: COMPLETED | OUTPATIENT
Start: 2025-07-31 | End: 2025-07-31

## 2025-07-31 RX ADMIN — HYDROMORPHONE HYDROCHLORIDE 1 MG: 1 INJECTION, SOLUTION INTRAMUSCULAR; INTRAVENOUS; SUBCUTANEOUS at 19:35

## 2025-07-31 RX ADMIN — ORPHENADRINE CITRATE 60 MG: 60 INJECTION INTRAMUSCULAR; INTRAVENOUS at 19:35

## 2025-07-31 ASSESSMENT — PAIN DESCRIPTION - LOCATION
LOCATION: NECK
LOCATION: HEAD;NECK

## 2025-07-31 ASSESSMENT — PAIN SCALES - GENERAL
PAINLEVEL_OUTOF10: 9
PAINLEVEL_OUTOF10: 5
PAINLEVEL_OUTOF10: 9

## 2025-07-31 ASSESSMENT — PAIN - FUNCTIONAL ASSESSMENT
PAIN_FUNCTIONAL_ASSESSMENT: 0-10
PAIN_FUNCTIONAL_ASSESSMENT: 0-10

## 2025-07-31 ASSESSMENT — PAIN DESCRIPTION - DESCRIPTORS: DESCRIPTORS: SHARP

## 2025-08-01 NOTE — ED PROVIDER NOTES
[facility] EMERGENCY DEPARTMENT  eMERGENCY dEPARTMENT eNCOUnter      Pt Name: Hugh Gr  MRN: [id number]  Birthdate 1980  Date of evaluation: 7/31/2025  Provider: Rhett Padilla MD    CHIEF COMPLAINT       Chief Complaint   Patient presents with    Neck Pain     Hx of surgery, fusion unsure of what levels. Went to [place] Sunday and has pain after roller coasters          HISTORY OF PRESENT ILLNESS   (Location/Symptom, Timing/Onset,Context/Setting, Quality, Duration, Modifying Factors, Severity)  Note limiting factors.   Hugh Gr is a 45 y.o. male who presents to the emergency department for evaluation regarding neck pain.  Patient states that he went to the [place] with his family several days ago and rode some roller coasters.  States that he had increased pain in his neck over the last couple of days.  He has not had any upper extremity numbness or tingling.  The pain is described as fairly sharp in nature and without radiation.  States he also has been having a moderate severity headache that seems to start in the lower portion near the back and radiate up over the top of his head.  He is underwent a prior neck surgery with previous fusion of the cervical spine.    HPI    NursingNotes were reviewed.    REVIEW OF SYSTEMS    (2-9 systems for level 4, 10 or more for level 5)     Review of Systems   Constitutional:  Negative for chills and fever.   Respiratory:  Negative for cough and shortness of breath.    Cardiovascular:  Negative for chest pain and palpitations.   Gastrointestinal:  Negative for abdominal pain, diarrhea, nausea and vomiting.   Musculoskeletal:  Positive for neck pain.   Neurological:  Positive for headaches. Negative for syncope and numbness.   All other systems reviewed and are negative.           PAST MEDICALHISTORY     Past Medical History:   Diagnosis Date    Hypertension          SURGICAL HISTORY       Past Surgical History:   Procedure Laterality Date     TRAZODONE (DESYREL) 150 MG TABLET    Take 1 tablet by mouth nightly       ALLERGIES     Patient has no known allergies.    FAMILY HISTORY       Family History   Problem Relation Age of Onset   • No Known Problems Mother    • High Blood Pressure Father    • Diabetes Father           SOCIAL HISTORY       Social History     Socioeconomic History   • Marital status:      Spouse name: None   • Number of children: None   • Years of education: None   • Highest education level: None   Tobacco Use   • Smoking status: Every Day     Current packs/day: 1.00     Average packs/day: 1 pack/day for 32.5 years (32.5 ttl pk-yrs)     Types: Cigarettes     Start date: 2/9/1993   • Smokeless tobacco: Current     Types: Snuff   • Tobacco comments:     DOWN TO 1/2 PPD  NOW AND DIPS OCC   Vaping Use   • Vaping status: Never Used   Substance and Sexual Activity   • Alcohol use: Never   • Drug use: Never     Social Drivers of Health     Financial Resource Strain: Low Risk  (5/23/2022)    Overall Financial Resource Strain (CARDIA)    • Difficulty of Paying Living Expenses: Not hard at all   Food Insecurity: No Food Insecurity (5/23/2022)    Hunger Vital Sign    • Worried About Running Out of Food in the Last Year: Never true    • Ran Out of Food in the Last Year: Never true    [facility]    Family and Community Support   Intimate Partner Violence: Not At Risk (7/7/2025)    Received from [facility]    Abuse Screen    • Feels Unsafe at Home or Work/School: no    • Feels Threatened by Someone: no    • Does Anyone Try to Keep You From Having Contact with Others or Doing Things Outside Your Home?: no    • Physical Signs of Abuse Present: no    [facility], [facility]    Housing Stability       SCREENINGS    [place] Coma Scale  Eye Opening: Spontaneous  Best Verbal Response: Oriented  Best Motor Response: Obeys commands  Didi

## 2025-08-07 ASSESSMENT — ENCOUNTER SYMPTOMS
VOMITING: 0
DIARRHEA: 0
NAUSEA: 0
COUGH: 0
SHORTNESS OF BREATH: 0
ABDOMINAL PAIN: 0

## (undated) DEVICE — DEVICE TRAC UNIV AD W O CRD PD DISPOSABLE NK HD HALT DLX

## (undated) DEVICE — SUTURE PERMAHAND SZ 2-0 L30IN NONABSORBABLE BLK SILK W/O A305H

## (undated) DEVICE — MICRO KOVER: Brand: UNBRANDED

## (undated) DEVICE — TUBE ET 8MM NSL ORAL BASIC CUF INTMED MURPHY EYE RADPQ MRK

## (undated) DEVICE — 3M™ STERI-STRIP™ REINFORCED ADHESIVE SKIN CLOSURES, R1547, 1/2 IN X 4 IN (12 MM X 100 MM), 6 STRIPS/ENVELOPE: Brand: 3M™ STERI-STRIP™

## (undated) DEVICE — E-Z CLEAN, NON-STICK, PTFE COATED, ELECTROSURGICAL BLADE ELECTRODE, MODIFIED EXTENDED INSULATION, 2.5 INCH (6.35 CM): Brand: MEGADYNE

## (undated) DEVICE — UNDERGLOVE SURG SZ 8 FNGR THK0.21MIL GRN LTX BEAD CUF

## (undated) DEVICE — PIN 3030003 ZEVO PLATE HOLDING PIN

## (undated) DEVICE — GLIDESCOPE SPECTRUM LARYNG BLADE LOPRO 4: Brand: MEDLINE

## (undated) DEVICE — BLANKET WRM W40.2XL55.9IN IORT LO BODY + MISTRAL AIR

## (undated) DEVICE — DRILL BIT 7080510 11 MM DRILL BIT S

## (undated) DEVICE — BAND BAG 36" X 36": Brand: TIDI

## (undated) DEVICE — TOWEL,OR,DSP,ST,BLUE,DLX,4/PK,20PK/CS: Brand: MEDLINE

## (undated) DEVICE — PEN: MARKING STD 100/CS: Brand: MEDICAL ACTION INDUSTRIES

## (undated) DEVICE — SHEET,T,THYROID,STERILE: Brand: MEDLINE

## (undated) DEVICE — MASTISOL ADHESIVE LIQ 2/3ML

## (undated) DEVICE — BIPOLAR IRRIGATOR INTEGRATED TUBING AND BIPOLAR CORD SET, DISPOSABLE

## (undated) DEVICE — COLLAR CERV UNIV AD L13-19IN TRACH OPN TWO PC RIG POLYETH

## (undated) DEVICE — SUTURE VCRL SZ 3-0 L18IN ABSRB UD L26MM SH 1/2 CIR J864D

## (undated) DEVICE — TOTAL TRAY, 16FR 10ML SIL FOLEY, URN: Brand: MEDLINE

## (undated) DEVICE — FORCEP BPLR IRIS

## (undated) DEVICE — TOOL MR8-T12MH25L MR8 12CM TL MH L 2.5MM: Brand: MIDAS REX MR8

## (undated) DEVICE — SPONGE SURG WHT KTNR DISECT RADPQ ST

## (undated) DEVICE — NEURO CDS

## (undated) DEVICE — GLOVE SURG SZ 75 CRM LTX FREE POLYISOPRENE POLYMER BEAD ANTI

## (undated) DEVICE — GOWN,PREVENTION PLUS,L,ST,24/CS: Brand: MEDLINE

## (undated) DEVICE — ELECTROSURGICAL PENCIL BUTTON SWITCH NON COATED BLADE ELECTRODE 10 FT (3 M) CORD HOLSTER: Brand: MEGADYNE